# Patient Record
Sex: FEMALE | Race: WHITE | NOT HISPANIC OR LATINO | Employment: FULL TIME | URBAN - METROPOLITAN AREA
[De-identification: names, ages, dates, MRNs, and addresses within clinical notes are randomized per-mention and may not be internally consistent; named-entity substitution may affect disease eponyms.]

---

## 2017-03-30 ENCOUNTER — ALLSCRIPTS OFFICE VISIT (OUTPATIENT)
Dept: OTHER | Facility: OTHER | Age: 37
End: 2017-03-30

## 2017-07-26 ENCOUNTER — ALLSCRIPTS OFFICE VISIT (OUTPATIENT)
Dept: OTHER | Facility: OTHER | Age: 37
End: 2017-07-26

## 2017-07-26 DIAGNOSIS — E01.0 IODINE-DEFICIENCY RELATED DIFFUSE GOITER: ICD-10-CM

## 2017-07-27 ENCOUNTER — ALLSCRIPTS OFFICE VISIT (OUTPATIENT)
Dept: OTHER | Facility: OTHER | Age: 37
End: 2017-07-27

## 2017-07-27 LAB — HCG, QUALITATIVE (HISTORICAL): NEGATIVE

## 2017-07-28 ENCOUNTER — TRANSCRIBE ORDERS (OUTPATIENT)
Dept: LAB | Facility: HOSPITAL | Age: 37
End: 2017-07-28

## 2017-07-28 ENCOUNTER — HOSPITAL ENCOUNTER (OUTPATIENT)
Dept: RADIOLOGY | Facility: HOSPITAL | Age: 37
Discharge: HOME | End: 2017-07-28
Payer: COMMERCIAL

## 2017-07-28 ENCOUNTER — APPOINTMENT (OUTPATIENT)
Dept: LAB | Facility: HOSPITAL | Age: 37
End: 2017-07-28
Payer: COMMERCIAL

## 2017-07-28 DIAGNOSIS — E01.0 IODINE-DEFICIENCY RELATED DIFFUSE GOITER: ICD-10-CM

## 2017-07-28 LAB
ALBUMIN SERPL BCP-MCNC: 4 G/DL (ref 3.5–5)
ALP SERPL-CCNC: 95 U/L (ref 46–116)
ALT SERPL W P-5'-P-CCNC: 53 U/L (ref 12–78)
ANION GAP SERPL CALCULATED.3IONS-SCNC: 9 MMOL/L (ref 4–13)
AST SERPL W P-5'-P-CCNC: 26 U/L (ref 5–45)
BILIRUB SERPL-MCNC: 0.38 MG/DL (ref 0.2–1)
BUN SERPL-MCNC: 12 MG/DL (ref 5–25)
CALCIUM SERPL-MCNC: 8.5 MG/DL (ref 8.3–10.1)
CHLORIDE SERPL-SCNC: 106 MMOL/L (ref 100–108)
CO2 SERPL-SCNC: 24 MMOL/L (ref 21–32)
CREAT SERPL-MCNC: 0.59 MG/DL (ref 0.6–1.3)
GFR SERPL CREATININE-BSD FRML MDRD: 116 ML/MIN/1.73SQ M
GLUCOSE P FAST SERPL-MCNC: 86 MG/DL (ref 65–99)
POTASSIUM SERPL-SCNC: 3.9 MMOL/L (ref 3.5–5.3)
PROT SERPL-MCNC: 7.9 G/DL (ref 6.4–8.2)
SODIUM SERPL-SCNC: 139 MMOL/L (ref 136–145)
T3 SERPL-MCNC: 1.3 NG/ML (ref 0.6–1.8)
T4 FREE SERPL-MCNC: 1.02 NG/DL (ref 0.76–1.46)
TSH SERPL DL<=0.05 MIU/L-ACNC: 1.99 UIU/ML (ref 0.36–3.74)

## 2017-07-28 PROCEDURE — 80053 COMPREHEN METABOLIC PANEL: CPT

## 2017-07-28 PROCEDURE — 36415 COLL VENOUS BLD VENIPUNCTURE: CPT

## 2017-07-28 PROCEDURE — 84443 ASSAY THYROID STIM HORMONE: CPT

## 2017-07-28 PROCEDURE — 76536 US EXAM OF HEAD AND NECK: CPT

## 2017-07-28 PROCEDURE — 84480 ASSAY TRIIODOTHYRONINE (T3): CPT

## 2017-07-28 PROCEDURE — 84439 ASSAY OF FREE THYROXINE: CPT

## 2018-01-13 VITALS
TEMPERATURE: 98.5 F | WEIGHT: 214.73 LBS | HEIGHT: 66 IN | BODY MASS INDEX: 34.51 KG/M2 | DIASTOLIC BLOOD PRESSURE: 78 MMHG | HEART RATE: 70 BPM | SYSTOLIC BLOOD PRESSURE: 120 MMHG

## 2018-01-13 VITALS
HEART RATE: 67 BPM | DIASTOLIC BLOOD PRESSURE: 80 MMHG | SYSTOLIC BLOOD PRESSURE: 121 MMHG | HEIGHT: 67 IN | WEIGHT: 216 LBS | BODY MASS INDEX: 33.9 KG/M2

## 2018-01-14 VITALS
HEIGHT: 65 IN | SYSTOLIC BLOOD PRESSURE: 136 MMHG | WEIGHT: 214 LBS | DIASTOLIC BLOOD PRESSURE: 71 MMHG | BODY MASS INDEX: 35.65 KG/M2

## 2018-08-23 RX ORDER — MULTIVITAMIN/IRON/FOLIC ACID 18MG-0.4MG
TABLET ORAL
COMMUNITY

## 2018-08-23 RX ORDER — CRANBERRY FRUIT EXTRACT 200 MG
CAPSULE ORAL
COMMUNITY

## 2018-08-23 RX ORDER — NICOTINE 21 MG/24HR
PATCH, TRANSDERMAL 24 HOURS TRANSDERMAL DAILY
COMMUNITY
Start: 2016-09-26 | End: 2020-04-24

## 2018-08-27 PROBLEM — E04.9 ENLARGED THYROID: Status: ACTIVE | Noted: 2017-07-26

## 2018-08-27 PROBLEM — F41.9 ANXIOUSNESS: Status: ACTIVE | Noted: 2017-07-26

## 2018-08-28 ENCOUNTER — OFFICE VISIT (OUTPATIENT)
Dept: INTERNAL MEDICINE CLINIC | Facility: CLINIC | Age: 38
End: 2018-08-28
Payer: COMMERCIAL

## 2018-08-28 VITALS
DIASTOLIC BLOOD PRESSURE: 80 MMHG | BODY MASS INDEX: 33.8 KG/M2 | WEIGHT: 210.32 LBS | SYSTOLIC BLOOD PRESSURE: 126 MMHG | HEIGHT: 66 IN | TEMPERATURE: 98.5 F | HEART RATE: 80 BPM

## 2018-08-28 DIAGNOSIS — F41.9 ANXIETY: ICD-10-CM

## 2018-08-28 DIAGNOSIS — E78.5 BORDERLINE HYPERLIPIDEMIA: ICD-10-CM

## 2018-08-28 DIAGNOSIS — M77.11 RIGHT LATERAL EPICONDYLITIS: Primary | ICD-10-CM

## 2018-08-28 PROCEDURE — 3008F BODY MASS INDEX DOCD: CPT | Performed by: PHYSICIAN ASSISTANT

## 2018-08-28 PROCEDURE — 99213 OFFICE O/P EST LOW 20 MIN: CPT | Performed by: PHYSICIAN ASSISTANT

## 2018-08-28 RX ORDER — HYDROXYZINE HYDROCHLORIDE 25 MG/1
25 TABLET, FILM COATED ORAL 2 TIMES DAILY
Qty: 60 TABLET | Refills: 0 | Status: SHIPPED | OUTPATIENT
Start: 2018-08-28 | End: 2020-04-24

## 2018-08-28 RX ORDER — NAPROXEN 500 MG/1
500 TABLET ORAL 2 TIMES DAILY WITH MEALS
Qty: 60 TABLET | Refills: 0 | Status: SHIPPED | OUTPATIENT
Start: 2018-08-28 | End: 2018-08-30

## 2018-08-28 NOTE — PROGRESS NOTES
Assessment/Plan:    No problem-specific Assessment & Plan notes found for this encounter  Diagnoses and all orders for this visit:    Right lateral epicondylitis  -     Ambulatory referral to Physical Therapy; Future  -     naproxen (EC NAPROSYN) 500 MG EC tablet; Take 1 tablet (500 mg total) by mouth 2 (two) times a day with meals for 30 days    Anxiety  -     hydrOXYzine HCL (ATARAX) 25 mg tablet; Take 1 tablet (25 mg total) by mouth 2 (two) times a day for 30 days    Borderline hyperlipidemia  -     Comprehensive metabolic panel  -     Lipid Panel with Direct LDL reflex    Other orders  -     Probiotic Product (ACIDOPHILUS PROBIOTIC BLEND) CAPS; Take by mouth  -     Biotin 5000 MCG CAPS; Take by mouth  -     DAILY MULTIPLE VITAMINS/IRON TABS; Take by mouth  -     nicotine (NICODERM CQ) 14 mg/24hr TD 24 hr patch; Place on the skin Daily  -     nicotine (NICODERM CQ) 7 mg/24hr TD 24 hr patch; Place 1 patch on the skin daily          Subjective:      Patient ID: Gonzales Tierney is a 34 y o  female  Pt here for episodic with c/o R elbow pain for months  Works as cook and repetitive lifting and twisting  Did purchase a compression band and helps but is affecting work and home  Will get a sharp pain when try to lift anything  Patient is R handed  Not dropping anything at this time  Pain does radiate from R elbow into forearm but not to wrist    Pt also reporting increased anxiety is now affecting ADL  States has for years but could usually calm herself down  Admits mother had anxiety as well  Admits affecting sleep, OK to fall asleep but difficult to stay asleep  During the day depending on the day  Work and outside but likes her work  No anxiety at home    Reports started smoking some again due to anxiety and working at [employer] and almost everyone there smokes  Wants to improve anxiety before trying to quit again          The following portions of the patient's history were reviewed and updated as appropriate: allergies, current medications, past family history, past medical history, past social history, past surgical history and problem list     Review of Systems   Constitutional: Negative  HENT: Negative  Respiratory: Negative  Negative for shortness of breath  Cardiovascular: Negative  Negative for chest pain and palpitations  Gastrointestinal: Negative  Genitourinary: Negative  Musculoskeletal: Positive for arthralgias and myalgias  Negative for joint swelling  Skin: Negative  Negative for rash  Neurological: Negative for numbness  Psychiatric/Behavioral: Negative for self-injury and suicidal ideas  The patient is nervous/anxious  Objective:      /80 (BP Location: Right arm, Patient Position: Sitting, Cuff Size: Large)   Pulse 80   Temp 98 5 °F (36 9 °C) (Oral)   Ht 5' 5 5" (1 664 m)   Wt 95 4 kg (210 lb 5 1 oz)   BMI 34 47 kg/m²          Physical Exam   Constitutional: She is oriented to person, place, and time  She appears well-developed and well-nourished  HENT:   Head: Normocephalic  Cardiovascular: Normal rate, regular rhythm and normal heart sounds  Pulmonary/Chest: Breath sounds normal  She has no wheezes  Abdominal: Soft  Musculoskeletal: She exhibits tenderness  She exhibits no deformity  Right elbow: She exhibits normal range of motion and no swelling  Tenderness found  Lateral epicondyle tenderness noted  Pain to lateral R epicondyle with pronation  Jhon Angel   5/5 equal bilat UE but some pain to elbow with   Neurological: She is alert and oriented to person, place, and time  Skin: Skin is warm and dry  No rash noted  Psychiatric: Her behavior is normal  Thought content normal  Her mood appears anxious   Her speech is not tangential  Cognition and memory are normal    Slightly anxious rapid speech but not tangential

## 2018-08-28 NOTE — PATIENT INSTRUCTIONS
Continue the compression band daily at work  [employer] the physical therapy  Naproxen twice daily with food  Try the hydroxyzine for anxiety  Sched with MH  Get your labs completed  Appt here in 1 month to review    Tennis Elbow   WHAT YOU NEED TO KNOW:   What is tennis elbow? Tennis elbow is inflammation of the tendons in your elbow  Tendons are strong tissues that connect muscle to bone  What causes tennis elbow? Tennis elbow is caused by overuse of the muscles in your forearm  These muscles are used to straighten your arm or lift your hand and wrist  Fast, repeated arm movements can lead to inflammation and small tears in your tendon  Tennis, painting, and manual labor are common activities that can cause tennis elbow  What are the signs and symptoms of tennis elbow? · Pain on the side of your elbow that travels to your upper arm, forearm, or fingers    · Weakness in your wrist or hand    · Trouble holding, lifting, or grabbing an object, such as a coffee cup    · Red, swollen, or warm skin on the outside of your elbow  How is tennis elbow diagnosed? Your healthcare provider will feel around your elbow to check for painful areas  He will check the movement of your elbow, wrist, and fingers  An x-ray, ultrasound, or MRI may show tendon damage  You may be given contrast liquid to help the tendons show up better in the pictures  Tell the healthcare provider if you have ever had an allergic reaction to contrast liquid  Do not enter the MRI room with anything metal  Metal can cause serious injury  Tell the healthcare provider if you have any metal in or on your body  How is tennis elbow treated? · Support devices  may be needed to limit your arm movement  Examples include an arm strap, brace, or splint  These devices also help decrease pain and prevent more damage to your tendon  · Acetaminophen  decreases pain and fever  It is available without a doctor's order   Ask how much to take and how often to take it  Follow directions  Read the labels of all other medicines you are using to see if they also contain acetaminophen, or ask your doctor or pharmacist  Acetaminophen can cause liver damage if not taken correctly  Do not use more than 4 grams (4,000 milligrams) total of acetaminophen in one day  · NSAIDs , such as ibuprofen, help decrease swelling, pain, and fever  This medicine is available with or without a doctor's order  NSAIDs can cause stomach bleeding or kidney problems in certain people  If you take blood thinner medicine, always ask your healthcare provider if NSAIDs are safe for you  Always read the medicine label and follow directions  · A steroid injection  will help decrease pain and swelling  · Physical therapy  may be recommended  A physical therapist teaches you exercises to help improve movement and strength, and to decrease pain  · Surgery  may be needed if your symptoms do not improve with other treatments  During surgery, your healthcare provider will remove any damaged tissue  He may also cut your tendon and reattach it  How can I manage my symptoms? · Rest  your injured arm and avoid activities that cause pain  This will help your tendons heal     · Apply ice  on your elbow for 15 to 20 minutes every hour or as directed  Use an ice pack, or put crushed ice in a plastic bag  Cover it with a towel before you apply it to your skin  Ice helps prevent tissue damage and decreases swelling and pain  · Elevate  your elbow above the level of your heart as often as you can  This will help decrease swelling and pain  Prop your elbow on pillows or blankets to keep it elevated comfortably  When should I seek immediate care? · You suddenly have no feeling in your arm, hand, or fingers  · You suddenly cannot move your arm, wrist, hand, or fingers  When should I contact my healthcare provider? · Your symptoms do not get better within 2 weeks, even with treatment      · You have more pain or weakness in your arm, wrist, hand, or fingers  · You have new numbness or tingling in your arm, hand, or fingers  · You have questions or concerns about your condition or care  CARE AGREEMENT:   You have the right to help plan your care  Learn about your health condition and how it may be treated  Discuss treatment options with your caregivers to decide what care you want to receive  You always have the right to refuse treatment  The above information is an  only  It is not intended as medical advice for individual conditions or treatments  Talk to your doctor, nurse or pharmacist before following any medical regimen to see if it is safe and effective for you  © 2017 [facility] Information is for End User's use only and may not be sold, redistributed or otherwise used for commercial purposes  All illustrations and images included in CareNotes® are the copyrighted property of A D A M , Inc  or Forrest Sparks

## 2018-08-30 ENCOUNTER — TELEPHONE (OUTPATIENT)
Dept: INTERNAL MEDICINE CLINIC | Facility: CLINIC | Age: 38
End: 2018-08-30

## 2018-08-30 DIAGNOSIS — M77.11 RIGHT LATERAL EPICONDYLITIS: ICD-10-CM

## 2018-08-30 RX ORDER — NAPROXEN 500 MG/1
500 TABLET ORAL 2 TIMES DAILY WITH MEALS
Qty: 40 TABLET | Refills: 0 | Status: SHIPPED | OUTPATIENT
Start: 2018-08-30 | End: 2020-04-24

## 2018-08-30 NOTE — TELEPHONE ENCOUNTER
Patient called stating she went to  prescription and the pharmacy did not give her the naproxen (EC NAPROSYN) 500 MG EC tablet  I called the pharmacy and verified with them what the problem was and they stated that the Naproxen prescription was never sent over to them  Please send over a new prescription to the pharmacy  Patient would like to be advised when the medication is at the pharmacy

## 2018-09-04 ENCOUNTER — TRANSCRIBE ORDERS (OUTPATIENT)
Dept: ADMINISTRATIVE | Age: 38
End: 2018-09-04

## 2018-09-04 ENCOUNTER — APPOINTMENT (OUTPATIENT)
Dept: LAB | Age: 38
End: 2018-09-04
Payer: COMMERCIAL

## 2018-09-04 LAB
ALBUMIN SERPL BCP-MCNC: 3.9 G/DL (ref 3.5–5)
ALP SERPL-CCNC: 88 U/L (ref 46–116)
ALT SERPL W P-5'-P-CCNC: 32 U/L (ref 12–78)
ANION GAP SERPL CALCULATED.3IONS-SCNC: 7 MMOL/L (ref 4–13)
AST SERPL W P-5'-P-CCNC: 12 U/L (ref 5–45)
BILIRUB SERPL-MCNC: 0.62 MG/DL (ref 0.2–1)
BUN SERPL-MCNC: 9 MG/DL (ref 5–25)
CALCIUM SERPL-MCNC: 8.9 MG/DL (ref 8.3–10.1)
CHLORIDE SERPL-SCNC: 106 MMOL/L (ref 100–108)
CHOLEST SERPL-MCNC: 188 MG/DL (ref 50–200)
CO2 SERPL-SCNC: 26 MMOL/L (ref 21–32)
CREAT SERPL-MCNC: 0.61 MG/DL (ref 0.6–1.3)
GFR SERPL CREATININE-BSD FRML MDRD: 114 ML/MIN/1.73SQ M
GLUCOSE P FAST SERPL-MCNC: 90 MG/DL (ref 65–99)
HDLC SERPL-MCNC: 62 MG/DL (ref 40–60)
LDLC SERPL CALC-MCNC: 115 MG/DL (ref 0–100)
POTASSIUM SERPL-SCNC: 4 MMOL/L (ref 3.5–5.3)
PROT SERPL-MCNC: 7.9 G/DL (ref 6.4–8.2)
SODIUM SERPL-SCNC: 139 MMOL/L (ref 136–145)
TRIGL SERPL-MCNC: 54 MG/DL

## 2018-09-04 PROCEDURE — 36415 COLL VENOUS BLD VENIPUNCTURE: CPT | Performed by: PHYSICIAN ASSISTANT

## 2018-09-04 PROCEDURE — 80053 COMPREHEN METABOLIC PANEL: CPT | Performed by: PHYSICIAN ASSISTANT

## 2018-09-04 PROCEDURE — 80061 LIPID PANEL: CPT | Performed by: PHYSICIAN ASSISTANT

## 2018-09-24 DIAGNOSIS — F41.9 ANXIETY: ICD-10-CM

## 2018-09-25 RX ORDER — HYDROXYZINE HYDROCHLORIDE 25 MG/1
25 TABLET, FILM COATED ORAL 2 TIMES DAILY
Qty: 60 TABLET | Refills: 0 | OUTPATIENT
Start: 2018-09-25 | End: 2018-10-25

## 2019-03-04 ENCOUNTER — TELEPHONE (OUTPATIENT)
Dept: INTERNAL MEDICINE CLINIC | Facility: CLINIC | Age: 39
End: 2019-03-04

## 2020-04-10 ENCOUNTER — TELEPHONE (OUTPATIENT)
Dept: DERMATOLOGY | Facility: CLINIC | Age: 40
End: 2020-04-10

## 2020-04-24 ENCOUNTER — OFFICE VISIT (OUTPATIENT)
Dept: OBGYN CLINIC | Facility: CLINIC | Age: 40
End: 2020-04-24

## 2020-04-24 VITALS
TEMPERATURE: 98.4 F | HEIGHT: 67 IN | DIASTOLIC BLOOD PRESSURE: 87 MMHG | HEART RATE: 79 BPM | WEIGHT: 221 LBS | SYSTOLIC BLOOD PRESSURE: 128 MMHG | BODY MASS INDEX: 34.69 KG/M2

## 2020-04-24 DIAGNOSIS — N76.0 BV (BACTERIAL VAGINOSIS): ICD-10-CM

## 2020-04-24 DIAGNOSIS — N93.9 VAGINAL SPOTTING: ICD-10-CM

## 2020-04-24 DIAGNOSIS — B96.89 BV (BACTERIAL VAGINOSIS): ICD-10-CM

## 2020-04-24 DIAGNOSIS — Z30.431 IUD CHECK UP: Primary | ICD-10-CM

## 2020-04-24 LAB
BV WHIFF TEST VAG QL: POSITIVE
CLUE CELLS SPEC QL WET PREP: ABNORMAL
PH SMN: 5.5 [PH]
SL AMB POCT URINE HCG: NEGATIVE
SL AMB POCT WET MOUNT: POSITIVE
T VAGINALIS VAG QL WET PREP: ABNORMAL
YEAST VAG QL WET PREP: NEGATIVE

## 2020-04-24 PROCEDURE — 81025 URINE PREGNANCY TEST: CPT | Performed by: NURSE PRACTITIONER

## 2020-04-24 PROCEDURE — 3008F BODY MASS INDEX DOCD: CPT | Performed by: NURSE PRACTITIONER

## 2020-04-24 PROCEDURE — 87210 SMEAR WET MOUNT SALINE/INK: CPT | Performed by: NURSE PRACTITIONER

## 2020-04-24 PROCEDURE — 99202 OFFICE O/P NEW SF 15 MIN: CPT | Performed by: NURSE PRACTITIONER

## 2020-04-24 PROCEDURE — 1036F TOBACCO NON-USER: CPT | Performed by: NURSE PRACTITIONER

## 2020-04-24 RX ORDER — CETIRIZINE HYDROCHLORIDE 10 MG/1
10 TABLET ORAL DAILY
COMMUNITY

## 2020-04-24 RX ORDER — METRONIDAZOLE 500 MG/1
500 TABLET ORAL EVERY 12 HOURS SCHEDULED
Qty: 14 TABLET | Refills: 0 | Status: SHIPPED | OUTPATIENT
Start: 2020-04-24 | End: 2020-05-01

## 2020-05-07 ENCOUNTER — TELEPHONE (OUTPATIENT)
Dept: DERMATOLOGY | Facility: CLINIC | Age: 40
End: 2020-05-07

## 2020-05-14 ENCOUNTER — TELEMEDICINE (OUTPATIENT)
Dept: DERMATOLOGY | Facility: CLINIC | Age: 40
End: 2020-05-14
Payer: COMMERCIAL

## 2020-05-14 DIAGNOSIS — L64.9 ANDROGENIC ALOPECIA: ICD-10-CM

## 2020-05-14 DIAGNOSIS — L82.1 SEBORRHEIC KERATOSIS: Primary | ICD-10-CM

## 2020-05-14 PROCEDURE — 99204 OFFICE O/P NEW MOD 45 MIN: CPT | Performed by: DERMATOLOGY

## 2020-06-01 ENCOUNTER — TELEPHONE (OUTPATIENT)
Dept: OBGYN CLINIC | Facility: CLINIC | Age: 40
End: 2020-06-01

## 2020-06-02 ENCOUNTER — ANNUAL EXAM (OUTPATIENT)
Dept: OBGYN CLINIC | Facility: CLINIC | Age: 40
End: 2020-06-02

## 2020-06-02 VITALS
TEMPERATURE: 97.9 F | HEIGHT: 67 IN | SYSTOLIC BLOOD PRESSURE: 124 MMHG | DIASTOLIC BLOOD PRESSURE: 84 MMHG | WEIGHT: 221 LBS | HEART RATE: 73 BPM | BODY MASS INDEX: 34.69 KG/M2

## 2020-06-02 DIAGNOSIS — Z23 NEED FOR HPV VACCINE: ICD-10-CM

## 2020-06-02 DIAGNOSIS — Z01.419 ENCOUNTER FOR ANNUAL ROUTINE GYNECOLOGICAL EXAMINATION: Primary | ICD-10-CM

## 2020-06-02 PROCEDURE — 90651 9VHPV VACCINE 2/3 DOSE IM: CPT

## 2020-06-02 PROCEDURE — 87624 HPV HI-RISK TYP POOLED RSLT: CPT | Performed by: OBSTETRICS & GYNECOLOGY

## 2020-06-02 PROCEDURE — 87591 N.GONORRHOEAE DNA AMP PROB: CPT | Performed by: OBSTETRICS & GYNECOLOGY

## 2020-06-02 PROCEDURE — 87491 CHLMYD TRACH DNA AMP PROBE: CPT | Performed by: OBSTETRICS & GYNECOLOGY

## 2020-06-02 PROCEDURE — 90471 IMMUNIZATION ADMIN: CPT

## 2020-06-02 PROCEDURE — G0145 SCR C/V CYTO,THINLAYER,RESCR: HCPCS | Performed by: OBSTETRICS & GYNECOLOGY

## 2020-06-02 PROCEDURE — 99396 PREV VISIT EST AGE 40-64: CPT | Performed by: OBSTETRICS & GYNECOLOGY

## 2020-06-03 LAB
C TRACH DNA SPEC QL NAA+PROBE: NEGATIVE
N GONORRHOEA DNA SPEC QL NAA+PROBE: NEGATIVE

## 2020-06-06 LAB
HPV HR 12 DNA CVX QL NAA+PROBE: NEGATIVE
HPV16 DNA CVX QL NAA+PROBE: NEGATIVE
HPV18 DNA CVX QL NAA+PROBE: NEGATIVE

## 2020-06-08 ENCOUNTER — HOSPITAL ENCOUNTER (OUTPATIENT)
Dept: RADIOLOGY | Age: 40
Discharge: HOME | End: 2020-06-08
Payer: COMMERCIAL

## 2020-06-08 VITALS — BODY MASS INDEX: 33.9 KG/M2 | WEIGHT: 216 LBS | HEIGHT: 67 IN

## 2020-06-08 DIAGNOSIS — Z12.31 ENCOUNTER FOR MAMMOGRAM TO ESTABLISH BASELINE MAMMOGRAM: ICD-10-CM

## 2020-06-08 DIAGNOSIS — Z01.419 ENCOUNTER FOR ANNUAL ROUTINE GYNECOLOGICAL EXAMINATION: ICD-10-CM

## 2020-06-08 PROCEDURE — 77067 SCR MAMMO BI INCL CAD: CPT

## 2020-06-08 PROCEDURE — 77063 BREAST TOMOSYNTHESIS BI: CPT

## 2020-06-11 LAB
LAB AP GYN PRIMARY INTERPRETATION: NORMAL
Lab: NORMAL

## 2020-07-01 ENCOUNTER — HOSPITAL ENCOUNTER (OUTPATIENT)
Dept: MAMMOGRAPHY | Facility: CLINIC | Age: 40
Discharge: HOME | End: 2020-07-01
Payer: COMMERCIAL

## 2020-07-01 VITALS — HEIGHT: 67 IN | BODY MASS INDEX: 33.9 KG/M2 | WEIGHT: 216 LBS | TEMPERATURE: 97.9 F

## 2020-07-01 DIAGNOSIS — R92.0 MAMMOGRAPHIC MICROCALCIFICATION: ICD-10-CM

## 2020-07-01 PROCEDURE — 77065 DX MAMMO INCL CAD UNI: CPT

## 2020-07-01 NOTE — PROGRESS NOTES
Met with patient and Dr Reba Putnam regarding recommendation for;    _____ RIGHT ___X___LEFT      _____Ultrasound guided  ___X___Stereotactic breast biopsy  __X___Verbalized understanding        Blood thinners:  _____yes __X___no    Date stopped: ___N/A____    Biopsy teaching sheet given:  __X___yes ____no    Pt given contact information and adv to call with any questions/needs

## 2020-07-20 ENCOUNTER — HOSPITAL ENCOUNTER (OUTPATIENT)
Dept: MAMMOGRAPHY | Facility: CLINIC | Age: 40
Discharge: HOME | End: 2020-07-20
Payer: COMMERCIAL

## 2020-07-20 ENCOUNTER — HOSPITAL ENCOUNTER (OUTPATIENT)
Dept: MAMMOGRAPHY | Facility: CLINIC | Age: 40
Discharge: HOME | End: 2020-07-20
Admitting: RADIOLOGY
Payer: COMMERCIAL

## 2020-07-20 VITALS — HEART RATE: 60 BPM | SYSTOLIC BLOOD PRESSURE: 132 MMHG | TEMPERATURE: 96.3 F | DIASTOLIC BLOOD PRESSURE: 80 MMHG

## 2020-07-20 DIAGNOSIS — R92.8 ABNORMAL MAMMOGRAM: ICD-10-CM

## 2020-07-20 PROCEDURE — 88361 TUMOR IMMUNOHISTOCHEM/COMPUT: CPT | Performed by: PATHOLOGY

## 2020-07-20 PROCEDURE — 88342 IMHCHEM/IMCYTCHM 1ST ANTB: CPT | Performed by: PATHOLOGY

## 2020-07-20 PROCEDURE — 88305 TISSUE EXAM BY PATHOLOGIST: CPT | Performed by: PATHOLOGY

## 2020-07-20 PROCEDURE — 88341 IMHCHEM/IMCYTCHM EA ADD ANTB: CPT | Performed by: PATHOLOGY

## 2020-07-20 PROCEDURE — 19081 BX BREAST 1ST LESION STRTCTC: CPT

## 2020-07-20 RX ORDER — LIDOCAINE HYDROCHLORIDE AND EPINEPHRINE 20; 5 MG/ML; UG/ML
10 INJECTION, SOLUTION EPIDURAL; INFILTRATION; INTRACAUDAL; PERINEURAL ONCE
Status: COMPLETED | OUTPATIENT
Start: 2020-07-20 | End: 2020-07-20

## 2020-07-20 RX ORDER — LIDOCAINE HYDROCHLORIDE 10 MG/ML
5 INJECTION, SOLUTION EPIDURAL; INFILTRATION; INTRACAUDAL; PERINEURAL ONCE
Status: COMPLETED | OUTPATIENT
Start: 2020-07-20 | End: 2020-07-20

## 2020-07-20 RX ADMIN — LIDOCAINE HYDROCHLORIDE,EPINEPHRINE BITARTRATE 10 ML: 20; .005 INJECTION, SOLUTION EPIDURAL; INFILTRATION; INTRACAUDAL; PERINEURAL at 09:48

## 2020-07-20 RX ADMIN — LIDOCAINE HYDROCHLORIDE 5 ML: 10 INJECTION, SOLUTION EPIDURAL; INFILTRATION; INTRACAUDAL; PERINEURAL at 09:48

## 2020-07-20 RX ADMIN — LIDOCAINE HYDROCHLORIDE,EPINEPHRINE BITARTRATE 10 ML: 20; .005 INJECTION, SOLUTION EPIDURAL; INFILTRATION; INTRACAUDAL; PERINEURAL at 10:22

## 2020-07-20 NOTE — PROGRESS NOTES
Ice pack given:    ___X__yes _____no    Discharge instructions signed by patient:    __X___yes _____no    Discharge instructions given to patient:    ____X_yes _____no    Discharged via:    __X___amulatory    _____wheelchair    _____stretcher    Stable on discharge:    ___X__yes ____no

## 2020-07-20 NOTE — PROGRESS NOTES
Procedure type:    _____ultrasound guided __X___stereotactic    Breast:    ___X__Left _____Right    Location: 11 o'clock    Needle: 8ga Revolve    # of passes: 2 cores with calcs and 1 core without calcs    Clip: Triple twist    Performed by: Dr Nithin Mcclellan held for 5 minutes by:  Sandra VilchisPCA)    Steri Strips:    __X___yes _____no    Band aid:    __X___yes_____no    Tape and guaze:    ___X__yes _____no    Tolerated procedure:    __X___yes _____no

## 2020-07-20 NOTE — DISCHARGE INSTR - OTHER ORDERS
POST LARGE CORE BREAST BIOPSY PATIENT INFORMATION      1  Place an ice pack inside your bra over the top of the dressing every hour for 20 minutes (20 minutes on, 60 minutes off)  Do this until bedtime  2  Do not shower or bathe until the following morning  3  You may bathe your breast carefully with the steri-strips in place  Be careful    Not to loosen them  The steri-strips will fall off in 3-5 days  4  You may have mild discomfort, and you may have some bruising where the   Needle entered the skin  This should clear within 5-7 days  5  If you need medicine for discomfort, take acetaminophen products such as   Tylenol  You may also take Advil or Motrin products  6  Do not participate in strenuous activities such as-tennis, aerobics, skiing,  Weight lifting, etc  for 24 hours  Refrain from swimming/soaking for 72 hours  7  Wearing a bra for sleeping may be more comfortable for the first 24-48 hours  8  Watch for continued bleeding, pain or fever over 101; please call with any questions or concerns  For procedures done at the [facility]  [facility] [address] call:  Jame Barriga RN at [phone]  Suly Huynh RN at [phone]                    *After 4 PM call the Interventional Radiology Department                    [phone] and ask to speak with the nurse on call  For procedures done at the [facility] call:         Stevie Bray RN at   *After 4 PM call the Interventional Radiology Department   [phone] and ask to speak with the nurse on call  For procedures done at [address] [place] [address] call: The Radiology Nurse at [phone]  *After 4 PM call your physician, or go to the Emergency Department  9          The final results of your biopsy are usually available within one week

## 2020-07-21 NOTE — PROGRESS NOTES
Post procedure call completed 7/21/20 2016    Bleeding: _____yes __X___no    Pain: _____yes ___X___no (just a bit sore)    Redness/Swelling: ______yes ___X___no     Band aid removed: __X___yes _____no    Steri-Strips intact: ___X___yes _____no    Pt with no questions at this time, adv will call when results available, adv to call with any questions or concerns, has name/# for contact

## 2020-07-22 ENCOUNTER — TELEPHONE (OUTPATIENT)
Dept: MAMMOGRAPHY | Facility: CLINIC | Age: 40
End: 2020-07-22

## 2020-07-22 ENCOUNTER — TELEPHONE (OUTPATIENT)
Dept: SURGICAL ONCOLOGY | Facility: CLINIC | Age: 40
End: 2020-07-22

## 2020-07-22 PROBLEM — D05.12 DUCTAL CARCINOMA IN SITU (DCIS) OF LEFT BREAST: Status: ACTIVE | Noted: 2020-07-22

## 2020-07-22 NOTE — TELEPHONE ENCOUNTER
Epic email sent to Dr Tam Shell just wanted to inform you that the above patient was given her positive breast biopsy results today by Dr Coffman Shells  The patient was referred and set up with surgeon Dr Carolyn Foster and has an appt on Friday July 24th at 230pm       If you have any questions or if I can be of any further assistance please let me know      Thank you,  Julia Orourke, RN  Breast Nurse Navigator

## 2020-07-22 NOTE — TELEPHONE ENCOUNTER
Call placed to patient after pt given biopsy results from radiologist, questions answered, adv next step is to set patient up with surgeon, options discussed and patient wanted to have appt made with Dr Marcy Hurley, determined pt availability and adv pt I would make appt for her and call her back

## 2020-07-22 NOTE — TELEPHONE ENCOUNTER
Call made to patient, adv appt made with Dr Karli Arcos's office on Friday 24th at 0, arrive by 2pm, directions given to office, adv new patient paperwork would be provided upon arrival to Dr Damien Galvan appt, pt with no questions at this time, pt has name/# for contact, adv to contact me anytime for questions/concerns/help

## 2020-07-22 NOTE — TELEPHONE ENCOUNTER
New Patient Encounter    New Patient Intake Form   Patient Details:  Gail Pozo  1980  [id number]    Background Information:  [address] starts by opening a telephone encounter and gathering the following information   Who is calling to schedule? If not self, relationship to patient? provider   Referring Provider [facility]   What is the diagnosis? DCIS   Is this diagnosis confirmed? Yes   When was the diagnosis? 7/22   Is there a confirmed diagnosis from a biopsy/tissue reviewed by pathology? yes   Is patient aware of diagnosis? Yes   Is there a personal history and what kind? no   Is there a family history and what kind? no   Reason for visit? New Diagnosis   Have you had any imaging or labs done? If so: when, where? yes  [facility]   Are records in PLAXD? yes   Was the patient told to bring a disk? no   Does the patient smoke or Vape? no   If yes, how many packs or cartridges per day? na   Scheduling Information:   Preferred [place]: Mellisa Godinez     Are there any dates/time the patient cannot be seen? na   Miscellaneous: na   After completing the above information, please route to Financial Counselor and the appropriate Nurse Navigator for review

## 2020-07-23 NOTE — TELEPHONE ENCOUNTER
Pt has active gateway thru medical assistance recipient id# [id number] cked back to 02/01/20 & its active  there is no need for me to reach out to the pt at this time

## 2020-07-24 ENCOUNTER — TRANSCRIBE ORDERS (OUTPATIENT)
Dept: LAB | Facility: CLINIC | Age: 40
End: 2020-07-24

## 2020-07-24 ENCOUNTER — APPOINTMENT (OUTPATIENT)
Dept: LAB | Facility: CLINIC | Age: 40
End: 2020-07-24
Payer: COMMERCIAL

## 2020-07-24 ENCOUNTER — CONSULT (OUTPATIENT)
Dept: SURGICAL ONCOLOGY | Facility: CLINIC | Age: 40
End: 2020-07-24
Payer: COMMERCIAL

## 2020-07-24 VITALS
BODY MASS INDEX: 34.53 KG/M2 | SYSTOLIC BLOOD PRESSURE: 152 MMHG | HEART RATE: 82 BPM | TEMPERATURE: 98 F | HEIGHT: 67 IN | DIASTOLIC BLOOD PRESSURE: 94 MMHG | WEIGHT: 220 LBS | RESPIRATION RATE: 16 BRPM

## 2020-07-24 DIAGNOSIS — D05.12 INTRADUCTAL CARCINOMA IN SITU OF LEFT BREAST: ICD-10-CM

## 2020-07-24 DIAGNOSIS — D05.12 DUCTAL CARCINOMA IN SITU (DCIS) OF LEFT BREAST: Primary | ICD-10-CM

## 2020-07-24 DIAGNOSIS — D05.12 INTRADUCTAL CARCINOMA IN SITU OF LEFT BREAST: Primary | ICD-10-CM

## 2020-07-24 PROCEDURE — 36415 COLL VENOUS BLD VENIPUNCTURE: CPT

## 2020-07-24 PROCEDURE — 99245 OFF/OP CONSLTJ NEW/EST HI 55: CPT | Performed by: SURGERY

## 2020-07-24 NOTE — PROGRESS NOTES
Surgical Oncology Consult Note       [address] [facility]  [facility] SURGICAL ONCOLOGY [place]  [address] [facility] [address]  [facility] [address]    Jessica Villalobos  1980  [id number]  [address] [facility]  [facility] SURGICAL ONCOLOGY [place]  [address] [facility] [address]      Chief Complaint:     Chief Complaint   Patient presents with    Consult    Breast Cancer     New Diagnosis       Assessment and Plan:   Assessment/Plan   Patient presents with a new diagnosis of left breast ductal carcinoma in situ, grade 2, ER and CT both 100% a this measured approximately 5 mm in size is at the 11 o'clock position anterior depth  The patient has minimal family history though she is under the age of 46 of recommended she have genetic testing  We will coordinate that for her  Oncology History:        Ductal carcinoma in situ (DCIS) of left breast    7/20/2020 Biopsy     Left breast stereotactic biopsy  11 o'clock, anterior depth  Ductal carcinoma in situ  Grade 2        Concordant  Malignancy appears unifocal  Calcifications spanned 5 mm  Right breast clear  History of Present Illness: This is a 76-year-old woman who went for her 1st mammogram which demonstrated a 5 mm cluster of calcifications in the left breast   The right breast was cleared  She subsequent had a diagnostic mammogram followed by a stereotactic biopsy which demonstrated ductal carcinoma in situ grade 2 % %  Family history is remarkable for prostate cancer in a paternal grandfather  Patient presents now for an opinion regarding further management  Review of Systems:   Review of Systems   Constitutional: Negative for activity change, appetite change and fatigue  HENT: Negative  Eyes: Negative  Respiratory: Negative for cough, shortness of breath and wheezing  Cardiovascular: Negative for chest pain and leg swelling  Gastrointestinal: Negative  Endocrine: Negative  Genitourinary: Negative  Musculoskeletal:        No new changes or complaints of bone pain   Skin: Negative  Allergic/Immunologic: Negative  Neurological: Negative  Hematological: Negative  Psychiatric/Behavioral: Negative          Past Medical History:      Patient Active Problem List   Diagnosis    Anxiousness    Enlarged thyroid    Fatigue    Right lateral epicondylitis    Anxiety    Borderline hyperlipidemia    Ductal carcinoma in situ (DCIS) of left breast        Past Medical History:   Diagnosis Date    Abnormal Pap smear of cervix     Anxiety 01/2015    Last assessed    HPV (human papilloma virus) infection     unsure        Past Surgical History:   Procedure Laterality Date    CHOLECYSTECTOMY LAPAROSCOPIC  01/2015    Last assessed    COLPOSCOPY      MAMMO STEREOTACTIC BREAST BIOPSY LEFT (ALL INC) Left 7/20/2020    MOUTH SURGERY          Family History   Problem Relation Age of Onset    Heart attack Father         Acute    Heart failure Father     Coronary artery disease Father     Diabetes Father         DM    Hypertension Father     No Known Problems Maternal Grandmother     Alzheimer's disease Maternal Grandfather     Stroke Paternal Grandmother     Prostate cancer Paternal Grandfather 76    No Known Problems Mother     No Known Problems Son     No Known Problems Sister     No Known Problems Half-Sister     No Known Problems Half-Sister     No Known Problems Brother     No Known Problems Maternal Uncle     No Known Problems Maternal Uncle     No Known Problems Maternal Aunt     No Known Problems Maternal Aunt     No Known Problems Maternal Aunt     No Known Problems Paternal Uncle     No Known Problems Paternal Uncle         Social History     Socioeconomic History    Marital status: Single     Spouse name: Not on file    Number of children: Not on file    Years of education: Not on file    Highest education level: Not on file Occupational History    Not on file   Social Needs    Financial resource strain: Not on file    Food insecurity:     Worry: Not on file     Inability: Not on file    Transportation needs:     Medical: Not on file     Non-medical: Not on file   Tobacco Use    Smoking status: Former Smoker     Packs/day: 1 00     Types: Cigarettes     Last attempt to quit: 2019     Years since quittin 2    Smokeless tobacco: Never Used   Substance and Sexual Activity    Alcohol use:  Yes     Alcohol/week: 2 0 standard drinks     Types: 2 Standard drinks or equivalent per week     Frequency: Monthly or less     Drinks per session: 1 or 2     Binge frequency: Never    Drug use: Not Currently     Types: Marijuana    Sexual activity: Not Currently     Partners: Male     Birth control/protection: IUD   Lifestyle    Physical activity:     Days per week: Not on file     Minutes per session: Not on file    Stress: Not on file   Relationships    Social connections:     Talks on phone: Not on file     Gets together: Not on file     Attends [employer] service: Not on file     Active member of club or organization: Not on file     Attends meetings of clubs or organizations: Not on file     Relationship status: Not on file    Intimate partner violence:     Fear of current or ex partner: Not on file     Emotionally abused: Not on file     Physically abused: Not on file     Forced sexual activity: Not on file   Other Topics Concern    Not on file   Social History Narrative    Caffeine use, Does not exercise, Full-time work, One child, Single,         Current Outpatient Medications:     Biotin 5000 MCG CAPS, Take by mouth, Disp: , Rfl:     cetirizine (ZyrTEC) 10 mg tablet, Take 10 mg by mouth daily, Disp: , Rfl:     DAILY MULTIPLE VITAMINS/IRON TABS, Take by mouth, Disp: , Rfl:     levonorgestrel (MIRENA) 20 MCG/24HR IUD, 1 each by Intrauterine route once, Disp: , Rfl:     Probiotic Product (ACIDOPHILUS PROBIOTIC BLEND) CAPS, Take by mouth, Disp: , Rfl:    No Known Allergies    Physical Exam:     Vitals:    07/24/20 1419   BP: 152/94   Pulse: 82   Resp: 16   Temp: 98 °F (36 7 °C)     Physical Exam   Constitutional: She is oriented to person, place, and time  She appears well-developed and well-nourished  HENT:   Head: Normocephalic and atraumatic  Mouth/Throat: Oropharynx is clear and moist    Eyes: Pupils are equal, round, and reactive to light  EOM are normal    Neck: Normal range of motion  Neck supple  No JVD present  No tracheal deviation present  No thyromegaly present  Cardiovascular: Normal rate, regular rhythm, normal heart sounds and intact distal pulses  Exam reveals no gallop and no friction rub  No murmur heard  Pulmonary/Chest: Effort normal and breath sounds normal  No respiratory distress  She has no wheezes  She has no rales  Both breasts were examined in the sitting and supine position  There are no worrisome skin lesions, no nipple retraction and no nipple discharge  There are no dominant masses, axillary adenopathy or supraclavicular adenopathy on either side  Abdominal: Soft  She exhibits no distension and no mass  There is no hepatomegaly  There is no tenderness  There is no rebound and no guarding  Musculoskeletal: Normal range of motion  She exhibits no edema or tenderness  Lymphadenopathy:     She has no cervical adenopathy  Neurological: She is alert and oriented to person, place, and time  No cranial nerve deficit  Skin: Skin is warm and dry  No rash noted  No erythema  Psychiatric: She has a normal mood and affect  Her behavior is normal    Vitals reviewed  Results:   I reviewed her mammogram both  pre and post biopsy  I concur with results  There concordant with her pathology  Discussion/Summary:   The patient has a diagnosis of ductal carcinoma in situ  She is under the age of 46 I have recommended genetic testing she is agreeable to this    Presuming she does not have a genetic predisposition to malignancy I have recommended a needle localization lumpectomy for this single focus of DCIS  I did explain however that is possible that a small component of invasive cancer could be identified and her final pathology and we would then return to the operating room to remove a lymph node  I explained that adjuvant therapies for DCIS May include radiation therapy as well as anti hormonal therapy  Once we had a definitive diagnosis of DCIS we would review the pros and cons of each of these therapies  I did explain about the prelude test which would be potentially helpful in determining the merits of radiation therapy  All questions were answered the patient's satisfaction  We will see her back after her genetic testing  Advance Care Planning/Advance Directives:  I discussed the disease status, treatment plans and follow-up with the patient

## 2020-07-27 LAB — MISCELLANEOUS LAB TEST RESULT: NORMAL

## 2020-07-30 ENCOUNTER — OFFICE VISIT (OUTPATIENT)
Dept: INTERNAL MEDICINE CLINIC | Facility: CLINIC | Age: 40
End: 2020-07-30

## 2020-07-30 VITALS
DIASTOLIC BLOOD PRESSURE: 80 MMHG | TEMPERATURE: 97.9 F | HEART RATE: 78 BPM | WEIGHT: 209.88 LBS | HEIGHT: 67 IN | SYSTOLIC BLOOD PRESSURE: 136 MMHG | OXYGEN SATURATION: 98 % | BODY MASS INDEX: 32.94 KG/M2

## 2020-07-30 DIAGNOSIS — R03.0 ELEVATED BLOOD PRESSURE READING: ICD-10-CM

## 2020-07-30 DIAGNOSIS — F41.9 ANXIETY: ICD-10-CM

## 2020-07-30 DIAGNOSIS — E66.09 CLASS 1 OBESITY DUE TO EXCESS CALORIES WITHOUT SERIOUS COMORBIDITY WITH BODY MASS INDEX (BMI) OF 33.0 TO 33.9 IN ADULT: ICD-10-CM

## 2020-07-30 DIAGNOSIS — E78.00 ELEVATED LDL CHOLESTEROL LEVEL: Primary | ICD-10-CM

## 2020-07-30 DIAGNOSIS — D05.12 DUCTAL CARCINOMA IN SITU (DCIS) OF LEFT BREAST: ICD-10-CM

## 2020-07-30 PROBLEM — M77.11 RIGHT LATERAL EPICONDYLITIS: Status: RESOLVED | Noted: 2018-08-28 | Resolved: 2020-07-30

## 2020-07-30 PROBLEM — E66.811 CLASS 1 OBESITY DUE TO EXCESS CALORIES WITHOUT SERIOUS COMORBIDITY WITH BODY MASS INDEX (BMI) OF 33.0 TO 33.9 IN ADULT: Status: ACTIVE | Noted: 2020-07-30

## 2020-07-30 PROBLEM — E78.5 BORDERLINE HYPERLIPIDEMIA: Status: RESOLVED | Noted: 2018-08-28 | Resolved: 2020-07-30

## 2020-07-30 PROBLEM — E04.9 ENLARGED THYROID: Status: RESOLVED | Noted: 2017-07-26 | Resolved: 2020-07-30

## 2020-07-30 PROCEDURE — 1036F TOBACCO NON-USER: CPT | Performed by: PHYSICIAN ASSISTANT

## 2020-07-30 PROCEDURE — 99213 OFFICE O/P EST LOW 20 MIN: CPT | Performed by: PHYSICIAN ASSISTANT

## 2020-07-30 PROCEDURE — 3008F BODY MASS INDEX DOCD: CPT | Performed by: PHYSICIAN ASSISTANT

## 2020-07-30 NOTE — PROGRESS NOTES
Assessment/Plan: We reviewed that you did have 1 elevated blood pressure reading but the others have been normal   Script for a blood pressure cuff and instructions provided on how to check your blood pressure  Please return the log in 3-4 weeks  If however you find that your readings are greater than 140 every time to call our office earlier  Script provided for labs we will call you with the results  Continue follow-up with   Your breast surgeon  You are aware that once they receive your genetic testing results you will be contacted regarding surgery  Congratulations on making all of the new diet and lifestyle changes  These will help you in the long run and possibly already helped reduce your blood pressure and you have positive weight loss  As we reviewed additional information provided today regarding taking blood pressure, heart healthy diet and recommendations for healthy meaningful weight loss  Also reviewed that you do have some anxiety are ready and not unexpectedly your diagnosis of ductal carcinoma been site to has increased but at this time you feel you are managing well  If at any time you feel the anxiety or stress is too much to please call our office  No problem-specific Assessment & Plan notes found for this encounter  Diagnoses and all orders for this visit:    Elevated LDL cholesterol level  -     Comprehensive metabolic panel  -     Lipid Panel with Direct LDL reflex    Elevated blood pressure reading  -     Blood Pressure Monitoring (BLOOD PRESSURE MONITOR/M CUFF) MISC; by Does not apply route daily    Ductal carcinoma in situ (DCIS) of left breast    Class 1 obesity due to excess calories without serious comorbidity with body mass index (BMI) of 33 0 to 33 9 in adult    Anxiety          Subjective:      Patient ID: Shola Uribe is a 41 y o  female      LV 2018    Patient is following with surgeon for new diagnosis of ductal carcinoma in situ, had the genetic testing awaiting results  Was told only in the duct  Patient did follow-up with her OBGYN and when sent for her screening mammogram is when abnormalities were noted  Patient obviously even more anxious than her usual secondary to diagnosis but also hopeful as the surgeon told her it was caught early and contained within the ducts  Patient is aware that once the genetic testing results are available the surgical oncologist Dr Lakeisha Bills  Will be contacting her to schedule surgery  Has been worried about her BP was elevated once at the surgeons    Positive FH HTN,  Reports father and brother both have high blood pressure  Has been working to try to change diet stopped soda , no caffeine today  Decreased carbs  Decreased salt was using adobo and a lot of salt in cooking      Patient works as the shaft in a private club  Did review with patient that at her OBGYN visit as well as today her blood pressure is in the normal range  Advised that we will get her a script for a blood pressure cuff to monitor at home and if she has elevated readings will discuss medication  Otherwise congratulated patient on all of the good changes she has made to diet and exercise  Patient does admit she is noticed positive weight loss since making these changes  Reviewed with patient that at her last labs a few years ago she did have slightly elevated LDL cholesterol and therefore new labs are being ordered today  The following portions of the patient's history were reviewed and updated as appropriate: allergies, current medications, past family history, past medical history, past social history, past surgical history and problem list     Review of Systems   Constitutional: Negative  Negative for appetite change, chills, fever and unexpected weight change  HENT: Negative  Eyes: Negative  Respiratory: Negative  Negative for cough and shortness of breath      Cardiovascular: Positive for palpitations  Gastrointestinal: Negative  Musculoskeletal: Negative  Skin:        Hair thinning   Neurological: Positive for headaches  Psychiatric/Behavioral: The patient is nervous/anxious  Objective:      /80 (BP Location: Right arm, Patient Position: Sitting, Cuff Size: Large)   Pulse 78   Temp 97 9 °F (36 6 °C) (Temporal)   Ht 5' 6 5" (1 689 m)   Wt 95 2 kg (209 lb 14 1 oz)   SpO2 98%   BMI 33 37 kg/m²          Physical Exam   Constitutional: She appears well-nourished  No distress  HENT:   Head: Normocephalic  Eyes: Conjunctivae are normal    Neck: Neck supple  No thyromegaly present  Cardiovascular: Normal rate, regular rhythm and normal heart sounds  Pulmonary/Chest: Effort normal and breath sounds normal  She has no wheezes  Abdominal: Soft  Bowel sounds are normal  There is no tenderness  Musculoskeletal: She exhibits no edema  Neurological: She is alert  Skin: Skin is warm and dry  No rash noted  Psychiatric: Her behavior is normal  Judgment and thought content normal  Her mood appears anxious  Nursing note and vitals reviewed  BMI Counseling: Body mass index is 33 37 kg/m²  The BMI is above normal  Nutrition recommendations include reducing portion sizes, decreasing overall calorie intake, 3-5 servings of fruits/vegetables daily, consuming healthier snacks, decreasing soda and/or juice intake, moderation in carbohydrate intake, increasing intake of lean protein, reducing intake of saturated fat and trans fat and reducing intake of cholesterol  Exercise recommendations include exercising 3-5 times per week

## 2020-07-30 NOTE — PATIENT INSTRUCTIONS
We reviewed that you did have 1 elevated blood pressure reading but the others have been normal   Script for a blood pressure cuff and instructions provided on how to check your blood pressure  Please return the log in 3-4 weeks  If however you find that your readings are greater than 140 every time to call our office earlier  Script provided for labs we will call you with the results  Continue follow-up with   Your breast surgeon  You are aware that once they receive your genetic testing results you will be contacted regarding surgery  Congratulations on making all of the new diet and lifestyle changes  These will help you in the long run and possibly already helped reduce your blood pressure and you have positive weight loss  As we reviewed additional information provided today regarding taking blood pressure, heart healthy diet and recommendations for healthy meaningful weight loss  Also reviewed that you do have some anxiety are ready and not unexpectedly 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 Blood pressure (BP) is the force or pressure that blood puts on the walls of your arteries as it goes through your body  BP readings are usually written as 2 numbers  The first or top number is called systolic BP  The second or bottom number is called diastolic BP  Why do I need to take my BP? You may need to take your BP at home if have hypertension (high BP) or hypotension (low BP)  High BP increases your risk for stroke, heart attack, or kidney disease  Low BP may decrease blood flow to your organs, such as your brain and kidneys  This can damage your organs  You may need to take medicine to keep your BP at a normal level   Your healthcare provider can use the BP readings you take at home to make sure that your BP medicines are working  Ask your healthcare provider what your BP should be  How do I take my BP? You can take your BP at home with a digital BP monitor  Read the instructions that came with your BP monitor  The monitor comes with an adjustable cuff  Ask your healthcare provider if your cuff is the correct size  A cuff that is too small will cause a falsely high blood pressure  A cuff that is too big will cause a falsely low blood pressure  · Do not take a BP reading in an arm that is injured or has an IV or shunt  · Do not check your blood pressure within 30 minutes of smoking, drinking coffee, or exercising  These may affect your BP reading  · Rest quietly for 5 minutes before you take your BP  · Sit with your feet flat on the floor and your back against a chair  · Extend your arm and support it on a flat surface  Your arm should be at the same level as your heart  · Make sure all of the air is out of the cuff  Put the cuff about 1 inch (2 5 cm) above your elbow  Wrap the cuff snugly around your arm  The BP reading may not be correct if the cuff is too loose  · If you are using a wrist cuff, wrap the cuff snugly around your wrist  Hold your wrist at the same level as your heart  · Turn on the BP monitor and follow the directions  · Write down your BP, the date, the time, and which arm you used to take the BP  Take your blood pressure twice and write down both readings  These BP readings can be 1 minute apart  How often should I take my BP? Your healthcare provider may recommend that you take your BP at least twice a day  Take your BP at the same times each day, such as the morning and evening  Ask your healthcare provider when and how often you should take your BP  What else do I need to know? · Your healthcare provider will tell you what your BP should be   A BP of 120/80 may not be your goal      · Take your BP medicines as directed  Do not stop taking your medicines if your blood pressure is at your goal   A blood pressure at your goal means your medicine is working correctly  · Keep a log of your BP readings and bring it to your follow-up visits  · Bring your BP monitor to your follow-up visit  Your healthcare provider can check that you are using the monitor correctly  When should I contact my healthcare provider? · Your BP is higher or lower than your healthcare provider has told you it should be  · You have questions or concerns about your condition or care  CARE AGREEMENT:   You have the right to help plan your care  Learn about your health condition and how it may be treated  Discuss treatment options with your caregivers to decide what care you want to receive  You always have the right to refuse treatment  The above information is an  only  It is not intended as medical advice for individual conditions or treatments  Talk to your doctor, nurse or pharmacist before following any medical regimen to see if it is safe and effective for you  © 2017 [address] Information is for End User's use only and may not be sold, redistributed or otherwise used for commercial purposes  All illustrations and images included in CareNotes® are the copyrighted property of [employer]  or [employer]  Weight Management   AMBULATORY CARE:   Why it is important to manage your weight:  Being overweight increases your risk of health conditions such as heart disease, high blood pressure, type 2 diabetes, and certain types of cancer  It can also increase your risk for osteoarthritis, sleep apnea, and other respiratory problems  Aim for a slow, steady weight loss  Even a small amount of weight loss can lower your risk of health problems  How to lose weight safely:  A safe and healthy way to lose weight is to eat fewer calories and get regular exercise   You can lose up about 1 pound a week by decreasing the number of calories you eat by 500 calories each day  You can decrease calories by eating smaller portion sizes or by cutting out high-calorie foods  Read labels to find out how many calories are in the foods you eat  You can also burn calories with exercise such as walking, swimming, or biking  You will be more likely to keep weight off if you make these changes part of your lifestyle  Healthy meal plan for weight management:  A healthy meal plan includes a variety of foods, contains fewer calories, and helps you stay healthy  A healthy meal plan includes the following:  · Eat whole-grain foods more often  A healthy meal plan should contain fiber  Fiber is the part of grains, fruits, and vegetables that is not broken down by your body  Whole-grain foods are healthy and provide extra fiber in your diet  Some examples of whole-grain foods are whole-wheat breads and pastas, oatmeal, brown rice, and bulgur  · Eat a variety of vegetables every day  Include dark, leafy greens such as spinach, kale, kristy greens, and mustard greens  Eat yellow and orange vegetables such as carrots, sweet potatoes, and winter squash  · Eat a variety of fruits every day  Choose fresh or canned fruit (canned in its own juice or light syrup) instead of juice  Fruit juice has very little or no fiber  · Eat low-fat dairy foods  Drink fat-free (skim) milk or 1% milk  Eat fat-free yogurt and low-fat cottage cheese  Try low-fat cheeses such as mozzarella and other reduced-fat cheeses  · Choose meat and other protein foods that are low in fat  Choose beans or other legumes such as split peas or lentils  Choose fish, skinless poultry (chicken or turkey), or lean cuts of red meat (beef or pork)  Before you cook meat or poultry, cut off any visible fat  · Use less fat and oil  Try baking foods instead of frying them   Add less fat, such as margarine, sour cream, regular salad dressing and mayonnaise to foods  Eat fewer high-fat foods  Some examples of high-fat foods include french fries, doughnuts, ice cream, and cakes  · Eat fewer sweets  Limit foods and drinks that are high in sugar  This includes candy, cookies, regular soda, and sweetened drinks  Ways to decrease calories:   · Eat smaller portions  ¨ Use a small plate with smaller servings  ¨ Do not eat second helpings  ¨ When you eat at a restaurant, ask for a box and place half of your meal in the box before you eat  ¨ Share an entrée with someone else  · Replace high-calorie snacks with healthy, low-calorie snacks  ¨ Choose fresh fruit, vegetables, fat-free rice cakes, or air-popped popcorn instead of potato chips, nuts, or chocolate  ¨ Choose water or calorie-free drinks instead of soda or sweetened drinks  · Eat regular meals  Skipping meals can lead to overeating later in the day  Eat a healthy snack in place of a meal if you do not have time to eat a regular meal      · Do not shop for groceries when you are hungry  You may be more likely to make unhealthy food choices  Take a grocery list of healthy foods and shop after you have eaten  Exercise:  Exercise at least 30 minutes per day on most days of the week  Some examples of exercise include walking, biking, dancing, and swimming  You can also fit in more physical activity by taking the stairs instead of the elevator or parking farther away from stores  Ask your healthcare provider about the best exercise plan for you  Other things to consider as you try to lose weight:   · Be aware of situations that may give you the urge to overeat, such as eating while watching television  Find ways to avoid these situations  For example, read a book, go for a walk, or do crafts  · Meet with a weight loss support group or friends who are also trying to lose weight  This may help you stay motivated to continue working on your weight loss goals    © 2017 [facility] is for End User's use only and may not be sold, redistributed or otherwise used for commercial purposes  All illustrations and images included in CareNotes® are the copyrighted property of [employer]  or [employer]  The above information is an  only  It is not intended as medical advice for individual conditions or treatments  Talk to your doctor, nurse or pharmacist before following any medical regimen to see if it is safe and effective for you  Low Fat Diet   AMBULATORY CARE:   A low-fat diet  is an eating plan that is low in total fat, unhealthy fat, and cholesterol  You may need to follow a low-fat diet if you have trouble digesting or absorbing fat  You may also need to follow this diet if you have high cholesterol  You can also lower your cholesterol by increasing the amount of fiber in your diet  Soluble fiber is a type of fiber that helps to decrease cholesterol levels  Different types of fat in food:   · Limit unhealthy fats  A diet that is high in cholesterol, saturated fat, and trans fat may cause unhealthy cholesterol levels  Unhealthy cholesterol levels increase your risk of heart disease  ¨ Cholesterol:  Limit intake of cholesterol to less than 200 mg per day  Cholesterol is found in meat, eggs, and dairy  ¨ Saturated fat:  Limit saturated fat to less than 7% of your total daily calories  Ask your dietitian how many calories you need each day  Saturated fat is found in butter, cheese, ice cream, whole milk, and palm oil  Saturated fat is also found in meat, such as beef, pork, chicken skin, and processed meats  Processed meats include sausage, hot dogs, and bologna  ¨ Trans fat:  Avoid trans fat as much as possible  Trans fat is used in fried and baked foods  Foods that say trans fat free on the label may still have up to 0 5 grams of trans fat per serving  · Include healthy fats    Replace foods that are high in saturated and trans fat with foods high in healthy fats  This may help to decrease high cholesterol levels  ¨ Monounsaturated fats: These are found in avocados, nuts, and vegetable oils, such as olive, canola, and sunflower oil  ¨ Polyunsaturated fats: These can be found in vegetable oils, such as soybean or corn oil  Omega-3 fats can help to decrease the risk of heart disease  Omega-3 fats are found in fish, such as salmon, herring, trout, and tuna  Omega-3 fats can also be found in plant foods, such as walnuts, flaxseed, soybeans, and canola oil    Foods to limit or avoid:   · Grains:      ¨ Snacks that are made with partially hydrogenated oils, such as chips, regular crackers, and butter-flavored popcorn    ¨ High-fat baked goods, such as biscuits, croissants, doughnuts, pies, cookies, and pastries    · Dairy:      ¨ Whole milk, 2% milk, and yogurt and ice cream made with whole milk    ¨ Half and half creamer, heavy cream, and whipping cream    ¨ Cheese, cream cheese, and sour cream    · Meats and proteins:      ¨ High-fat cuts of meat (T-bone steak, regular hamburger, and ribs)    ¨ Fried meat, poultry (turkey and chicken), and fish    ¨ Poultry (chicken and turkey) with skin    ¨ Cold cuts (salami or bologna), hot dogs, calixto, and sausage    ¨ Whole eggs and egg yolks    · Vegetables and fruits with added fat:      ¨ Fried vegetables or vegetables in butter or high-fat sauces, such as cream or cheese sauces    ¨ Fried fruit or fruit served with butter or cream    · Fats:      ¨ Butter, stick margarine, and shortening    ¨ Coconut, palm oil, and palm kernel oil  Foods to include:   · Grains:      ¨ Whole-grain breads, cereals, pasta, and brown rice    ¨ Low-fat crackers and pretzels    · Vegetables and fruits:      ¨ Fresh, frozen, or canned vegetables (no salt or low-sodium)    ¨ Fresh, frozen, dried, or canned fruit (canned in light syrup or fruit juice)    ¨ Avocado    · Low-fat dairy products:      ¨ Nonfat (skim) or 1% milk    ¨ Nonfat or low-fat cheese, yogurt, and cottage cheese    · Meats and proteins:      ¨ Chicken or turkey with no skin    ¨ Baked or broiled fish    ¨ Lean beef and pork (loin, round, extra lean hamburger)    ¨ Beans and peas, unsalted nuts, soy products    ¨ Egg whites and substitutes    ¨ Seeds and nuts    · Fats:      ¨ Unsaturated oil, such as canola, olive, peanut, soybean, or sunflower oil    ¨ Soft or liquid margarine and vegetable oil spread    ¨ Low-fat salad dressing  Other ways to decrease fat:   · Read food labels before you buy foods  Choose foods that have less than 30% of calories from fat  Choose low-fat or fat-free dairy products  Remember that fat free does not mean calorie free  These foods still contain calories, and too many calories can lead to weight gain  · Trim fat from meat and avoid fried food  Trim all visible fat from meat before you cook it  Remove the skin from poultry  Do not zarco meat, fish, or poultry  Bake, roast, boil, or broil these foods instead  Avoid fried foods  Eat a baked potato instead of Western Ina fries  Steam vegetables instead of sautéing them in butter  · Add less fat to foods  Use imitation calixto bits on salads and baked potatoes instead of regular calixto bits  Use fat-free or low-fat salad dressings instead of regular dressings  Use low-fat or nonfat butter-flavored topping instead of regular butter or margarine on popcorn and other foods  Ways to decrease fat in recipes:  Replace high-fat ingredients with low-fat or nonfat ones  This may cause baked goods to be drier than usual  You may need to use nonfat cooking spray on pans to prevent food from sticking  You also may need to change the amount of other ingredients, such as water, in the recipe  Try the following:  · Use low-fat or light margarine instead of regular margarine or shortening       · Use lean ground turkey breast or chicken, or lean ground beef (less than 5% fat) instead of hamburger  · Add 1 teaspoon of canola oil to 8 ounces of skim milk instead of using cream or half and half  · Use grated zucchini, carrots, or apples in breads instead of coconut  · Use blenderized, low-fat cottage cheese, plain tofu, or low-fat ricotta cheese instead of cream cheese  · Use 1 egg white and 1 teaspoon of canola oil, or use ¼ cup (2 ounces) of fat-free egg substitute instead of a whole egg  · Replace half of the oil that is called for in a recipe with applesauce when you bake  Use 3 tablespoons of cocoa powder and 1 tablespoon of canola oil instead of a square of baking chocolate  How to increase fiber:  Eat enough high-fiber foods to get 20 to 30 grams of fiber every day  Slowly increase your fiber intake to avoid stomach cramps, gas, and other problems  · Eat 3 ounces of whole-grain foods each day  An ounce is about 1 slice of bread  Eat whole-grain breads, such as whole-wheat bread  Whole wheat, whole-wheat flour, or other whole grains should be listed as the first ingredient on the food label  Replace white flour with whole-grain flour or use half of each in recipes  Whole-grain flour is heavier than white flour, so you may have to add more yeast or baking powder  · Eat a high-fiber cereal for breakfast   Oatmeal is a good source of soluble fiber  Look for cereals that have bran or fiber in the name  Choose whole-grain products, such as brown rice, barley, and whole-wheat pasta  · Eat more beans, peas, and lentils  For example, add beans to soups or salads  Eat at least 5 cups of fruits and vegetables each day  Eat fruits and vegetables with the peel because the peel is high in fiber  © 2017 [address]  Information is for End User's use only and may not be sold, redistributed or otherwise used for commercial purposes  All illustrations and images included in CareNotes® are the copyrighted property of [employer]  or [employer]    The above information is an  only  It is not intended as medical advice for individual conditions or treatments  Talk to your doctor, nurse or pharmacist before following any medical regimen to see if it is safe and effective for you  Heart Healthy Diet   AMBULATORY CARE:   A heart healthy diet  is an eating plan low in total fat, unhealthy fats, and sodium (salt)  A heart healthy diet helps decrease your risk for heart disease and stroke  Limit the amount of fat you eat to 25% to 35% of your total daily calories  Limit sodium to less than 2,300 mg each day  Healthy fats:  Healthy fats can help improve cholesterol levels  The risk for heart disease is decreased when cholesterol levels are normal  Choose healthy fats, such as the following:  · Unsaturated fat  is found in foods such as soybean, canola, olive, corn, and safflower oils  It is also found in soft tub margarine that is made with liquid vegetable oil  · Omega-3 fat  is found in certain fish, such as salmon, tuna, and trout, and in walnuts and flaxseed  Unhealthy fats:  Unhealthy fats can cause unhealthy cholesterol levels in your blood and increase your risk of heart disease  Limit unhealthy fats, such as the following:  · Cholesterol  is found in animal foods, such as eggs and lobster, and in dairy products made from whole milk  Limit cholesterol to less than 300 milligrams (mg) each day  You may need to limit cholesterol to 200 mg each day if you have heart disease  · Saturated fat  is found in meats, such as calixto and hamburger  It is also found in chicken or turkey skin, whole milk, and butter  Limit saturated fat to less than 7% of your total daily calories  Limit saturated fat to less than 6% if you have heart disease or are at increased risk for it  · Trans fat  is found in packaged foods, such as potato chips and cookies  It is also in hard margarine, some fried foods, and shortening   Avoid trans fats as much as possible    Heart healthy foods and drinks to include:  Ask your dietitian or healthcare provider how many servings to have from each of the following food groups:  · Grains:      ¨ Whole-wheat breads, cereals, and pastas, and brown rice    ¨ Low-fat, low-sodium crackers and chips    · Vegetables:      ¨ Broccoli, green beans, green peas, and spinach    ¨ Collards, kale, and lima beans    ¨ Carrots, sweet potatoes, tomatoes, and peppers    ¨ Canned vegetables with no salt added    · Fruits:      ¨ Bananas, peaches, pears, and pineapple    ¨ Grapes, raisins, and dates    ¨ Oranges, tangerines, grapefruit, orange juice, and grapefruit juice    ¨ Apricots, mangoes, melons, and papaya    ¨ Raspberries and strawberries    ¨ Canned fruit with no added sugar    · Low-fat dairy products:      ¨ Nonfat (skim) milk, 1% milk, and low-fat almond, cashew, or soy milks fortified with calcium    ¨ Low-fat cheese, regular or frozen yogurt, and cottage cheese    · Meats and proteins , such as lean cuts of beef and pork (loin, leg, round), skinless chicken and turkey, legumes, soy products, egg whites, and nuts  Foods and drinks to limit or avoid:  Ask your dietitian or healthcare provider about these and other foods that are high in unhealthy fat, sodium, and sugar:  · Snack or packaged foods , such as frozen dinners, cookies, macaroni and cheese, and cereals with more than 300 mg of sodium per serving    · Canned or dry mixes  for cakes, soups, sauces, or gravies    · Vegetables with added sodium , such as instant potatoes, vegetables with added sauces, or regular canned vegetables    · Other foods high in sodium , such as ketchup, barbecue sauce, salad dressing, pickles, olives, soy sauce, and miso    · High-fat dairy foods  such as whole or 2% milk, cream cheese, or sour cream, and cheeses     · High-fat protein foods  such as high-fat cuts of beef (T-bone steaks, ribs), chicken or turkey with skin, and organ meats, such as liver    · Cured or smoked meats , such as hot dogs, calixto, and sausage    · Unhealthy fats and oils , such as butter, stick margarine, shortening, and cooking oils such as coconut or palm oil    · Food and drinks high in sugar , such as soft drinks (soda), sports drinks, sweetened tea, candy, cake, cookies, pies, and doughnuts  Other diet guidelines to follow:   · Eat more foods containing omega-3 fats  Eat fish high in omega-3 fats at least 2 times a week  · Limit alcohol  Too much alcohol can damage your heart and raise your blood pressure  Women should limit alcohol to 1 drink a day  Men should limit alcohol to 2 drinks a day  A drink of alcohol is 12 ounces of beer, 5 ounces of wine, or 1½ ounces of liquor  · Choose low-sodium foods  High-sodium foods can lead to high blood pressure  Add little or no salt to food you prepare  Use herbs and spices in place of salt  · Eat more fiber  to help lower cholesterol levels  Eat at least 5 servings of fruits and vegetables each day  Eat 3 ounces of whole-grain foods each day  Legumes (beans) are also a good source of fiber  Lifestyle guidelines:   · Do not smoke  Nicotine and other chemicals in cigarettes and cigars can cause lung and heart damage  Ask your healthcare provider for information if you currently smoke and need help to quit  E-cigarettes or smokeless tobacco still contain nicotine  Talk to your healthcare provider before you use these products  · Exercise regularly  to help you maintain a healthy weight and improve your blood pressure and cholesterol levels  Ask your healthcare provider about the best exercise plan for you  Do not start an exercise program without asking your healthcare provider  Follow up with your healthcare provider as directed:  Write down your questions so you remember to ask them during your visits     © 2017 [address] Information is for End User's use only and may not be sold, redistributed or otherwise used for commercial purposes  All illustrations and images included in CareNotes® are the copyrighted property of [employer]  or [employer]  The above information is an  only  It is not intended as medical advice for individual conditions or treatments  Talk to your doctor, nurse or pharmacist before following any medical regimen to see if it is safe and effective for you

## 2020-08-04 ENCOUNTER — TELEPHONE (OUTPATIENT)
Dept: SURGICAL ONCOLOGY | Facility: CLINIC | Age: 40
End: 2020-08-04

## 2020-08-04 NOTE — TELEPHONE ENCOUNTER
Called patient to discuss genetic testing results  There was no answer; message left with call back number provided  Patient returned the phone call  Explained that her genetic testing came back negative for any mutations  Patient verbalized understanding and was appreciative of the phone call  Informed patient that a copy of these results would be provided to her at her pre-op appointment with Dr Socorro Santoyo on 8/17

## 2020-08-05 ENCOUNTER — APPOINTMENT (OUTPATIENT)
Dept: LAB | Age: 40
End: 2020-08-05
Payer: COMMERCIAL

## 2020-08-05 ENCOUNTER — TRANSCRIBE ORDERS (OUTPATIENT)
Dept: ADMINISTRATIVE | Age: 40
End: 2020-08-05

## 2020-08-05 DIAGNOSIS — Z01.419 ENCOUNTER FOR ANNUAL ROUTINE GYNECOLOGICAL EXAMINATION: ICD-10-CM

## 2020-08-05 LAB
ALBUMIN SERPL BCP-MCNC: 4.1 G/DL (ref 3.5–5)
ALP SERPL-CCNC: 79 U/L (ref 46–116)
ALT SERPL W P-5'-P-CCNC: 44 U/L (ref 12–78)
ANION GAP SERPL CALCULATED.3IONS-SCNC: 10 MMOL/L (ref 4–13)
AST SERPL W P-5'-P-CCNC: 17 U/L (ref 5–45)
BILIRUB SERPL-MCNC: 0.79 MG/DL (ref 0.2–1)
BUN SERPL-MCNC: 10 MG/DL (ref 5–25)
CALCIUM SERPL-MCNC: 9.3 MG/DL (ref 8.3–10.1)
CHLORIDE SERPL-SCNC: 107 MMOL/L (ref 100–108)
CHOLEST SERPL-MCNC: 221 MG/DL (ref 50–200)
CO2 SERPL-SCNC: 22 MMOL/L (ref 21–32)
CREAT SERPL-MCNC: 0.64 MG/DL (ref 0.6–1.3)
GFR SERPL CREATININE-BSD FRML MDRD: 110 ML/MIN/1.73SQ M
GLUCOSE P FAST SERPL-MCNC: 90 MG/DL (ref 65–99)
HBV SURFACE AG SER QL: NORMAL
HDLC SERPL-MCNC: 80 MG/DL
HIV 1+2 AB+HIV1 P24 AG SERPL QL IA: NORMAL
HIV1 P24 AG SER QL: NORMAL
LDLC SERPL CALC-MCNC: 121 MG/DL (ref 0–100)
POTASSIUM SERPL-SCNC: 4.2 MMOL/L (ref 3.5–5.3)
PROT SERPL-MCNC: 8.1 G/DL (ref 6.4–8.2)
RPR SER QL: NORMAL
SODIUM SERPL-SCNC: 139 MMOL/L (ref 136–145)
TRIGL SERPL-MCNC: 99 MG/DL
TSH SERPL DL<=0.05 MIU/L-ACNC: 2.01 UIU/ML (ref 0.36–3.74)

## 2020-08-05 PROCEDURE — 87340 HEPATITIS B SURFACE AG IA: CPT

## 2020-08-05 PROCEDURE — 84443 ASSAY THYROID STIM HORMONE: CPT

## 2020-08-05 PROCEDURE — 80053 COMPREHEN METABOLIC PANEL: CPT | Performed by: PHYSICIAN ASSISTANT

## 2020-08-05 PROCEDURE — 80061 LIPID PANEL: CPT | Performed by: PHYSICIAN ASSISTANT

## 2020-08-05 PROCEDURE — 87806 HIV AG W/HIV1&2 ANTB W/OPTIC: CPT

## 2020-08-05 PROCEDURE — 36415 COLL VENOUS BLD VENIPUNCTURE: CPT | Performed by: PHYSICIAN ASSISTANT

## 2020-08-05 PROCEDURE — 86592 SYPHILIS TEST NON-TREP QUAL: CPT

## 2020-08-07 DIAGNOSIS — E78.00 PURE HYPERCHOLESTEROLEMIA: Primary | Chronic | ICD-10-CM

## 2020-08-14 ENCOUNTER — TELEPHONE (OUTPATIENT)
Dept: SURGICAL ONCOLOGY | Facility: CLINIC | Age: 40
End: 2020-08-14

## 2020-08-17 ENCOUNTER — OFFICE VISIT (OUTPATIENT)
Dept: SURGICAL ONCOLOGY | Facility: CLINIC | Age: 40
End: 2020-08-17
Payer: COMMERCIAL

## 2020-08-17 ENCOUNTER — LAB (OUTPATIENT)
Dept: LAB | Facility: CLINIC | Age: 40
End: 2020-08-17
Payer: COMMERCIAL

## 2020-08-17 ENCOUNTER — HOSPITAL ENCOUNTER (OUTPATIENT)
Dept: RADIOLOGY | Facility: HOSPITAL | Age: 40
Discharge: HOME | End: 2020-08-17
Attending: SURGERY
Payer: COMMERCIAL

## 2020-08-17 ENCOUNTER — APPOINTMENT (OUTPATIENT)
Dept: LAB | Facility: CLINIC | Age: 40
End: 2020-08-17
Payer: COMMERCIAL

## 2020-08-17 VITALS
WEIGHT: 222 LBS | BODY MASS INDEX: 34.84 KG/M2 | RESPIRATION RATE: 16 BRPM | DIASTOLIC BLOOD PRESSURE: 78 MMHG | SYSTOLIC BLOOD PRESSURE: 116 MMHG | HEART RATE: 74 BPM | HEIGHT: 67 IN | TEMPERATURE: 98 F

## 2020-08-17 DIAGNOSIS — D05.12 DUCTAL CARCINOMA IN SITU (DCIS) OF LEFT BREAST: ICD-10-CM

## 2020-08-17 DIAGNOSIS — Z01.818 PREOP EXAMINATION: Primary | ICD-10-CM

## 2020-08-17 LAB
BASOPHILS # BLD AUTO: 0.03 THOUSANDS/ΜL (ref 0–0.1)
BASOPHILS NFR BLD AUTO: 0 % (ref 0–1)
EOSINOPHIL # BLD AUTO: 0.15 THOUSAND/ΜL (ref 0–0.61)
EOSINOPHIL NFR BLD AUTO: 2 % (ref 0–6)
ERYTHROCYTE [DISTWIDTH] IN BLOOD BY AUTOMATED COUNT: 12.8 % (ref 11.6–15.1)
HCT VFR BLD AUTO: 42.4 % (ref 34.8–46.1)
HGB BLD-MCNC: 14.1 G/DL (ref 11.5–15.4)
IMM GRANULOCYTES # BLD AUTO: 0.06 THOUSAND/UL (ref 0–0.2)
IMM GRANULOCYTES NFR BLD AUTO: 1 % (ref 0–2)
LYMPHOCYTES # BLD AUTO: 2.61 THOUSANDS/ΜL (ref 0.6–4.47)
LYMPHOCYTES NFR BLD AUTO: 26 % (ref 14–44)
MCH RBC QN AUTO: 31.1 PG (ref 26.8–34.3)
MCHC RBC AUTO-ENTMCNC: 33.3 G/DL (ref 31.4–37.4)
MCV RBC AUTO: 94 FL (ref 82–98)
MONOCYTES # BLD AUTO: 0.54 THOUSAND/ΜL (ref 0.17–1.22)
MONOCYTES NFR BLD AUTO: 5 % (ref 4–12)
NEUTROPHILS # BLD AUTO: 6.83 THOUSANDS/ΜL (ref 1.85–7.62)
NEUTS SEG NFR BLD AUTO: 66 % (ref 43–75)
NRBC BLD AUTO-RTO: 0 /100 WBCS
PLATELET # BLD AUTO: 448 THOUSANDS/UL (ref 149–390)
PMV BLD AUTO: 9.6 FL (ref 8.9–12.7)
RBC # BLD AUTO: 4.53 MILLION/UL (ref 3.81–5.12)
WBC # BLD AUTO: 10.22 THOUSAND/UL (ref 4.31–10.16)

## 2020-08-17 PROCEDURE — 99214 OFFICE O/P EST MOD 30 MIN: CPT | Performed by: SURGERY

## 2020-08-17 PROCEDURE — 93005 ELECTROCARDIOGRAM TRACING: CPT

## 2020-08-17 PROCEDURE — 3008F BODY MASS INDEX DOCD: CPT | Performed by: SURGERY

## 2020-08-17 PROCEDURE — 71046 X-RAY EXAM CHEST 2 VIEWS: CPT

## 2020-08-17 PROCEDURE — 36415 COLL VENOUS BLD VENIPUNCTURE: CPT

## 2020-08-17 PROCEDURE — 85025 COMPLETE CBC W/AUTO DIFF WBC: CPT

## 2020-08-17 PROCEDURE — 1036F TOBACCO NON-USER: CPT | Performed by: SURGERY

## 2020-08-17 RX ORDER — OXYCODONE HYDROCHLORIDE AND ACETAMINOPHEN 5; 325 MG/1; MG/1
1 TABLET ORAL EVERY 6 HOURS PRN
Qty: 6 TABLET | Refills: 0 | Status: SHIPPED | OUTPATIENT
Start: 2020-08-17 | End: 2021-06-16 | Stop reason: ALTCHOICE

## 2020-08-17 NOTE — H&P (VIEW-ONLY)
Surgical Oncology Follow Up       [address]  [facility] [place]  [address] [facility] [address]    Maria Luisa Heathil  1980  [id number]  [address] [facility],[address]  [facility] [place]  [address] [address]    Chief Complaint   Patient presents with    Pre-op Exam          Assessment & Plan:   Patient presents for a follow-up visit  Her genetic testing was negative  We went through the process of informed consent for left breast needle localization lumpectomy for her ductal carcinoma in situ  The patient will have her IUD removed in the near future  Cancer History:     Oncology History   Ductal carcinoma in situ (DCIS) of left breast   7/20/2020 Biopsy    Left breast stereotactic biopsy  11 o'clock, anterior depth  Ductal carcinoma in situ  Grade 2        Concordant  Malignancy appears unifocal  Calcifications spanned 5 mm  Right breast clear  7/24/2020 Genetic Testing    The following genes were evaluated: AARON, BRCA1, BRCA2, CDH1, CHEK2, PALB2, PTEN, STK11, TP53  Additional genes were analyzed for a total of 20 genes  Negative result  No pathogenic sequence variants or deletions/dupllications identified  [facility]           Interval History:   Patient's genetic testing was negative  She has no complaints referable to her breast     Review of Systems:   Review of Systems   All other systems reviewed and are negative        Past Medical History     Patient Active Problem List   Diagnosis    Anxiety    Ductal carcinoma in situ (DCIS) of left breast    Class 1 obesity due to excess calories without serious comorbidity with body mass index (BMI) of 33 0 to 33 9 in adult    Pure hypercholesterolemia     Past Medical History:   Diagnosis Date    Abnormal Pap smear of cervix     Anxiety 01/2015    Last assessed    BRCA gene mutation negative 07/24/2020    [facility] 20 gene panel    HPV (human papilloma virus) infection     unsure     Past Surgical History:   Procedure Laterality Date    CHOLECYSTECTOMY LAPAROSCOPIC  2015    Last assessed    COLPOSCOPY      MAMMO STEREOTACTIC BREAST BIOPSY LEFT (ALL INC) Left 2020    MOUTH SURGERY       Family History   Problem Relation Age of Onset    Heart attack Father         Acute    Heart failure Father     Coronary artery disease Father     Diabetes Father         DM    Hypertension Father     No Known Problems Maternal Grandmother     Alzheimer's disease Maternal Grandfather     Stroke Paternal Grandmother     Prostate cancer Paternal Grandfather 76    No Known Problems Mother     No Known Problems Son     No Known Problems Sister     No Known Problems Half-Sister     No Known Problems Half-Sister     No Known Problems Brother     No Known Problems Maternal Uncle     No Known Problems Maternal Uncle     No Known Problems Maternal Aunt     No Known Problems Maternal Aunt     No Known Problems Maternal Aunt     No Known Problems Paternal Uncle     No Known Problems Paternal Uncle      Social History     Socioeconomic History    Marital status: Single     Spouse name: Not on file    Number of children: Not on file    Years of education: Not on file    Highest education level: Not on file   Occupational History    Not on file   Social Needs    Financial resource strain: Not hard at all   [employer] insecurity     Worry: Never true     Inability: Never true   [employer] needs     Medical: No     Non-medical: No   Tobacco Use    Smoking status: Former Smoker     Packs/day: 1 00     Types: Cigarettes     Last attempt to quit: 2019     Years since quittin 3    Smokeless tobacco: Never Used   Substance and Sexual Activity    Alcohol use:  Yes     Alcohol/week: 2 0 standard drinks     Types: 2 Standard drinks or equivalent per week     Frequency: Monthly or less     Drinks per session: 1 or 2     Binge frequency: Never    Drug use: Not Currently     Types: Marijuana    Sexual activity: Not Currently     Partners: Male     Birth control/protection: I U D  Lifestyle    Physical activity     Days per week: 0 days     Minutes per session: 0 min    Stress: To some extent   Relationships    Social connections     Talks on phone: More than three times a week     Gets together: Three times a week     Attends Sikh service: Never     Active member of club or organization: No     Attends meetings of clubs or organizations: Never     Relationship status: Never     Intimate partner violence     Fear of current or ex partner: No     Emotionally abused: No     Physically abused: No     Forced sexual activity: No   Other Topics Concern    Not on file   Social History Narrative    Caffeine use, Does not exercise, Full-time work, One child, Single,        Current Outpatient Medications:     Biotin 5000 MCG CAPS, Take by mouth, Disp: , Rfl:     Blood Pressure Monitoring (BLOOD PRESSURE MONITOR/M CUFF) MISC, by Does not apply route daily, Disp: 1 each, Rfl: 0    cetirizine (ZyrTEC) 10 mg tablet, Take 10 mg by mouth daily, Disp: , Rfl:     DAILY MULTIPLE VITAMINS/IRON TABS, Take by mouth, Disp: , Rfl:     levonorgestrel (MIRENA) 20 MCG/24HR IUD, 1 each by Intrauterine route once, Disp: , Rfl:     Probiotic Product (ACIDOPHILUS PROBIOTIC BLEND) CAPS, Take by mouth, Disp: , Rfl:   No Known Allergies    Physical Exam:     Vitals:    08/17/20 1442   BP: 116/78   Pulse: 74   Resp: 16   Temp: 98 °F (36 7 °C)     Physical Exam  Vitals signs reviewed  Exam conducted with a chaperone present  Constitutional:       Appearance: She is well-developed  HENT:      Head: Normocephalic and atraumatic  Eyes:      Pupils: Pupils are equal, round, and reactive to light  Neck:      Musculoskeletal: Normal range of motion and neck supple  Thyroid: No thyromegaly  Vascular: No JVD  Trachea: No tracheal deviation  Cardiovascular:      Rate and Rhythm: Normal rate and regular rhythm  Heart sounds: Normal heart sounds  No murmur  No friction rub  No gallop  Pulmonary:      Effort: Pulmonary effort is normal  No respiratory distress  Breath sounds: Normal breath sounds  No wheezing or rales  Chest:      Breasts: Breasts are symmetrical          Right: No inverted nipple, mass, nipple discharge, skin change or tenderness  Left: No inverted nipple, mass, nipple discharge, skin change or tenderness  Comments: The right breast was examined in the sitting and supine position  There are no worrisome skin changes, tenderness, inverted nipple, nipple discharge, swelling, bleeding or evidence of a mass in any quadrant  Shravan survey demonstrated no evidence of any clinically suspicious axillary, pectoral or paraclavicular lymph nodes  The left breast was examined in the sitting and supine position  There are no worrisome skin changes, tenderness, inverted nipple, nipple discharge, swelling, bleeding or evidence of a mass in any quadrant  Shravan survey demonstrated no evidence of any clinically suspicious axillary, pectoral or paraclavicular lymph nodes  Abdominal:      General: There is no distension  Palpations: Abdomen is soft  There is no hepatomegaly or mass  Tenderness: There is no abdominal tenderness  There is no guarding or rebound  Musculoskeletal: Normal range of motion  General: No tenderness  Lymphadenopathy:      Cervical: No cervical adenopathy  Skin:     General: Skin is warm and dry  Findings: No erythema or rash  Neurological:      Mental Status: She is alert and oriented to person, place, and time  Cranial Nerves: No cranial nerve deficit  Psychiatric:         Behavior: Behavior normal            Results & Discussion: This is a stage 0 left breast cancer  She is ER positive [place] positive    I recommended she have her IUD removed she is agreeable to this  She is a good candidate for breast conservation therapy in that her option  She prefers not to have radiation therapy  We did talk about the prelude test previously and reviewed that as well  The patient and I underwent the process of consent for left breast needle localization in conjunction with a left breast lumpectomy  The complications outlined on the consent including relatively minor problems (wound infection, wound healing problems, hematomas, scarring, chronic discomfort/pain), moderate problems (injury to nerves or blood vessels, allergic reactions) and major complications (extensive blood loss requiring transfusions or possible addtional surgeries, cardiac arrest, stroke and possible death)  We also reviewed specific complications as outlined on the consent form including possible wire migration, need for additional biopsies or treatments  All questions were answered to the patient's satisfaction  We will coordinate the surgery at our next mutual convience  Advance Care Planning/Advance Directives:  I discussed the disease status, treatment plans and follow-up with the patient

## 2020-08-17 NOTE — PROGRESS NOTES
Surgical Oncology Follow Up       [address]  [facility] [place]  [address] [facility] [address]    Maday Coles  1980  [id number]  [address]  [facility] [place]  [address] [address]    Chief Complaint   Patient presents with    Pre-op Exam          Assessment & Plan:   Patient presents for a follow-up visit  Her genetic testing was negative  We went through the process of informed consent for left breast needle localization lumpectomy for her ductal carcinoma in situ  The patient will have her IUD removed in the near future  Cancer History:     Oncology History   Ductal carcinoma in situ (DCIS) of left breast   7/20/2020 Biopsy    Left breast stereotactic biopsy  11 o'clock, anterior depth  Ductal carcinoma in situ  Grade 2        Concordant  Malignancy appears unifocal  Calcifications spanned 5 mm  Right breast clear  7/24/2020 Genetic Testing    The following genes were evaluated: AARON, BRCA1, BRCA2, CDH1, CHEK2, PALB2, PTEN, STK11, TP53  Additional genes were analyzed for a total of 20 genes  Negative result  No pathogenic sequence variants or deletions/dupllications identified  [facility]           Interval History:   Patient's genetic testing was negative  She has no complaints referable to her breast     Review of Systems:   Review of Systems   All other systems reviewed and are negative        Past Medical History     Patient Active Problem List   Diagnosis    Anxiety    Ductal carcinoma in situ (DCIS) of left breast    Class 1 obesity due to excess calories without serious comorbidity with body mass index (BMI) of 33 0 to 33 9 in adult    Pure hypercholesterolemia     Past Medical History:   Diagnosis Date    Abnormal Pap smear of cervix     Anxiety 01/2015    Last assessed    BRCA gene mutation negative 07/24/2020    [facility] 20 gene panel    HPV (human papilloma virus) infection     unsure     Past Surgical History:   Procedure Laterality Date    CHOLECYSTECTOMY LAPAROSCOPIC  2015    Last assessed    COLPOSCOPY      MAMMO STEREOTACTIC BREAST BIOPSY LEFT (ALL INC) Left 2020    MOUTH SURGERY       Family History   Problem Relation Age of Onset    Heart attack Father         Acute    Heart failure Father     Coronary artery disease Father     Diabetes Father         DM    Hypertension Father     No Known Problems Maternal Grandmother     Alzheimer's disease Maternal Grandfather     Stroke Paternal Grandmother     Prostate cancer Paternal Grandfather 76    No Known Problems Mother     No Known Problems Son     No Known Problems Sister     No Known Problems Half-Sister     No Known Problems Half-Sister     No Known Problems Brother     No Known Problems Maternal Uncle     No Known Problems Maternal Uncle     No Known Problems Maternal Aunt     No Known Problems Maternal Aunt     No Known Problems Maternal Aunt     No Known Problems Paternal Uncle     No Known Problems Paternal Uncle      Social History     Socioeconomic History    Marital status: Single     Spouse name: Not on file    Number of children: Not on file    Years of education: Not on file    Highest education level: Not on file   Occupational History    Not on file   Social Needs    Financial resource strain: Not hard at all   [employer] insecurity     Worry: Never true     Inability: Never true   [employer] needs     Medical: No     Non-medical: No   Tobacco Use    Smoking status: Former Smoker     Packs/day: 1 00     Types: Cigarettes     Last attempt to quit: 2019     Years since quittin 3    Smokeless tobacco: Never Used   Substance and Sexual Activity    Alcohol use:  Yes     Alcohol/week: 2 0 standard drinks     Types: 2 Standard drinks or equivalent per week     Frequency: Monthly or less     Drinks per session: 1 or 2     Binge frequency: Never    Drug use: Not Currently     Types: Marijuana    Sexual activity: Not Currently     Partners: Male     Birth control/protection: I U D  Lifestyle    Physical activity     Days per week: 0 days     Minutes per session: 0 min    Stress: To some extent   Relationships    Social connections     Talks on phone: More than three times a week     Gets together: Three times a week     Attends Baptism service: Never     Active member of club or organization: No     Attends meetings of clubs or organizations: Never     Relationship status: Never     Intimate partner violence     Fear of current or ex partner: No     Emotionally abused: No     Physically abused: No     Forced sexual activity: No   Other Topics Concern    Not on file   Social History Narrative    Caffeine use, Does not exercise, Full-time work, One child, Single,        Current Outpatient Medications:     Biotin 5000 MCG CAPS, Take by mouth, Disp: , Rfl:     Blood Pressure Monitoring (BLOOD PRESSURE MONITOR/M CUFF) MISC, by Does not apply route daily, Disp: 1 each, Rfl: 0    cetirizine (ZyrTEC) 10 mg tablet, Take 10 mg by mouth daily, Disp: , Rfl:     DAILY MULTIPLE VITAMINS/IRON TABS, Take by mouth, Disp: , Rfl:     levonorgestrel (MIRENA) 20 MCG/24HR IUD, 1 each by Intrauterine route once, Disp: , Rfl:     Probiotic Product (ACIDOPHILUS PROBIOTIC BLEND) CAPS, Take by mouth, Disp: , Rfl:   No Known Allergies    Physical Exam:     Vitals:    08/17/20 1442   BP: 116/78   Pulse: 74   Resp: 16   Temp: 98 °F (36 7 °C)     Physical Exam  Vitals signs reviewed  Exam conducted with a chaperone present  Constitutional:       Appearance: She is well-developed  HENT:      Head: Normocephalic and atraumatic  Eyes:      Pupils: Pupils are equal, round, and reactive to light  Neck:      Musculoskeletal: Normal range of motion and neck supple  Thyroid: No thyromegaly  Vascular: No JVD  Trachea: No tracheal deviation  Cardiovascular:      Rate and Rhythm: Normal rate and regular rhythm  Heart sounds: Normal heart sounds  No murmur  No friction rub  No gallop  Pulmonary:      Effort: Pulmonary effort is normal  No respiratory distress  Breath sounds: Normal breath sounds  No wheezing or rales  Chest:      Breasts: Breasts are symmetrical          Right: No inverted nipple, mass, nipple discharge, skin change or tenderness  Left: No inverted nipple, mass, nipple discharge, skin change or tenderness  Comments: The right breast was examined in the sitting and supine position  There are no worrisome skin changes, tenderness, inverted nipple, nipple discharge, swelling, bleeding or evidence of a mass in any quadrant  Shravan survey demonstrated no evidence of any clinically suspicious axillary, pectoral or paraclavicular lymph nodes  The left breast was examined in the sitting and supine position  There are no worrisome skin changes, tenderness, inverted nipple, nipple discharge, swelling, bleeding or evidence of a mass in any quadrant  Shravan survey demonstrated no evidence of any clinically suspicious axillary, pectoral or paraclavicular lymph nodes  Abdominal:      General: There is no distension  Palpations: Abdomen is soft  There is no hepatomegaly or mass  Tenderness: There is no abdominal tenderness  There is no guarding or rebound  Musculoskeletal: Normal range of motion  General: No tenderness  Lymphadenopathy:      Cervical: No cervical adenopathy  Skin:     General: Skin is warm and dry  Findings: No erythema or rash  Neurological:      Mental Status: She is alert and oriented to person, place, and time  Cranial Nerves: No cranial nerve deficit  Psychiatric:         Behavior: Behavior normal            Results & Discussion: This is a stage 0 left breast cancer  She is ER positive [place] positive    I recommended she have her IUD removed she is agreeable to this  She is a good candidate for breast conservation therapy in that her option  She prefers not to have radiation therapy  We did talk about the prelude test previously and reviewed that as well  The patient and I underwent the process of consent for left breast needle localization in conjunction with a left breast lumpectomy  The complications outlined on the consent including relatively minor problems (wound infection, wound healing problems, hematomas, scarring, chronic discomfort/pain), moderate problems (injury to nerves or blood vessels, allergic reactions) and major complications (extensive blood loss requiring transfusions or possible addtional surgeries, cardiac arrest, stroke and possible death)  We also reviewed specific complications as outlined on the consent form including possible wire migration, need for additional biopsies or treatments  All questions were answered to the patient's satisfaction  We will coordinate the surgery at our next mutual convience  Advance Care Planning/Advance Directives:  I discussed the disease status, treatment plans and follow-up with the patient

## 2020-08-17 NOTE — PATIENT INSTRUCTIONS
Pre-Surgery Instructions:   Medication Instructions    Biotin 5000 MCG CAPS Stop taking 1 week prior to surgery         cetirizine (ZyrTEC) 10 mg tablet Stop taking 1 days prior to surgery    DAILY MULTIPLE VITAMINS/IRON TABS Stop taking 1 week prior to surgery    levonorgestrel (MIRENA) 20 MCG/24HR IUD Stop taking - Follow up with OB/GYN's office    Probiotic Product (ACIDOPHILUS PROBIOTIC BLEND) CAPS Stop taking 1 week prior to surgery     Please stop all supplements and vitamins one week prior to your surgery date  Breast Lumpectomy   AMBULATORY CARE:   What you need to know about a lumpectomy:  A lumpectomy is surgery to remove a mass in your breast  Breast tissue that surrounds the mass may also be taken  A lumpectomy is also known as breast-conserving surgery, a partial mastectomy, or a segmental mastectomy  How to prepare for a lumpectomy:   · You may need a mammogram or ultrasound before surgery  These tests may be done the same day as your surgery or at an earlier time  Your healthcare provider may use pictures from these tests to maura the location of the mass  The marker will show him where to make your incision  · Your healthcare provider will talk to you about how to prepare for surgery  He may tell you not to eat or drink anything after midnight on the day of your surgery  He will tell you what medicines to take or not take on the day of your surgery  You may need to stop taking blood thinners or aspirin several days before your surgery  Arrange for someone to drive you home and stay with you for 24 hours after surgery  This person can help care for you, and monitor for any problems  What will happen during a lumpectomy:  You will be given general anesthesia to keep you asleep and free from pain during surgery  You may be given an antibiotic through your IV to help prevent a bacterial infection  Your healthcare provider will make an incision in your breast and remove the mass   He may also remove breast tissue or lymph nodes that are close to the mass  A drain may be inserted near your incision to remove extra fluid  This will decrease swelling and help your incision heal  Your healthcare provider will close your incision with stitches or strips of medical tape and cover it with a bandage  He may also wrap a tight-fitting bandage around both of your breasts  This may decrease swelling, bleeding, and pain  What will happen after a lumpectomy:  Healthcare providers will monitor you until you are awake  You may able to go home when you are awake and your pain is controlled  Instead you may need to spend the night in the hospital    Risks of a lumpectomy:  You may bleed more than expected or get an infection  Nerves, blood vessels, and muscles may be damaged during your surgery  You may have swelling in your arm closest to the lumpectomy or where lymph nodes were removed  This swelling is called lymphedema  Lymphedema may cause tingling, numbness, stiffness, and weakness in your arm  This may be permanent  You may get a blood clot in your arm or leg  The blood clot may travel to your heart lungs, or brain  This may become life-threatening  Call 911 for any of the following:   · You feel lightheaded, short of breath, and have chest pain  · You cough up blood  · You have trouble breathing  Seek care immediately if:   · Blood soaks through your bandage  · Your stitches come apart  · Your bruise suddenly gets bigger  · Your leg or arm is larger than normal and painful  Contact your healthcare provider if:   · You have a fever or chills  · Your wound is red, swollen, or draining pus  · You have nausea or are vomiting  · Your skin is itchy, swollen, or you have a rash  · Your pain does not get better after you take pain medicine  · Your drain falls out or stops draining fluid  · Your drain has pus or foul-smelling fluid coming out of it       · You have questions or concerns about your condition or care  Medicines: You may need any of the following:  · Antibiotics  help prevent a bacterial infection  · Prescription pain medicine  may be given  Ask your healthcare provider how to take this medicine safely  Some prescription pain medicines contain acetaminophen  Do not take other medicines that contain acetaminophen without talking to your healthcare provider  Too much acetaminophen may cause liver damage  Prescription pain medicine may cause constipation  Ask your healthcare provider how to prevent or treat constipation  · NSAIDs , such as ibuprofen, help decrease swelling, pain, and fever  NSAIDs can cause stomach bleeding or kidney problems in certain people  If you take blood thinner medicine, always ask your healthcare provider if NSAIDs are safe for you  Always read the medicine label and follow directions  · Take your medicine as directed  Contact your healthcare provider if you think your medicine is not helping or if you have side effects  Tell him or her if you are allergic to any medicine  Keep a list of the medicines, vitamins, and herbs you take  Include the amounts, and when and why you take them  Bring the list or the pill bottles to follow-up visits  Carry your medicine list with you in case of an emergency  Care for your wound as directed: If you have a tight-fitting bandage, you can remove it in 24 to 48 hours, or as directed  Ask your healthcare provider when your incision can get wet  You may need to take a sponge bath until your drain is removed  Carefully wash around the incision with soap and water  It is okay to allow the soap and water to gently run over your incision  Gently pat dry the area and put on new, clean bandages as directed  Change your bandages when they get wet or dirty  Check your incision every day for redness, pus, or swelling     Self-care:   · Apply ice  on your breast for 15 to 20 minutes every hour or as directed  Use an ice pack, or put crushed ice in a plastic bag  Cover it with a towel  Ice helps prevent tissue damage and decreases swelling and pain  · Rest  as directed  Do not lift anything heavy  Do not push or pull with your arms  Take short walks around the house  Gradually walk further as you feel better  Ask your healthcare provider when you can return to your normal activities  · Empty your drain  as directed  You may need to write down how much you empty from your drain each day  Ask your healthcare provider for more information about how to empty your drain  · Wear a supportive bra  as directed  Wait until you remove the tight-fitting bandage to wear a bra  You may be given a surgical bra or told to wear a sports bra  A supportive bra may help hold your bandages in place  It may also help with swelling and pain  Do not  wear bras with lace or underwire  They may rub against your incision and cause discomfort  Arm stretches: Your healthcare provider may show you how to do arm stretches  Arm stretches may prevent stiff arms or shoulders  You may need to wait until after your drains are removed to begin stretching  Do not do arm stretches until your healthcare provider says it is okay  Ask your healthcare provider for more information about arm stretches  Follow up with your healthcare provider as directed:  Write down your questions so you remember to ask them during your visits  © 2017 [facility] Information is for End User's use only and may not be sold, redistributed or otherwise used for commercial purposes  All illustrations and images included in CareNotes® are the copyrighted property of A D [employer]  or [employer]  The above information is an  only  It is not intended as medical advice for individual conditions or treatments   Talk to your doctor, nurse or pharmacist before following any medical regimen to see if it is safe and effective for you

## 2020-08-19 ENCOUNTER — TELEPHONE (OUTPATIENT)
Dept: OBGYN CLINIC | Facility: CLINIC | Age: 40
End: 2020-08-19

## 2020-08-19 LAB
ATRIAL RATE: 54 BPM
P AXIS: 42 DEGREES
PR INTERVAL: 158 MS
QRS AXIS: 53 DEGREES
QRSD INTERVAL: 66 MS
QT INTERVAL: 448 MS
QTC INTERVAL: 424 MS
T WAVE AXIS: 72 DEGREES
VENTRICULAR RATE: 54 BPM

## 2020-08-19 PROCEDURE — 93010 ELECTROCARDIOGRAM REPORT: CPT | Performed by: INTERNAL MEDICINE

## 2020-08-20 ENCOUNTER — PROCEDURE VISIT (OUTPATIENT)
Dept: OBGYN CLINIC | Facility: CLINIC | Age: 40
End: 2020-08-20

## 2020-08-20 VITALS
SYSTOLIC BLOOD PRESSURE: 138 MMHG | HEART RATE: 73 BPM | TEMPERATURE: 98.8 F | BODY MASS INDEX: 35.03 KG/M2 | WEIGHT: 218 LBS | HEIGHT: 66 IN | DIASTOLIC BLOOD PRESSURE: 83 MMHG

## 2020-08-20 DIAGNOSIS — Z30.432 ENCOUNTER FOR IUD REMOVAL: Primary | ICD-10-CM

## 2020-08-20 PROCEDURE — 58301 REMOVE INTRAUTERINE DEVICE: CPT | Performed by: NURSE PRACTITIONER

## 2020-08-20 PROCEDURE — 3008F BODY MASS INDEX DOCD: CPT | Performed by: SURGERY

## 2020-08-20 PROCEDURE — 3008F BODY MASS INDEX DOCD: CPT | Performed by: PHYSICIAN ASSISTANT

## 2020-08-20 NOTE — PROGRESS NOTES
Iud removal    Date/Time: 2020 10:15 AM  Performed by: ZEB Brooks  Authorized by: ZEB Brooks     Consent:     Consent obtained:  Verbal    Consent given by:  Patient    Procedure risks and benefits discussed: yes      Patient questions answered: yes      Patient agrees, verbalizes understanding, and wants to proceed: yes      Educational handouts given: yes      Instructions and paperwork completed: yes    Universal protocol:     Patient states understanding of procedure being performed: yes      Relevant documents present and verified: yes      Test results available and properly labeled: na       Imaging studies available: no      Required blood products, implants, devices, and special equipment available: yes      Site marked: no    Procedure:     Removed with no complications: yes      Removal due to mechanical complications of IUD: no      Removal due to infection and inflammatory reaction: no      Other reason for removal:  Diagnosis of breast cancer  Comments:       here for Mirena IUD removal   Mirena IUD was placed 17  Mirena IUD being removed for recent diagnosis of breast cancer  Patient plans to use condoms for contraception  Reviewed option of ParaGard IUD, nonhormonal   Patient verbalizes understanding declines information today's visit  Removed without difficulty  Patient tolerated well    Last Pap smear 20- NILM/ HR HPV negative     RTO 2021 or sooner as needed

## 2020-08-28 ENCOUNTER — APPOINTMENT (OUTPATIENT)
Dept: MAMMOGRAPHY | Facility: HOSPITAL | Age: 40
End: 2020-08-28
Payer: COMMERCIAL

## 2020-08-28 ENCOUNTER — HOSPITAL ENCOUNTER (OUTPATIENT)
Dept: MAMMOGRAPHY | Facility: HOSPITAL | Age: 40
Discharge: HOME | End: 2020-08-28
Attending: SURGERY
Payer: COMMERCIAL

## 2020-08-28 ENCOUNTER — ANESTHESIA (OUTPATIENT)
Dept: PERIOP | Facility: HOSPITAL | Age: 40
End: 2020-08-28
Payer: COMMERCIAL

## 2020-08-28 ENCOUNTER — TELEPHONE (OUTPATIENT)
Dept: OTHER | Facility: OTHER | Age: 40
End: 2020-08-28

## 2020-08-28 ENCOUNTER — ANESTHESIA EVENT (OUTPATIENT)
Dept: PERIOP | Facility: HOSPITAL | Age: 40
End: 2020-08-28
Payer: COMMERCIAL

## 2020-08-28 ENCOUNTER — HOSPITAL ENCOUNTER (OUTPATIENT)
Facility: HOSPITAL | Age: 40
Setting detail: OUTPATIENT SURGERY
Discharge: HOME | End: 2020-08-28
Attending: SURGERY | Admitting: SURGERY
Payer: COMMERCIAL

## 2020-08-28 VITALS — HEIGHT: 66 IN | WEIGHT: 218 LBS | BODY MASS INDEX: 35.03 KG/M2

## 2020-08-28 VITALS
HEART RATE: 65 BPM | TEMPERATURE: 97.7 F | OXYGEN SATURATION: 97 % | RESPIRATION RATE: 16 BRPM | SYSTOLIC BLOOD PRESSURE: 140 MMHG | DIASTOLIC BLOOD PRESSURE: 78 MMHG

## 2020-08-28 DIAGNOSIS — D05.12 INTRADUCTAL CARCINOMA IN SITU OF LEFT BREAST: ICD-10-CM

## 2020-08-28 DIAGNOSIS — D05.12 DUCTAL CARCINOMA IN SITU (DCIS) OF LEFT BREAST: ICD-10-CM

## 2020-08-28 PROBLEM — K21.9 GASTROESOPHAGEAL REFLUX DISEASE: Status: ACTIVE | Noted: 2020-08-28

## 2020-08-28 LAB
EXT PREGNANCY TEST URINE: NEGATIVE
EXT. CONTROL: NORMAL

## 2020-08-28 PROCEDURE — 88307 TISSUE EXAM BY PATHOLOGIST: CPT | Performed by: PATHOLOGY

## 2020-08-28 PROCEDURE — 19301 PARTIAL MASTECTOMY: CPT | Performed by: SURGERY

## 2020-08-28 PROCEDURE — 81025 URINE PREGNANCY TEST: CPT | Performed by: STUDENT IN AN ORGANIZED HEALTH CARE EDUCATION/TRAINING PROGRAM

## 2020-08-28 PROCEDURE — 19301 PARTIAL MASTECTOMY: CPT | Performed by: PHYSICIAN ASSISTANT

## 2020-08-28 PROCEDURE — NC001 PR NO CHARGE: Performed by: PHYSICIAN ASSISTANT

## 2020-08-28 PROCEDURE — 19281 PERQ DEVICE BREAST 1ST IMAG: CPT

## 2020-08-28 RX ORDER — ONDANSETRON 2 MG/ML
4 INJECTION INTRAMUSCULAR; INTRAVENOUS ONCE AS NEEDED
Status: DISCONTINUED | OUTPATIENT
Start: 2020-08-28 | End: 2020-08-28 | Stop reason: HOSPADM

## 2020-08-28 RX ORDER — METOCLOPRAMIDE HYDROCHLORIDE 5 MG/ML
10 INJECTION INTRAMUSCULAR; INTRAVENOUS ONCE AS NEEDED
Status: COMPLETED | OUTPATIENT
Start: 2020-08-28 | End: 2020-08-28

## 2020-08-28 RX ORDER — FENTANYL CITRATE/PF 50 MCG/ML
25 SYRINGE (ML) INJECTION
Status: DISCONTINUED | OUTPATIENT
Start: 2020-08-28 | End: 2020-08-28 | Stop reason: HOSPADM

## 2020-08-28 RX ORDER — FENTANYL CITRATE 50 UG/ML
INJECTION, SOLUTION INTRAMUSCULAR; INTRAVENOUS AS NEEDED
Status: DISCONTINUED | OUTPATIENT
Start: 2020-08-28 | End: 2020-08-28

## 2020-08-28 RX ORDER — CEFAZOLIN SODIUM 2 G/50ML
SOLUTION INTRAVENOUS AS NEEDED
Status: DISCONTINUED | OUTPATIENT
Start: 2020-08-28 | End: 2020-08-28

## 2020-08-28 RX ORDER — LIDOCAINE HYDROCHLORIDE 10 MG/ML
INJECTION, SOLUTION EPIDURAL; INFILTRATION; INTRACAUDAL; PERINEURAL AS NEEDED
Status: DISCONTINUED | OUTPATIENT
Start: 2020-08-28 | End: 2020-08-28

## 2020-08-28 RX ORDER — DEXAMETHASONE SODIUM PHOSPHATE 10 MG/ML
INJECTION, SOLUTION INTRAMUSCULAR; INTRAVENOUS AS NEEDED
Status: DISCONTINUED | OUTPATIENT
Start: 2020-08-28 | End: 2020-08-28

## 2020-08-28 RX ORDER — BUPIVACAINE HYDROCHLORIDE AND EPINEPHRINE 2.5; 5 MG/ML; UG/ML
INJECTION, SOLUTION INFILTRATION; PERINEURAL AS NEEDED
Status: DISCONTINUED | OUTPATIENT
Start: 2020-08-28 | End: 2020-08-28 | Stop reason: HOSPADM

## 2020-08-28 RX ORDER — OXYCODONE HYDROCHLORIDE AND ACETAMINOPHEN 5; 325 MG/1; MG/1
1 TABLET ORAL EVERY 4 HOURS PRN
Status: DISCONTINUED | OUTPATIENT
Start: 2020-08-28 | End: 2020-08-28 | Stop reason: HOSPADM

## 2020-08-28 RX ORDER — MIDAZOLAM HYDROCHLORIDE 2 MG/2ML
INJECTION, SOLUTION INTRAMUSCULAR; INTRAVENOUS AS NEEDED
Status: DISCONTINUED | OUTPATIENT
Start: 2020-08-28 | End: 2020-08-28

## 2020-08-28 RX ORDER — PROPOFOL 10 MG/ML
INJECTION, EMULSION INTRAVENOUS AS NEEDED
Status: DISCONTINUED | OUTPATIENT
Start: 2020-08-28 | End: 2020-08-28

## 2020-08-28 RX ORDER — PROMETHAZINE HYDROCHLORIDE 25 MG/ML
12.5 INJECTION, SOLUTION INTRAMUSCULAR; INTRAVENOUS ONCE AS NEEDED
Status: DISCONTINUED | OUTPATIENT
Start: 2020-08-28 | End: 2020-08-28 | Stop reason: HOSPADM

## 2020-08-28 RX ORDER — CEFAZOLIN SODIUM 2 G/50ML
2000 SOLUTION INTRAVENOUS ONCE
Status: CANCELLED | OUTPATIENT
Start: 2020-08-28

## 2020-08-28 RX ORDER — ONDANSETRON 2 MG/ML
INJECTION INTRAMUSCULAR; INTRAVENOUS AS NEEDED
Status: DISCONTINUED | OUTPATIENT
Start: 2020-08-28 | End: 2020-08-28

## 2020-08-28 RX ORDER — HYDROMORPHONE HCL/PF 1 MG/ML
0.2 SYRINGE (ML) INJECTION
Status: DISCONTINUED | OUTPATIENT
Start: 2020-08-28 | End: 2020-08-28 | Stop reason: HOSPADM

## 2020-08-28 RX ORDER — SODIUM CHLORIDE, SODIUM LACTATE, POTASSIUM CHLORIDE, CALCIUM CHLORIDE 600; 310; 30; 20 MG/100ML; MG/100ML; MG/100ML; MG/100ML
INJECTION, SOLUTION INTRAVENOUS CONTINUOUS PRN
Status: DISCONTINUED | OUTPATIENT
Start: 2020-08-28 | End: 2020-08-28

## 2020-08-28 RX ADMIN — PROPOFOL 200 MG: 10 INJECTION, EMULSION INTRAVENOUS at 10:38

## 2020-08-28 RX ADMIN — LIDOCAINE HYDROCHLORIDE 50 MG: 10 INJECTION, SOLUTION EPIDURAL; INFILTRATION; INTRACAUDAL; PERINEURAL at 10:38

## 2020-08-28 RX ADMIN — ONDANSETRON 4 MG: 2 INJECTION INTRAMUSCULAR; INTRAVENOUS at 10:45

## 2020-08-28 RX ADMIN — DEXAMETHASONE SODIUM PHOSPHATE 4 MG: 10 INJECTION, SOLUTION INTRAMUSCULAR; INTRAVENOUS at 10:45

## 2020-08-28 RX ADMIN — FENTANYL CITRATE 50 MCG: 50 INJECTION INTRAMUSCULAR; INTRAVENOUS at 10:45

## 2020-08-28 RX ADMIN — SODIUM CHLORIDE, SODIUM LACTATE, POTASSIUM CHLORIDE, AND CALCIUM CHLORIDE: .6; .31; .03; .02 INJECTION, SOLUTION INTRAVENOUS at 10:33

## 2020-08-28 RX ADMIN — MIDAZOLAM HYDROCHLORIDE 2 MG: 1 INJECTION, SOLUTION INTRAMUSCULAR; INTRAVENOUS at 10:29

## 2020-08-28 RX ADMIN — FENTANYL CITRATE 50 MCG: 50 INJECTION INTRAMUSCULAR; INTRAVENOUS at 11:08

## 2020-08-28 RX ADMIN — METOCLOPRAMIDE HYDROCHLORIDE 10 MG: 5 INJECTION INTRAMUSCULAR; INTRAVENOUS at 11:58

## 2020-08-28 RX ADMIN — ONDANSETRON 4 MG: 2 INJECTION INTRAMUSCULAR; INTRAVENOUS at 11:44

## 2020-08-28 RX ADMIN — CEFAZOLIN SODIUM 2000 MG: 2 SOLUTION INTRAVENOUS at 10:49

## 2020-08-28 NOTE — TELEPHONE ENCOUNTER
Desirae Coles/ DELPHINE [id number] [phone]/ The pt had a lumpectomy completed today, 20 by Dr Delroy Blackburn and the pt's incision is bleeding through the strip and all over her clothes

## 2020-08-28 NOTE — ANESTHESIA POSTPROCEDURE EVALUATION
Post-Op Assessment Note    CV Status:  Stable  Pain Score: 0    Pain management: adequate     Mental Status:  Alert and awake   Hydration Status:  Euvolemic   PONV Controlled:  Controlled   Airway Patency:  Patent      Post Op Vitals Reviewed: Yes      Staff: Anesthesiologist         No complications documented      BP   139/68   Temp  97 9   Pulse  89   Resp      SpO2   97

## 2020-08-28 NOTE — INTERVAL H&P NOTE
H&P reviewed  After examining the patient I find no changes in the patients condition since the H&P had been written      Vitals:    08/28/20 0838   BP: 131/67   Pulse: 62   Resp: 20   Temp: (!) 96 5 °F (35 8 °C)   SpO2: 96%

## 2020-08-28 NOTE — DISCHARGE INSTRUCTIONS
POST-OPERATIVE BREAST CARE INSTRUCTIONS    Wound Closure/Dressing: Your wound is closed with:   Steri strips-white pieces of tape that hold your incision together along with surgical glue           Dissolvable sutures-underneath the skin    Wound care:     If you have gauze over your wound you may remove it the day after surgery  Leave the Steri-strips on, they will fall off on their own in 1-2 weeks   You may shower using soap and water to clean your wound  Gently pat dry  Drain Care: If you do not have a drain, disregard this section   Visiting Nursing should have been arranged upon discharge from hospital   A nurse will call your home to schedule an initial visit  Please call the number below if you have not received a call within 48 hours of hospital discharge   You may shower with the VELIA drains  Wash area around the drains with soap and water and pat dry   Record drain outputs on chart given to you at pre op visit and bring that chart to office at post op appt   VELIA drains can be removed in the office by a nurse either at post op visit OR when drain output is 30 ccs or less in a 24 hour period for three days in a row   If you had plastic surgery or reconstructive surgery, the plastic surgeon will manage the drains  Other:       You can begin wearing your own bra when it feels comfortable   You may resume using deodorant the day after surgery                 Post-op visit:  your post-op visit is scheduled for:  2020 @ 12:00 Noon    Call our office at [phone] if you have any  of the followin  Redness, swelling, heat, unusual drainage or heavy bleeding from wound  2  Fever greater than 101 °  3  Pain not relieved by medication

## 2020-08-28 NOTE — ANESTHESIA PREPROCEDURE EVALUATION
Procedure:  BREAST NEEDLE LOCALIZED LUMPECTOMY (NEEDLE LOC AT 0900) (Left Breast)    Relevant Problems   ANESTHESIA (within normal limits)   (-) History of anesthesia complications      CARDIO   (+) Pure hypercholesterolemia      ENDO (within normal limits)      GI/HEPATIC   (+) Gastroesophageal reflux disease (well controlled)      /RENAL (within normal limits)      HEMATOLOGY (within normal limits)      MUSCULOSKELETAL (within normal limits)      NEURO/PSYCH   (+) Anxiety      PULMONARY   (-) Shortness of breath   (-) Smoking   (-) URI (upper respiratory infection)      Other   (+) Class 1 obesity due to excess calories without serious comorbidity with body mass index (BMI) of 33 0 to 33 9 in adult        Physical Exam    Airway    Mallampati score: II  TM Distance: >3 FB  Neck ROM: full     Dental   upper dentures,     Cardiovascular      Pulmonary      Other Findings        Anesthesia Plan  ASA Score- 2     Anesthesia Type- general with ASA Monitors  Additional Monitors:   Airway Plan: LMA  Plan Factors-Exercise tolerance (METS): >4 METS  Chart reviewed  EKG reviewed  Existing labs reviewed  Patient is not a current smoker  Induction- intravenous  Postoperative Plan-     Informed Consent- Anesthetic plan and risks discussed with patient  I personally reviewed this patient with the CRNA  Discussed and agreed on the Anesthesia Plan with the CRNA  Alba Herr

## 2020-08-28 NOTE — OP NOTE
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]    Procedure(s) (LRB):  BREAST NEEDLE LOCALIZED LUMPECTOMY (NEEDLE LOC AT 0900) (Left)    Specimen(s):  ID Type Source Tests Collected by Time Destination   1 : left breast lumpectomy  new margin posterior black Tissue Soft Tissue, Other TISSUE EXAM Jaelyn Borrero MD 2020 10:59 AM    2 : left breast lumpectomy new margins inferior blue Tissue Breast, Left TISSUE EXAM Jaelyn Borrero MD 2020 11:03 AM    3 : left breast lumpectomy new margin lateral red Tissue Breast, Left TISSUE EXAM Jaelyn Borrero MD 2020 11:04 AM    4 : left breast lumpectomy new margin medial yellow Tissue Breast, Left TISSUE EXAM Jaelyn Borrero MD 2020 11:05 AM    5 : left breast lumpectomy new margin superior orange Tissue Breast, Left TISSUE EXAM Jaelyn Borrero MD 2020 11:06 AM    6 : left breast lumpectomy new margin anterior green Tissue Breast, Left TISSUE Alaina Hurd MD 2020 11:07 AM    7 : left breast lumpectomy Tissue Breast, Left TISSUE EXAM Jaelyn Borrero MD 2020 11:07 AM        Estimated Blood Loss:   Minimal    Drains:  * No LDAs found *    Anesthesia Type:   General    Operative Indications:  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]      Operative Findings:  Good specimen radiograph    Complications:   None    Procedure and Technique:  I previously reviewed the needle localization images  Lumpectomy  The patient was brought to the operation room and placed under general anesthesia  Attention was paid to ensure appropriate padding and correct positioning  The left breast was prepped and draped in a sterile fashion    I initiated a time-out, identifying the patient, the correct side and the above procedure  All parties agreed with the time out  The planned incision was then injected with 0 25% Marcaine for local anesthesia  The incision was sharply incised  The localizing wire was used to guide the dissection  Superior, inferior, medial and lateral planes were developed around the localizing wire and the specimen truncated with electrocautery just beyond the tip of the wire  The specimen was then inked for orientation purposes  A specimen radiograph was taken in two dimensions  Additional margins were removed to optimize complete removal of the tumor  The additional margins were inked on the most distal area  All specimens were sent to pathology for permanent analysis  Superficial bleeding controlled with electrocautery and the wound was extensively irrigated  The wound was closed with two layers, an inner layer of 3-0 vicryl and a subcuticular layer of 4-0 monocryl  Steri-strips were applied  The counts were correct x 2  I was present for the entire procedure, the PA was used to facilitate retraction and expedite closure      Patient Disposition:  PACU     SIGNATURE: Pinky Dueñas MD  DATE: August 28, 2020  TIME: 11:08 AM

## 2020-08-30 ENCOUNTER — TELEPHONE (OUTPATIENT)
Dept: OTHER | Facility: OTHER | Age: 40
End: 2020-08-30

## 2020-08-30 NOTE — TELEPHONE ENCOUNTER
Yumiko Gone A Beil [birth year]  Had sx w/ Dr Misael Law on 8/28  Would like to speak with the on call bc she has a couple of questions regarding her lumpectomy   [phone]    Paged Dr Bautista Printers via [facility]

## 2020-08-31 ENCOUNTER — TELEPHONE (OUTPATIENT)
Dept: SURGICAL ONCOLOGY | Facility: CLINIC | Age: 40
End: 2020-08-31

## 2020-08-31 NOTE — TELEPHONE ENCOUNTER
Called patient to follow up with her calls to the on-call provider this weekend  Patient reports that on Friday, she came home from the hospital and took a nap, laying on her right side  States that when she woke up, there was a little bit of blood that had seeped through the bandage, but not enough to be "gushing" out, or even drip  Patient reports that when she talked to Dr Christina Krishnamurthy, he advised her to put ice over the site and the bleeding immediately stopped  Patient states that yesterday, she noticed a small black and blue area near her incision and was concerned, so called the on-call provider again  Patient reports that Dr Christina Krishnamurthy told her this was a normal finding after surgery  Patient denies tenderness, swelling, or redness at the incision site  Informed patient that a small amount of bruising is normal after surgery and instructed her to contact the office if any additional problems arise before her post-op appointment with Dr Zandra Cooks on 9/14  Patient verbalized understanding

## 2020-09-02 ENCOUNTER — OFFICE VISIT (OUTPATIENT)
Dept: DERMATOLOGY | Facility: CLINIC | Age: 40
End: 2020-09-02
Payer: COMMERCIAL

## 2020-09-02 VITALS — BODY MASS INDEX: 35.36 KG/M2 | TEMPERATURE: 98.5 F | HEIGHT: 66 IN | WEIGHT: 220 LBS

## 2020-09-02 DIAGNOSIS — D22.9 MULTIPLE MELANOCYTIC NEVI: ICD-10-CM

## 2020-09-02 DIAGNOSIS — L64.9 ANDROGENIC ALOPECIA: ICD-10-CM

## 2020-09-02 DIAGNOSIS — L81.4 LENTIGO: ICD-10-CM

## 2020-09-02 DIAGNOSIS — L82.0 SEBORRHEIC KERATOSES, INFLAMED: Primary | ICD-10-CM

## 2020-09-02 PROCEDURE — 99213 OFFICE O/P EST LOW 20 MIN: CPT | Performed by: DERMATOLOGY

## 2020-09-02 PROCEDURE — 17110 DESTRUCTION B9 LES UP TO 14: CPT | Performed by: DERMATOLOGY

## 2020-09-11 ENCOUNTER — TELEPHONE (OUTPATIENT)
Dept: HEMATOLOGY ONCOLOGY | Facility: CLINIC | Age: 40
End: 2020-09-11

## 2020-09-14 ENCOUNTER — OFFICE VISIT (OUTPATIENT)
Dept: SURGICAL ONCOLOGY | Facility: CLINIC | Age: 40
End: 2020-09-14
Payer: COMMERCIAL

## 2020-09-14 VITALS
TEMPERATURE: 98.4 F | BODY MASS INDEX: 35.52 KG/M2 | HEART RATE: 74 BPM | SYSTOLIC BLOOD PRESSURE: 130 MMHG | DIASTOLIC BLOOD PRESSURE: 78 MMHG | HEIGHT: 66 IN | RESPIRATION RATE: 18 BRPM | WEIGHT: 221 LBS

## 2020-09-14 DIAGNOSIS — Z98.890 STATUS POST LEFT BREAST LUMPECTOMY: ICD-10-CM

## 2020-09-14 DIAGNOSIS — Z71.89 OTHER SPECIFIED COUNSELING: Primary | ICD-10-CM

## 2020-09-14 DIAGNOSIS — D05.12 DUCTAL CARCINOMA IN SITU (DCIS) OF LEFT BREAST: ICD-10-CM

## 2020-09-14 PROCEDURE — 99213 OFFICE O/P EST LOW 20 MIN: CPT | Performed by: SURGERY

## 2020-09-14 PROCEDURE — 1036F TOBACCO NON-USER: CPT | Performed by: SURGERY

## 2020-09-14 NOTE — PROGRESS NOTES
Surgical Oncology Breast Post-Op       [address] [facility]  [facility] [place]  [address] [facility] [address]  [facility] [address]    Jerardo Josseline  1980  [id number]  [address] [facility]  [facility] [place]  [address] [facility] [address]    Chief Complaint:   Linda Mccrary is seen for a post-operative visit of her recent breast surgery  Oncology History:     Oncology History   Ductal carcinoma in situ (DCIS) of left breast   7/20/2020 Biopsy    Left breast stereotactic biopsy  11 o'clock, anterior depth  Ductal carcinoma in situ  Grade 2        Concordant  Malignancy appears unifocal  Calcifications spanned 5 mm  Right breast clear  7/24/2020 Genetic Testing    The following genes were evaluated: AARON, BRCA1, BRCA2, CDH1, CHEK2, PALB2, PTEN, STK11, TP53  Additional genes were analyzed for a total of 20 genes  Negative result  No pathogenic sequence variants or deletions/dupllications identified  [employer]     8/28/2020 Surgery    Left breast needle localized lumpectomy  Ductal carcinoma in situ  Grade 2  Size cannot be determined (5/34 blocks)  Margins negative  Stage 0         Assessment & Plan:   Assessment/Plan    Patient presents for postoperative visit  I recommended she see Dr Jame Aguilera for educational session regarding anti hormonal therapy  History of Present Illness:   See Oncology History    Interval History:   See Assessment & Plan, the patient states she had a small amount of bleeding postoperatively though this resolved quite quickly  Review of Systems:   Review of Systems   Constitutional: Negative for activity change, appetite change and fatigue  HENT: Negative  Eyes: Negative  Respiratory: Negative for cough, shortness of breath and wheezing  Cardiovascular: Negative for chest pain and leg swelling  Gastrointestinal: Negative  Endocrine: Negative  Genitourinary: Negative  Musculoskeletal:        No new changes or complaints of bone pain   Skin: Negative  Allergic/Immunologic: Negative  Neurological: Negative  Hematological: Negative  Psychiatric/Behavioral: Negative  All other systems reviewed and are negative  Past Medical History:     Patient Active Problem List   Diagnosis    Anxiety    Ductal carcinoma in situ (DCIS) of left breast    Class 1 obesity due to excess calories without serious comorbidity with body mass index (BMI) of 33 0 to 33 9 in adult    Pure hypercholesterolemia    Gastroesophageal reflux disease     Past Medical History:   Diagnosis Date    Abnormal Pap smear of cervix     Anxiety 01/2015    Last assessed    BRCA gene mutation negative 07/24/2020    [facility] 20 gene panel    Breast cancer ([facility] [address] [facility]     HPV (human papilloma virus) infection     unsure     Past Surgical History:   Procedure Laterality Date    BREAST LUMPECTOMY Left 8/28/2020    Procedure: BREAST NEEDLE LOCALIZED LUMPECTOMY (NEEDLE LOC AT 0900);   Surgeon: Dorinda Lagunas MD;  [facility];  Service: Surgical Oncology    CHOLECYSTECTOMY      CHOLECYSTECTOMY LAPAROSCOPIC  01/2015    Last assessed    COLPOSCOPY      MAMMO NEEDLE LOCALIZATION LEFT (ALL INC) Left 8/28/2020    MAMMO STEREOTACTIC BREAST BIOPSY LEFT (ALL INC) Left 7/20/2020    MOUTH SURGERY       Family History   Problem Relation Age of Onset    Heart attack Father         Acute    Heart failure Father     Coronary artery disease Father     Diabetes Father         DM    Hypertension Father     No Known Problems Maternal Grandmother     Alzheimer's disease Maternal Grandfather     Stroke Paternal Grandmother     Prostate cancer Paternal Grandfather 76    No Known Problems Mother     No Known Problems Son     No Known Problems Sister     No Known Problems Half-Sister     No Known Problems Half-Sister     No Known Problems Brother     No Known Problems Maternal Uncle     No Known Problems Maternal Uncle     No Known Problems Maternal Aunt     No Known Problems Maternal Aunt     No Known Problems Maternal Aunt     No Known Problems Paternal Uncle     No Known Problems Paternal Uncle      Social History     Socioeconomic History    Marital status: Single     Spouse name: Not on file    Number of children: Not on file    Years of education: Not on file    Highest education level: Not on file   Occupational History    Not on file   Social Needs    Financial resource strain: Not hard at all   Osmani-Miguel insecurity     Worry: Never true     Inability: Never true   [employer] needs     Medical: No     Non-medical: No   Tobacco Use    Smoking status: Former Smoker     Packs/day: 1 00     Types: Cigarettes     Last attempt to quit: 2019     Years since quittin 4    Smokeless tobacco: Never Used   Substance and Sexual Activity    Alcohol use: Yes     Alcohol/week: 2 0 standard drinks     Types: 2 Standard drinks or equivalent per week     Frequency: Monthly or less     Drinks per session: 1 or 2     Binge frequency: Never    Drug use: Not Currently     Types: Marijuana    Sexual activity: Not Currently     Partners: Male     Birth control/protection: I U D  Lifestyle    Physical activity     Days per week: 0 days     Minutes per session: 0 min    Stress: To some extent   Relationships    Social connections     Talks on phone: More than three times a week     Gets together:  Three times a week     Attends Alevism service: Never     Active member of club or organization: No     Attends meetings of clubs or organizations: Never     Relationship status: Never     Intimate partner violence     Fear of current or ex partner: No     Emotionally abused: No     Physically abused: No     Forced sexual activity: No   Other Topics Concern    Not on file   Social History Narrative    Caffeine use, Does not exercise, Full-time work, One child, Single,        Current Outpatient Medications:     Biotin 5000 MCG CAPS, Take by mouth, Disp: , Rfl:     Blood Pressure Monitoring (BLOOD PRESSURE MONITOR/M CUFF) MISC, by Does not apply route daily, Disp: 1 each, Rfl: 0    cetirizine (ZyrTEC) 10 mg tablet, Take 10 mg by mouth daily, Disp: , Rfl:     DAILY MULTIPLE VITAMINS/IRON TABS, Take by mouth, Disp: , Rfl:     oxyCODONE-acetaminophen (PERCOCET) 5-325 mg per tablet, Take 1 tablet by mouth every 6 (six) hours as needed for moderate painMax Daily Amount: 4 tablets, Disp: 6 tablet, Rfl: 0    Probiotic Product (ACIDOPHILUS PROBIOTIC BLEND) CAPS, Take by mouth, Disp: , Rfl:   No Known Allergies    Physical Exams:     Vitals:    09/14/20 1154   BP: 130/78   Pulse: 74   Resp: 18   Temp: 98 4 °F (36 9 °C)     Physical Exam  Chest:          Comments: Patient has a well-healed left periareolar incision  There is no evidence of hematoma and or infection  Results & Discussion:   The patient and I had a long conversation I reviewed her pathology with her  We talked about the pros and cons of radiation therapy  We ultimately decided to order the prelude test to help educate us regarding her risk  The pros are that radiation will reduce her risk a cons being that if she does develop a 2nd recurrence and has radiation therapy she would need a mastectomy  We will also have her see Dr Braulio Mckeon for an opinion regarding adjuvant hormonal therapy  We will see her back in 3 months and will contact her as soon as her prelude test returns  I have spent 20 minutes with Patient  today in which greater than 50% of this time was spent in counseling/coordination of care regarding her pathology but more importantly multiple questions regarding adjuvant therapies including radiation therapy as well as anti hormonal therapy  We also talked about side effects of hot flashes, her IUD is been removed    We ultimately decided to order a prelude test to help delineate the benefit of radiation therapy in her specific case

## 2020-09-16 ENCOUNTER — SOCIAL WORK (OUTPATIENT)
Dept: INFUSION CENTER | Facility: HOSPITAL | Age: 40
End: 2020-09-16

## 2020-09-16 DIAGNOSIS — D75.839 THROMBOCYTOSIS: Primary | ICD-10-CM

## 2020-09-16 NOTE — PROGRESS NOTES
Oncology SW Care Manager received completed Distress Thermometer  Ms Sumeet Torres self scored a 0/10 indicating there are no significant stressors at this time  Based on pt's score, a SW follow up would not be indicated at this time  If pt expresses psychosocial needs, an oncology social work care manager is available to provide support

## 2020-09-17 DIAGNOSIS — D75.839 THROMBOCYTOSIS: Primary | ICD-10-CM

## 2020-09-21 ENCOUNTER — APPOINTMENT (OUTPATIENT)
Dept: LAB | Age: 40
End: 2020-09-21
Payer: COMMERCIAL

## 2020-09-21 DIAGNOSIS — D75.839 THROMBOCYTOSIS: ICD-10-CM

## 2020-09-21 LAB
APTT PPP: 30 SECONDS (ref 23–37)
APTT PPP: 30 SECONDS (ref 23–37)
FIBRINOGEN PPP-MCNC: 302 MG/DL (ref 227–495)
INR PPP: 0.97 (ref 0.84–1.19)
PLATELET # BLD AUTO: 453 THOUSANDS/UL (ref 149–390)
PMV BLD AUTO: 9.7 FL (ref 8.9–12.7)
PROTHROMBIN TIME: 12.9 SECONDS (ref 11.6–14.5)
PROTHROMBIN TIME: 12.9 SECONDS (ref 11.6–14.5)
THROMBIN TIME: 15.6 SECONDS (ref 14.7–18.4)
THROMBIN TIME: 15.6 SECONDS (ref 14.7–18.4)

## 2020-09-21 PROCEDURE — 85611 PROTHROMBIN TEST: CPT

## 2020-09-21 PROCEDURE — 36415 COLL VENOUS BLD VENIPUNCTURE: CPT

## 2020-09-21 PROCEDURE — 85049 AUTOMATED PLATELET COUNT: CPT

## 2020-09-21 PROCEDURE — 85730 THROMBOPLASTIN TIME PARTIAL: CPT

## 2020-09-21 PROCEDURE — 85384 FIBRINOGEN ACTIVITY: CPT

## 2020-09-21 PROCEDURE — 85610 PROTHROMBIN TIME: CPT

## 2020-09-21 PROCEDURE — 85670 THROMBIN TIME PLASMA: CPT

## 2020-09-21 PROCEDURE — 85732 THROMBOPLASTIN TIME PARTIAL: CPT

## 2020-09-23 DIAGNOSIS — D75.839 THROMBOCYTOSIS: Primary | ICD-10-CM

## 2020-09-24 ENCOUNTER — TELEPHONE (OUTPATIENT)
Dept: HEMATOLOGY ONCOLOGY | Facility: CLINIC | Age: 40
End: 2020-09-24

## 2020-09-24 NOTE — TELEPHONE ENCOUNTER
Spoke to pt regarding referral to hemotology  She is already scheduled with Dr Shaina Null on 10/29 and will discuss with him then

## 2020-09-25 LAB — SCAN RESULT: NORMAL

## 2020-09-28 ENCOUNTER — TELEPHONE (OUTPATIENT)
Dept: SURGICAL ONCOLOGY | Facility: CLINIC | Age: 40
End: 2020-09-28

## 2020-09-28 NOTE — TELEPHONE ENCOUNTER
I spoke with the patient explained she had a 7% chance of having a cancer come back in the next 10 years with or without radiation therapy and it was a 3% chance with or without radiation therapy for an invasive cancer  We both agreed that radiation therapy was not going to provide her any significant advantage based on this test which is seems to be very reliable  She has a follow-up appoint with Dr Rosana Rodriguez  We also talked about her elevated platelet count which is been chronic her brother also has this  She will discuss this with Dr Rosana Rodriguez at her visit  All questions were answered the patient's satisfaction

## 2020-10-26 ENCOUNTER — TELEPHONE (OUTPATIENT)
Dept: HEMATOLOGY ONCOLOGY | Facility: CLINIC | Age: 40
End: 2020-10-26

## 2020-10-27 ENCOUNTER — CONSULT (OUTPATIENT)
Dept: HEMATOLOGY ONCOLOGY | Facility: CLINIC | Age: 40
End: 2020-10-27
Payer: COMMERCIAL

## 2020-10-27 VITALS
BODY MASS INDEX: 36.16 KG/M2 | HEIGHT: 66 IN | TEMPERATURE: 96.7 F | RESPIRATION RATE: 18 BRPM | SYSTOLIC BLOOD PRESSURE: 124 MMHG | OXYGEN SATURATION: 97 % | DIASTOLIC BLOOD PRESSURE: 78 MMHG | HEART RATE: 70 BPM | WEIGHT: 225 LBS

## 2020-10-27 DIAGNOSIS — D05.12 DUCTAL CARCINOMA IN SITU (DCIS) OF LEFT BREAST: ICD-10-CM

## 2020-10-27 PROCEDURE — 99244 OFF/OP CNSLTJ NEW/EST MOD 40: CPT | Performed by: INTERNAL MEDICINE

## 2020-10-27 PROCEDURE — 3008F BODY MASS INDEX DOCD: CPT | Performed by: INTERNAL MEDICINE

## 2020-11-13 ENCOUNTER — TELEPHONE (OUTPATIENT)
Dept: OBGYN CLINIC | Facility: CLINIC | Age: 40
End: 2020-11-13

## 2020-11-27 ENCOUNTER — TELEPHONE (OUTPATIENT)
Dept: OBGYN CLINIC | Facility: CLINIC | Age: 40
End: 2020-11-27

## 2020-12-14 PROBLEM — Z86.000 HISTORY OF DUCTAL CARCINOMA IN SITU (DCIS) OF BREAST: Status: ACTIVE | Noted: 2020-07-22

## 2021-03-01 ENCOUNTER — TELEPHONE (OUTPATIENT)
Dept: SURGICAL ONCOLOGY | Facility: CLINIC | Age: 41
End: 2021-03-01

## 2021-03-03 ENCOUNTER — OFFICE VISIT (OUTPATIENT)
Dept: SURGICAL ONCOLOGY | Facility: CLINIC | Age: 41
End: 2021-03-03
Payer: COMMERCIAL

## 2021-03-03 VITALS
HEART RATE: 82 BPM | RESPIRATION RATE: 18 BRPM | TEMPERATURE: 98 F | SYSTOLIC BLOOD PRESSURE: 162 MMHG | WEIGHT: 234 LBS | HEIGHT: 66 IN | DIASTOLIC BLOOD PRESSURE: 100 MMHG | BODY MASS INDEX: 37.61 KG/M2

## 2021-03-03 DIAGNOSIS — Z86.000 HISTORY OF DUCTAL CARCINOMA IN SITU (DCIS) OF BREAST: Primary | ICD-10-CM

## 2021-03-03 DIAGNOSIS — Z08 ENCOUNTER FOR FOLLOW-UP SURVEILLANCE OF BREAST CANCER: ICD-10-CM

## 2021-03-03 DIAGNOSIS — Z85.3 ENCOUNTER FOR FOLLOW-UP SURVEILLANCE OF BREAST CANCER: ICD-10-CM

## 2021-03-03 PROCEDURE — 3008F BODY MASS INDEX DOCD: CPT | Performed by: SURGERY

## 2021-03-03 PROCEDURE — 99213 OFFICE O/P EST LOW 20 MIN: CPT | Performed by: SURGERY

## 2021-03-03 PROCEDURE — 1036F TOBACCO NON-USER: CPT | Performed by: SURGERY

## 2021-03-03 RX ORDER — CHLORAL HYDRATE 500 MG
1000 CAPSULE ORAL DAILY
COMMUNITY

## 2021-03-03 NOTE — PROGRESS NOTES
Surgical Oncology Follow Up       [address]  [facility] [place]  [address]  [facility] [address]    Miller Coles  1980  [id number]  [address] [facility]  [facility] [place]  [address] [address]    Chief Complaint   Patient presents with    Follow-up     Pt is here for a three month follow up          Assessment & Plan:    the patient presents for a follow-up visit  She has no complaints referable to her breast other than occasional sharp spasms which come and go quickly  She has mammogram scheduled for June 2021  She has a normal clinical breast exam   She will see our advanced practitioner in 6 months  Cancer History:     Oncology History   History of ductal carcinoma in situ (DCIS) of breast   7/20/2020 Biopsy    Left breast stereotactic biopsy  11 o'clock, anterior depth  Ductal carcinoma in situ  Grade 2        Concordant  Malignancy appears unifocal  Calcifications spanned 5 mm  Right breast clear  7/24/2020 Genetic Testing    The following genes were evaluated: AARON, BRCA1, BRCA2, CDH1, CHEK2, PALB2, PTEN, STK11, TP53  Additional genes were analyzed for a total of 20 genes  Negative result  No pathogenic sequence variants or deletions/dupllications identified  [facility]     8/28/2020 Surgery    Left breast needle localized lumpectomy  Ductal carcinoma in situ  Grade 2  Size cannot be determined (5/34 blocks)  Margins negative  Stage 0     9/14/2020 Genomic Testing    DCISion RT low risk  10 year total risk with breast conserving surgery alone 7%  10 year total risk with breast conserving surgery and radiation 7%     10/27/2020 - 10/27/2020 Hormone Therapy    Consult with Dr Alex Hinton Tamoxifen           Interval History:     The patient presents for  A three-month follow-up visit    She complains of some sharp spasms  Which come unexpectedly and suddenly disappeared as well  I explained this is common  She has no other complaints referable to her breast     Review of Systems:   Review of Systems   Constitutional: Negative for activity change, appetite change, chills, fatigue and fever  HENT: Negative  Negative for ear pain and sore throat  Eyes: Negative  Negative for pain and visual disturbance  Respiratory: Negative for cough, shortness of breath and wheezing  Cardiovascular: Negative for chest pain, palpitations and leg swelling  Gastrointestinal: Negative  Negative for abdominal pain and vomiting  Endocrine: Negative  Genitourinary: Negative  Negative for dysuria and hematuria  Musculoskeletal: Negative for arthralgias and back pain  No new changes or complaints of bone pain   Skin: Negative  Negative for color change and rash  Allergic/Immunologic: Negative  Neurological: Negative  Negative for seizures and syncope  Hematological: Negative  Psychiatric/Behavioral: Negative  All other systems reviewed and are negative  Past Medical History     Patient Active Problem List   Diagnosis    Anxiety    History of ductal carcinoma in situ (DCIS) of breast    Class 1 obesity due to excess calories without serious comorbidity with body mass index (BMI) of 33 0 to 33 9 in adult    Pure hypercholesterolemia    Gastroesophageal reflux disease     Past Medical History:   Diagnosis Date    Abnormal Pap smear of cervix     Anxiety 01/2015    Last assessed    BRCA gene mutation negative 07/24/2020    [facility] 20 gene panel    [facility]     HPV (human papilloma virus) infection     unsure     Past Surgical History:   Procedure Laterality Date    BREAST LUMPECTOMY Left 8/28/2020    Procedure: BREAST NEEDLE LOCALIZED LUMPECTOMY (NEEDLE LOC AT 0900);   Surgeon: Tito Pascal MD;  [facility];  Service: Surgical Oncology    CHOLECYSTECTOMY      CHOLECYSTECTOMY LAPAROSCOPIC  01/2015    Last assessed    COLPOSCOPY  MAMMO NEEDLE LOCALIZATION LEFT (ALL INC) Left 2020    MAMMO STEREOTACTIC BREAST BIOPSY LEFT (ALL INC) Left 2020    MOUTH SURGERY       Family History   Problem Relation Age of Onset    Heart attack Father         Acute    Heart failure Father     Coronary artery disease Father     Diabetes Father         DM    Hypertension Father     No Known Problems Maternal Grandmother     Alzheimer's disease Maternal Grandfather     Stroke Paternal Grandmother     Prostate cancer Paternal Grandfather 76    No Known Problems Mother     No Known Problems Son     No Known Problems Sister     No Known Problems Half-Sister     No Known Problems Half-Sister     No Known Problems Brother     No Known Problems Maternal Uncle     No Known Problems Maternal Uncle     No Known Problems Maternal Aunt     No Known Problems Maternal Aunt     No Known Problems Maternal Aunt     No Known Problems Paternal Uncle     No Known Problems Paternal Uncle      Social History     Socioeconomic History    Marital status: Single     Spouse name: Not on file    Number of children: Not on file    Years of education: Not on file    Highest education level: Not on file   Occupational History    Not on file   Social Needs    Financial resource strain: Not hard at all   Osmani-Miguel insecurity     Worry: Never true     Inability: Never true    Transportation needs     Medical: No     Non-medical: No   Tobacco Use    Smoking status: Former Smoker     Packs/day: 1 00     Types: Cigarettes     Quit date: 2019     Years since quittin 9    Smokeless tobacco: Never Used   Substance and Sexual Activity    Alcohol use:  Yes     Alcohol/week: 2 0 standard drinks     Types: 2 Standard drinks or equivalent per week     Frequency: Monthly or less     Drinks per session: 1 or 2     Binge frequency: Never    Drug use: Not Currently     Types: Marijuana    Sexual activity: Not Currently     Partners: Male     Birth control/protection: I U D  Lifestyle    Physical activity     Days per week: 0 days     Minutes per session: 0 min    Stress: To some extent   Relationships    Social connections     Talks on phone: More than three times a week     Gets together: Three times a week     Attends Church service: Never     Active member of club or organization: No     Attends meetings of clubs or organizations: Never     Relationship status: Never     Intimate partner violence     Fear of current or ex partner: No     Emotionally abused: No     Physically abused: No     Forced sexual activity: No   Other Topics Concern    Not on file   Social History Narrative    Caffeine use, Does not exercise, Full-time work, One child, Single,        Current Outpatient Medications:     Biotin 5000 MCG CAPS, Take by mouth, Disp: , Rfl:     cetirizine (ZyrTEC) 10 mg tablet, Take 10 mg by mouth daily, Disp: , Rfl:     DAILY MULTIPLE VITAMINS/IRON TABS, Take by mouth, Disp: , Rfl:     Omega-3 Fatty Acids (fish oil) 1,000 mg, Take 1,000 mg by mouth daily, Disp: , Rfl:     Probiotic Product (ACIDOPHILUS PROBIOTIC BLEND) CAPS, Take by mouth, Disp: , Rfl:     Blood Pressure Monitoring (BLOOD PRESSURE MONITOR/M CUFF) MISC, by Does not apply route daily, Disp: 1 each, Rfl: 0    oxyCODONE-acetaminophen (PERCOCET) 5-325 mg per tablet, Take 1 tablet by mouth every 6 (six) hours as needed for moderate painMax Daily Amount: 4 tablets, Disp: 6 tablet, Rfl: 0  No Known Allergies    Physical Exam:     Vitals:    03/03/21 1042   BP: 162/100   Pulse: 82   Resp: 18   Temp: 98 °F (36 7 °C)     Physical Exam  Vitals signs reviewed  Constitutional:       Appearance: She is well-developed  HENT:      Head: Normocephalic and atraumatic  Eyes:      Pupils: Pupils are equal, round, and reactive to light  Neck:      Musculoskeletal: Normal range of motion and neck supple  Thyroid: No thyromegaly  Vascular: No JVD        Trachea: No tracheal deviation  Cardiovascular:      Rate and Rhythm: Normal rate and regular rhythm  Heart sounds: Normal heart sounds  No murmur  No friction rub  No gallop  Pulmonary:      Effort: Pulmonary effort is normal  No respiratory distress  Breath sounds: Normal breath sounds  No wheezing or rales  Chest:          Comments: The right breast was examined in the sitting and supine position  There are no worrisome skin changes, tenderness, inverted nipple, nipple discharge, swelling, bleeding or evidence of a mass in any quadrant  Shravan survey demonstrated no evidence of any clinically suspicious axillary, pectoral or paraclavicular lymph nodes  The left breast was examined in the sitting and supine position  There are no worrisome skin changes, tenderness, inverted nipple, nipple discharge, swelling, bleeding or evidence of a mass in any quadrant  Shravan survey demonstrated no evidence of any clinically suspicious axillary, pectoral or paraclavicular lymph nodes  Abdominal:      General: There is no distension  Palpations: Abdomen is soft  There is no hepatomegaly or mass  Tenderness: There is no abdominal tenderness  There is no guarding or rebound  Musculoskeletal: Normal range of motion  General: No tenderness  Lymphadenopathy:      Cervical: No cervical adenopathy  Skin:     General: Skin is warm and dry  Findings: No erythema or rash  Neurological:      Mental Status: She is alert and oriented to person, place, and time  Cranial Nerves: No cranial nerve deficit  Psychiatric:         Behavior: Behavior normal            Results & Discussion:     The patient is free of any evidence of local regional or distant recurrence disease  She has her mammograms coordinated for June  She will follow up with our advanced nurse practitioner Ru Smith            Advance Care Planning/Advance Directives:  I discussed the disease status, treatment plans and follow-up with the patient

## 2021-06-14 ENCOUNTER — HOSPITAL ENCOUNTER (OUTPATIENT)
Dept: RADIOLOGY | Age: 41
Discharge: HOME | End: 2021-06-14
Payer: COMMERCIAL

## 2021-06-14 ENCOUNTER — APPOINTMENT (OUTPATIENT)
Dept: LAB | Age: 41
End: 2021-06-14
Payer: COMMERCIAL

## 2021-06-14 ENCOUNTER — TRANSCRIBE ORDERS (OUTPATIENT)
Dept: ADMINISTRATIVE | Facility: HOSPITAL | Age: 41
End: 2021-06-14

## 2021-06-14 DIAGNOSIS — E78.00 PURE HYPERCHOLESTEROLEMIA: Chronic | ICD-10-CM

## 2021-06-14 DIAGNOSIS — C50.919 BREAST CANCER IN FEMALE (HCC): Primary | ICD-10-CM

## 2021-06-14 DIAGNOSIS — Z12.31 ENCOUNTER FOR SCREENING MAMMOGRAM FOR MALIGNANT NEOPLASM OF BREAST: ICD-10-CM

## 2021-06-14 LAB
ALBUMIN SERPL BCP-MCNC: 4.2 G/DL (ref 3.5–5)
ALP SERPL-CCNC: 94 U/L (ref 46–116)
ALT SERPL W P-5'-P-CCNC: 113 U/L (ref 12–78)
AST SERPL W P-5'-P-CCNC: 85 U/L (ref 5–45)
BILIRUB DIRECT SERPL-MCNC: 0.16 MG/DL (ref 0–0.2)
BILIRUB SERPL-MCNC: 0.5 MG/DL (ref 0.2–1)
CHOLEST SERPL-MCNC: 233 MG/DL (ref 50–200)
HDLC SERPL-MCNC: 85 MG/DL
LDLC SERPL CALC-MCNC: 133 MG/DL (ref 0–100)
PROT SERPL-MCNC: 8.2 G/DL (ref 6.4–8.2)
TRIGL SERPL-MCNC: 75 MG/DL

## 2021-06-14 PROCEDURE — 80061 LIPID PANEL: CPT

## 2021-06-14 PROCEDURE — 80076 HEPATIC FUNCTION PANEL: CPT

## 2021-06-14 PROCEDURE — 36415 COLL VENOUS BLD VENIPUNCTURE: CPT

## 2021-06-16 ENCOUNTER — OFFICE VISIT (OUTPATIENT)
Dept: INTERNAL MEDICINE CLINIC | Facility: CLINIC | Age: 41
End: 2021-06-16

## 2021-06-16 VITALS
WEIGHT: 244 LBS | TEMPERATURE: 97.7 F | HEIGHT: 66 IN | HEART RATE: 72 BPM | SYSTOLIC BLOOD PRESSURE: 156 MMHG | DIASTOLIC BLOOD PRESSURE: 95 MMHG | BODY MASS INDEX: 39.21 KG/M2 | OXYGEN SATURATION: 95 %

## 2021-06-16 DIAGNOSIS — R79.89 ELEVATED LFTS: ICD-10-CM

## 2021-06-16 DIAGNOSIS — R03.0 ELEVATED BP WITHOUT DIAGNOSIS OF HYPERTENSION: ICD-10-CM

## 2021-06-16 DIAGNOSIS — Z86.000 HISTORY OF DUCTAL CARCINOMA IN SITU (DCIS) OF BREAST: ICD-10-CM

## 2021-06-16 DIAGNOSIS — D75.839 THROMBOCYTOSIS: ICD-10-CM

## 2021-06-16 DIAGNOSIS — E66.09 CLASS 2 OBESITY DUE TO EXCESS CALORIES WITHOUT SERIOUS COMORBIDITY WITH BODY MASS INDEX (BMI) OF 39.0 TO 39.9 IN ADULT: ICD-10-CM

## 2021-06-16 DIAGNOSIS — R53.83 FEELING TIRED: ICD-10-CM

## 2021-06-16 DIAGNOSIS — Z00.00 VISIT FOR ANNUAL HEALTH EXAMINATION: ICD-10-CM

## 2021-06-16 DIAGNOSIS — E78.00 PURE HYPERCHOLESTEROLEMIA: Primary | Chronic | ICD-10-CM

## 2021-06-16 DIAGNOSIS — G56.03 CARPAL TUNNEL SYNDROME ON BOTH SIDES: ICD-10-CM

## 2021-06-16 PROBLEM — E66.812 CLASS 2 OBESITY DUE TO EXCESS CALORIES WITHOUT SERIOUS COMORBIDITY WITH BODY MASS INDEX (BMI) OF 39.0 TO 39.9 IN ADULT: Status: ACTIVE | Noted: 2020-07-30

## 2021-06-16 PROCEDURE — 3008F BODY MASS INDEX DOCD: CPT | Performed by: PHYSICIAN ASSISTANT

## 2021-06-16 PROCEDURE — 99396 PREV VISIT EST AGE 40-64: CPT | Performed by: PHYSICIAN ASSISTANT

## 2021-06-16 PROCEDURE — 1036F TOBACCO NON-USER: CPT | Performed by: PHYSICIAN ASSISTANT

## 2021-06-16 RX ORDER — ATORVASTATIN CALCIUM 20 MG/1
20 TABLET, FILM COATED ORAL DAILY
Qty: 90 TABLET | Refills: 0 | Status: SHIPPED | OUTPATIENT
Start: 2021-06-16 | End: 2021-09-20 | Stop reason: SDUPTHER

## 2021-06-16 NOTE — PROGRESS NOTES
Assessment/Plan: On your visit today we discussed a number of your medical concerns as well as lab results  As per discussion you report you made significant changes to your diet have been also taking omega-3 and eating fish but have had no improvement and in fact her cholesterol level has increased  You also confirm known family history with elevated cholesterol as well  After discussion you would like a trial of medication and atorvastatin 20 mg has been sent to her pharmacy 1 tablet daily in the evening  As per our discussion script provided for follow-up labs to re-evaluate liver and cholesterol levels in 3 months  We did review that no additional refills will be provided until follow-up labs are completed in 3 months  We did discuss that you did have a slight increase in liver function enzymes that were normal in the past   This will not affect her ability to start cholesterol medicine but I have not ordered the follow-up testing including ultrasound and screening for hepatitis  May be due to fatty liver and that is what the ultrasound will evaluate for  You are also feeling tired and feel like her hormones are off  Thyroid function in the past has been normal new testing ordered today  We did review however that you must stop taking the biotin for at least 2 weeks and you can get the labs completed June 30th or after  Referral also provided today to weight management to meet with dietitian and nutritionist to come up with a plan to help you with your goals of healthy meaningful weight loss  You are scheduled for your follow-up diagnostic mammogram June 30th and already have an appointment scheduled for the surgical oncologist after to review  The slight elevation in your platelets and white blood cell counts was addressed by the hematologist who agreed nothing significant does not require any additional evaluation but can complete annual CBC to monitor    This has been ordered to be completed with your labs as dated in 3 months  We also discussed that your having additional symptoms not only of carpal tunnel but of some tendinitis secondary to working as a  with repetitive fine hand movements  You do have the braces and will resume wearing them at bedtime and during the day if needed  If you have no improvement or progression of symptoms will provide referral to Ortho at that time  No problem-specific Assessment & Plan notes found for this encounter  Diagnoses and all orders for this visit:    Pure hypercholesterolemia  -     atorvastatin (LIPITOR) 20 mg tablet; Take 1 tablet (20 mg total) by mouth daily  -     Hepatic function panel; Future  -     Lipid Panel with Direct LDL reflex; Future    Class 2 obesity due to excess calories without serious comorbidity with body mass index (BMI) of 39 0 to 39 9 in adult  -     Ambulatory referral to Weight Management; Future    Elevated LFTs  -     US right upper quadrant; Future  -     Hepatitis C antibody; Future    Thrombocytosis (HCC)  -     CBC and differential; Future    Feeling tired  -     TSH, 3rd generation with Free T4 reflex    Carpal tunnel syndrome on both sides    Elevated BP without diagnosis of hypertension    Visit for annual health examination    History of ductal carcinoma in situ (DCIS) of breast          Subjective:      Patient ID: Milana Sinha is a 35 y o  female  Patient presents today for follow-up review of her labs  Also note patient's blood pressure has remain consistently elevated and will also need to discuss treatment  At last visit in July patient was given a script for blood pressure cuff to monitor and was to return her readings in a few weeks for evaluation  States at home range is 130 to 70's   Patient reports she has been checking it but not on a regular basis since then but admits she did not bring the readings in but does have them at home      Will defer starting medication but provide a new blood pressure log to be brought in at follow-up appointment in a few weeks  Patient was also advised on dietary modifications regarding elevated cholesterol and would recheck  Patient brought in today because labs do show that her cholesterol levels have actually increased  Patient states has made diet changes and taking fish oil but no change to cholesterol and also gaining weight  Patient also concerned about the change in her liver enzymes  They have been normal in the past   Patient is aware that this is a marker of inflammation not necessarily damage and can change from 1 set of labs to the next but with additional weight gain will also screen for fatty liver disease as well as hepatitis-C although patient states she does not have any risk factors for same  Patient also admits that she was taking lysine as she was developing fever blisters after her breast surgery and during the pandemic and then did read that it can affect liver enzymes  Patient will discontinue that medication  Also wondering if removal of IUD is causing hormone issues  Patient states since her IUD was removed her periods are regular and does not develop a lot of pain but she has noted significant increase in mood swings and can feel sad or angry very quickly  Reviewed with patient as her menstruation is completely normal does not mean that she is having pre or perimenopause at this time but will discuss with her gyn  Is UTD with Pap    Concerned about her weight as states has not lost any  Patient has struggled with weight loss most of her life and does admit that many people in her family are overweight as well and agreeable to referral to weight management for assistance in healthy meaningful weight loss      Positive FH high cholesterol as well,   Reviewed with patient that her ASCVD risk calculation is actually quite low at 0 6% but because patient is very concerned and known family history and no improvement with the addition of Omega threes and diet although she does admit to weight gain would like at least a trial of medication  Will get patient started on atorvastatin 20 mg 1 tablet daily in the evening aware labs will be re-evaluated in 3 months  As noted above patient does have slightly elevated liver enzymes but not enough to prevent cholesterol medications and will be monitored as noted  Patient was also asking about doing a "cleansing" treatment advised patient not to do it as we have a specific balance within our intestines and taking any of those other supplements not only could potentially affect her liver even more but throws off the balance  Patient will continue her probiotic  Patient has been taking biotin as well and because we do need to re-evaluate her thyroid functions secondary to weight gain will need to be off the biotin for at least 2 weeks  Patient did discuss with the hematologist after treatment for her ductal carcinoma insitu did not require any additional a Naresh therapy and will continue with her diagnostic imaging and follow-ups with surgical oncology  They also reviewed her elevation in platelets and felt it was not significant enough for any additional evaluation but could have annual CBC which will be ordered for her labs in 3 months as well      patient works at [employer] and does a lot of fine hand movements and motions with cooking and shopping and has noticed increased symptoms of her carpal tunnel  She does have braces and will resume wearing them at night and during the daytime as well and if no additional improvement will need updated EMG and possible referral to Ortho  You are scheduled for your follow-up diagnostic mammogram June 30th and are scheduled for follow-up with surgical oncology status post ductal breast cancer      As noted patient already scheduled for diagnostic mammogram next week which will evaluate for any changes or breast abnormalities considering she does get some discomfort  She does admit however most frequently associated with menstruation  The following portions of the patient's history were reviewed and updated as appropriate: allergies, current medications, past family history, past medical history, past social history, past surgical history and problem list     Review of Systems   Constitutional: Negative  Negative for chills and fever  Tired and concerned no weight loss   Eyes: Negative  Respiratory: Negative  Negative for cough and shortness of breath  Cardiovascular: Negative  Negative for chest pain and palpitations  Sometimes at night just before sleep sometimes a stronger heartbeat  No other time , not when active  Gastrointestinal: Negative  Endocrine: Negative for polydipsia, polyphagia and polyuria  Genitourinary: Negative for menstrual problem  Musculoskeletal: Positive for arthralgias  Negative for joint swelling  Sometimes pain lateral breasts almost a burning     Skin: Negative  Negative for color change and rash  Neurological: Positive for numbness  Psychiatric/Behavioral: The patient is nervous/anxious  Objective:      /95 (BP Location: Left arm, Patient Position: Sitting, Cuff Size: Large)   Pulse 72   Temp 97 7 °F (36 5 °C) (Temporal)   Ht 5' 6" (1 676 m)   Wt 111 kg (244 lb)   SpO2 95%   BMI 39 38 kg/m²          Physical Exam  Vitals and nursing note reviewed  Constitutional:       General: She is not in acute distress  Appearance: She is obese  She is not ill-appearing  HENT:      Head: Normocephalic  Eyes:      Extraocular Movements: Extraocular movements intact  Conjunctiva/sclera: Conjunctivae normal    Neck:      Vascular: No carotid bruit  Cardiovascular:      Rate and Rhythm: Normal rate and regular rhythm  Heart sounds: Normal heart sounds     Pulmonary:      Effort: Pulmonary effort is normal       Breath sounds: Normal breath sounds  No wheezing  Abdominal:      General: Bowel sounds are normal    Musculoskeletal:         General: No swelling or deformity  Cervical back: Neck supple  Right lower leg: No edema  Left lower leg: No edema  Skin:     General: Skin is warm  Neurological:      Mental Status: She is alert  Mental status is at baseline  Psychiatric:         Mood and Affect: Mood normal          Thought Content: Thought content normal              PHQ-9 Depression Screening    PHQ-9:   Frequency of the following problems over the past two weeks:      Little interest or pleasure in doing things: 0 - not at all  Feeling down, depressed, or hopeless: 0 - not at all       BMI Counseling: Body mass index is 39 38 kg/m²  The BMI is above normal  Patient referred to weight management due to patient being obese

## 2021-06-16 NOTE — PATIENT INSTRUCTIONS
On your visit today we discussed a number of your medical concerns as well as lab results  As per discussion you report you made significant changes to your diet have been also taking omega-3 and eating fish but have had no improvement and in fact her cholesterol level has increased  You also confirm known family history with elevated cholesterol as well  After discussion you would like a trial of medication and atorvastatin 20 mg has been sent to her pharmacy 1 tablet daily in the evening  As per our discussion script provided for follow-up labs to re-evaluate liver and cholesterol levels in 3 months  We did review that no additional refills will be provided until follow-up labs are completed in 3 months  We did discuss that you did have a slight increase in liver function enzymes that were normal in the past   This will not affect her ability to start cholesterol medicine but I have not ordered the follow-up testing including ultrasound and screening for hepatitis  May be due to fatty liver and that is what the ultrasound will evaluate for  You are also feeling tired and feel like her hormones are off  Thyroid function in the past has been normal new testing ordered today  We did review however that you must stop taking the biotin for at least 2 weeks and you can get the labs completed June 30th or after  Referral also provided today to weight management to meet with dietitian and nutritionist to come up with a plan to help you with your goals of healthy meaningful weight loss  You are scheduled for your follow-up diagnostic mammogram June 30th and already have an appointment scheduled for the surgical oncologist after to review  The slight elevation in your platelets and white blood cell counts was addressed by the hematologist who agreed nothing significant does not require any additional evaluation but can complete annual CBC to monitor    This has been ordered to be completed with your labs as dated in 3 months  We also discussed that your having additional symptoms not only of carpal tunnel but of some tendinitis secondary to working as a  with repetitive fine hand movements  You do have the braces and will resume wearing them at bedtime and during the day if needed  If you have no improvement or progression of symptoms will provide referral to Ortho at that time

## 2021-06-18 ENCOUNTER — HOSPITAL ENCOUNTER (OUTPATIENT)
Dept: ULTRASOUND IMAGING | Facility: HOSPITAL | Age: 41
Discharge: HOME | End: 2021-06-18
Payer: COMMERCIAL

## 2021-06-18 DIAGNOSIS — R79.89 ELEVATED LFTS: ICD-10-CM

## 2021-06-18 PROCEDURE — 76705 ECHO EXAM OF ABDOMEN: CPT

## 2021-06-30 ENCOUNTER — HOSPITAL ENCOUNTER (OUTPATIENT)
Dept: MAMMOGRAPHY | Facility: CLINIC | Age: 41
Discharge: HOME | End: 2021-06-30
Payer: COMMERCIAL

## 2021-06-30 VITALS — WEIGHT: 244 LBS | HEIGHT: 66 IN | BODY MASS INDEX: 39.21 KG/M2

## 2021-06-30 DIAGNOSIS — C50.919 BREAST CANCER IN FEMALE (HCC): ICD-10-CM

## 2021-06-30 PROCEDURE — 77066 DX MAMMO INCL CAD BI: CPT

## 2021-06-30 PROCEDURE — G0279 TOMOSYNTHESIS, MAMMO: HCPCS

## 2021-07-20 ENCOUNTER — TELEPHONE (OUTPATIENT)
Dept: INTERNAL MEDICINE CLINIC | Facility: CLINIC | Age: 41
End: 2021-07-20

## 2021-07-20 NOTE — TELEPHONE ENCOUNTER
[address] [facility] patient dropped off BP logs and they have been scanned into the patient's chart

## 2021-07-22 ENCOUNTER — TELEPHONE (OUTPATIENT)
Dept: OBGYN CLINIC | Facility: CLINIC | Age: 41
End: 2021-07-22

## 2021-07-22 NOTE — TELEPHONE ENCOUNTER
Patient called to cancel annual office visit today 7/22/21 at [phone] due to an emergency with her pet  She will call back to reschedule

## 2021-07-23 NOTE — TELEPHONE ENCOUNTER
Received a call back from the patient- call was transferred to me  I advised the patient of Teagan's recommendations  She verbally understood and would like to schedule a sooner appointment but she is starting a new job on Monday and has no idea what her hours are going to be like  Patient advised me that she will call back next week once she knows her hours and schedule a sooner appointment

## 2021-09-14 ENCOUNTER — OFFICE VISIT (OUTPATIENT)
Dept: SURGICAL ONCOLOGY | Facility: CLINIC | Age: 41
End: 2021-09-14
Payer: COMMERCIAL

## 2021-09-14 VITALS
SYSTOLIC BLOOD PRESSURE: 150 MMHG | OXYGEN SATURATION: 99 % | HEART RATE: 70 BPM | TEMPERATURE: 97.1 F | WEIGHT: 244 LBS | HEIGHT: 66 IN | BODY MASS INDEX: 39.21 KG/M2 | RESPIRATION RATE: 17 BRPM | DIASTOLIC BLOOD PRESSURE: 92 MMHG

## 2021-09-14 DIAGNOSIS — Z86.000 HISTORY OF DUCTAL CARCINOMA IN SITU (DCIS) OF BREAST: ICD-10-CM

## 2021-09-14 DIAGNOSIS — Z08 ENCOUNTER FOR FOLLOW-UP EXAMINATION AFTER COMPLETED TREATMENT FOR MALIGNANT NEOPLASM: Primary | ICD-10-CM

## 2021-09-14 PROCEDURE — 99213 OFFICE O/P EST LOW 20 MIN: CPT | Performed by: NURSE PRACTITIONER

## 2021-09-14 PROCEDURE — 1036F TOBACCO NON-USER: CPT | Performed by: NURSE PRACTITIONER

## 2021-09-14 NOTE — PROGRESS NOTES
Surgical Oncology Follow Up       [address] [facility] SURGICAL ONCOLOGY [place]  [address]  [place] [address]    Norm Coles  1980  [id number]  [address] [facility] [place]  [address] [address]    Chief Complaint   Patient presents with    Follow-up       Assessment/Plan:  1  Encounter for follow-up examination after completed treatment for malignant neoplasm    2  History of ductal carcinoma in situ (DCIS) of breast  - 6 mo f/u visit      Discussion/Summary:  Patient is a 57-year-old female who presents today for follow-up visit for left breast cancer diagnosed in July of 2020  Her pathology revealed DCIS, ER/%  She underwent genetic testing which was negative  She underwent a left lumpectomy with Dr Tavo Hodge  Her tumor was low risk on DCISion testing so she therefore did not receive adjuvant RT  She had a bilateral 3D diagnostic mammogram on June 30, 2021 which was BI-RADS 1, category 1 density  Her only complaint today is difficulty losing weight and an occasional burning pain in her left lateral chest wall region  I suspect her pain is muscular as she reports moderate tenderness with palpation of her lateral chest wall muscles  Recommended that she apply heat and perform stretching exercises  I instructed her to contact me if she is interested in a referral to physical therapy  She is not currently interested in a referral to medical weight management  There are no worrisome findings on her exam today  I will plan to see her back again in 6 months for another clinical exam or sooner should the need arise  She was instructed to call with any new concerns or symptoms prior to that time  All of her questions were answered today      History of Present Illness:     Oncology History   History of ductal carcinoma in situ (DCIS) of breast   7/20/2020 Biopsy    Left breast stereotactic biopsy  11 o'clock, anterior depth  Ductal carcinoma in situ  Grade 2        Concordant  Malignancy appears unifocal  Calcifications spanned 5 mm  Right breast clear  7/24/2020 Genetic Testing    The following genes were evaluated: AARON, BRCA1, BRCA2, CDH1, CHEK2, PALB2, PTEN, STK11, TP53  Additional genes were analyzed for a total of 20 genes  Negative result  No pathogenic sequence variants or deletions/dupllications identified  [facility]     8/28/2020 Surgery    Left breast needle localized lumpectomy  Ductal carcinoma in situ  Grade 2  Size cannot be determined (5/34 blocks)  Margins negative  Stage 0     9/14/2020 Genomic Testing    DCISion RT low risk  10 year total risk with breast conserving surgery alone 7%  10 year total risk with breast conserving surgery and radiation 7%     10/27/2020 - 10/27/2020 Hormone Therapy    Consult with Dr Kerwin Welsh Tamoxifen          -Interval History:  Patient notices no changes on her self breast exam   She gets an occasional burning pain in her left axillary region which last for about 5 minutes and dissipates  She denies persistent headaches, back pain or bone pain, cough or shortness of breath, abdominal pain  She is working on losing weight  She had a bilateral mammogram which was BI-RADS 1  Review of Systems:  Review of Systems   Constitutional: Negative for activity change, appetite change, chills, fatigue, fever and unexpected weight change  Respiratory: Negative for cough and shortness of breath  Cardiovascular: Negative for chest pain  Gastrointestinal: Negative for abdominal pain, constipation, diarrhea, nausea and vomiting  Musculoskeletal: Negative for arthralgias, back pain, gait problem and myalgias  Skin: Negative for color change and rash  Neurological: Negative for dizziness and headaches  Hematological: Negative for adenopathy  Psychiatric/Behavioral: Negative for agitation and confusion     All other systems reviewed and are negative  Patient Active Problem List   Diagnosis    Anxiety    History of ductal carcinoma in situ (DCIS) of breast    Class 2 obesity due to excess calories without serious comorbidity with body mass index (BMI) of 39 0 to 39 9 in adult    Pure hypercholesterolemia    Gastroesophageal reflux disease    Encounter for follow-up examination after completed treatment for malignant neoplasm     Past Medical History:   Diagnosis Date    Abnormal Pap smear of cervix     Anxiety 01/2015    Last assessed    BRCA gene mutation negative 07/24/2020    [facility] 20 gene panel    Breast cancer ([facility] [address] ) 08/28/2020    left breast DCIS    HPV (human papilloma virus) infection     unsure     Past Surgical History:   Procedure Laterality Date    BREAST BIOPSY Left 07/20/2020    DCIS    BREAST CYST EXCISION Left 08/2020    DCIS    BREAST LUMPECTOMY Left 8/28/2020    Procedure: BREAST NEEDLE LOCALIZED LUMPECTOMY (NEEDLE LOC AT 0900);   Surgeon: Naga Parikh MD;  [facility];  Service: Surgical Oncology    CHOLECYSTECTOMY      CHOLECYSTECTOMY LAPAROSCOPIC  01/2015    Last assessed    COLPOSCOPY      MAMMO NEEDLE LOCALIZATION LEFT (ALL INC) Left 8/28/2020    MAMMO STEREOTACTIC BREAST BIOPSY LEFT (ALL INC) Left 7/20/2020    MOUTH SURGERY       Family History   Problem Relation Age of Onset    Heart attack Father         Acute    Heart failure Father     Coronary artery disease Father     Diabetes Father         DM    Hypertension Father     No Known Problems Maternal Grandmother     Alzheimer's disease Maternal Grandfather     Stroke Paternal Grandmother     Prostate cancer Paternal Grandfather 76    No Known Problems Mother     No Known Problems Son     No Known Problems Sister     No Known Problems Half-Sister     No Known Problems Half-Sister     No Known Problems Brother     No Known Problems Maternal Uncle     No Known Problems Maternal Uncle     No Known Problems Maternal Aunt     No Known Problems Maternal Aunt     No Known Problems Maternal Aunt     No Known Problems Paternal Uncle     No Known Problems Paternal Uncle      Social History     Socioeconomic History    Marital status: Single     Spouse name: Not on file    Number of children: Not on file    Years of education: Not on file    Highest education level: Not on file   Occupational History    Not on file   Tobacco Use    Smoking status: Former Smoker     Packs/day: 1 00     Types: Cigarettes     Quit date: 2019     Years since quittin 4    Smokeless tobacco: Never Used   Vaping Use    Vaping Use: Every day    Substances: Nicotine   Substance and Sexual Activity    Alcohol use: Yes     Alcohol/week: 2 0 standard drinks     Types: 2 Standard drinks or equivalent per week    Drug use: Not Currently     Types: Marijuana    Sexual activity: Not Currently     Partners: Male     Birth control/protection: I U D  Other Topics Concern    Not on file   Social History Narrative    Caffeine use, Does not exercise, Full-time work, One child, Single,      Social Determinants of Health     Financial Resource Strain:     Difficulty of Paying Living Expenses:    Food Insecurity:     Worried About [address] in the Last Year:    [address] in the Last Year:    Transportation Needs:     Lack of Transportation (Medical):      Lack of Transportation (Non-Medical):    Physical Activity:     Days of Exercise per Week:     Minutes of Exercise per Session:    Stress:     Feeling of Stress :    Social Connections:     Frequency of Communication with Friends and Family:     Frequency of Social Gatherings with Friends and Family:     Attends [employer] Services:     Active Member of Clubs or Organizations:     Attends Club or Organization Meetings:     Marital Status:    Intimate Partner Violence:     Fear of Current or Ex-Partner:     Emotionally Abused:     Physically Abused:     Sexually Abused:        Current Outpatient Medications:     atorvastatin (LIPITOR) 20 mg tablet, Take 1 tablet (20 mg total) by mouth daily, Disp: 90 tablet, Rfl: 0    cetirizine (ZyrTEC) 10 mg tablet, Take 10 mg by mouth daily, Disp: , Rfl:     DAILY MULTIPLE VITAMINS/IRON TABS, Take by mouth, Disp: , Rfl:     Omega-3 Fatty Acids (fish oil) 1,000 mg, Take 1,000 mg by mouth daily, Disp: , Rfl:     Probiotic Product (ACIDOPHILUS PROBIOTIC BLEND) CAPS, Take by mouth, Disp: , Rfl:     Biotin 5000 MCG CAPS, Take by mouth, Disp: , Rfl:     Blood Pressure Monitoring (BLOOD PRESSURE MONITOR/M CUFF) MISC, by Does not apply route daily, Disp: 1 each, Rfl: 0  No Known Allergies  Vitals:    09/14/21 1327   BP: 150/92   Pulse: 70   Resp: 17   Temp: (!) 97 1 °F (36 2 °C)   SpO2: 99%       Physical Exam  Vitals reviewed  Constitutional:       General: She is not in acute distress  Appearance: Normal appearance  She is well-developed  She is not diaphoretic  HENT:      Head: Normocephalic and atraumatic  Cardiovascular:      Rate and Rhythm: Normal rate and regular rhythm  Heart sounds: Normal heart sounds  Pulmonary:      Effort: Pulmonary effort is normal       Breath sounds: Normal breath sounds  Chest:      Chest wall: Tenderness (left lateral chest wall extending into back) present  Breasts:         Right: No swelling, bleeding, inverted nipple, mass, nipple discharge, skin change or tenderness  Left: Skin change (surgical scar) present  No swelling, bleeding, inverted nipple, mass, nipple discharge or tenderness  Abdominal:      Palpations: Abdomen is soft  There is no mass  Tenderness: There is no abdominal tenderness  Musculoskeletal:         General: Normal range of motion  Cervical back: Normal range of motion  Lymphadenopathy:      Upper Body:      Right upper body: No supraclavicular or axillary adenopathy  Left upper body: No supraclavicular or axillary adenopathy  Skin:     General: Skin is warm and dry  Findings: No rash  Neurological:      Mental Status: She is alert and oriented to person, place, and time  Psychiatric:         Speech: Speech normal          Advance Care Planning/Advance Directives:  Discussed disease status, cancer treatment plans and/or cancer treatment goals with the patient

## 2021-09-16 ENCOUNTER — APPOINTMENT (OUTPATIENT)
Dept: LAB | Facility: CLINIC | Age: 41
End: 2021-09-16
Payer: COMMERCIAL

## 2021-09-16 DIAGNOSIS — E78.00 PURE HYPERCHOLESTEROLEMIA: Chronic | ICD-10-CM

## 2021-09-16 DIAGNOSIS — D75.839 THROMBOCYTOSIS: ICD-10-CM

## 2021-09-16 DIAGNOSIS — R79.89 ELEVATED LFTS: ICD-10-CM

## 2021-09-16 LAB
ALBUMIN SERPL BCP-MCNC: 3.9 G/DL (ref 3.5–5)
ALP SERPL-CCNC: 106 U/L (ref 46–116)
ALT SERPL W P-5'-P-CCNC: 70 U/L (ref 12–78)
AST SERPL W P-5'-P-CCNC: 62 U/L (ref 5–45)
BASOPHILS # BLD AUTO: 0.09 THOUSANDS/ΜL (ref 0–0.1)
BASOPHILS NFR BLD AUTO: 1 % (ref 0–1)
BILIRUB DIRECT SERPL-MCNC: 0.23 MG/DL (ref 0–0.2)
BILIRUB SERPL-MCNC: 0.74 MG/DL (ref 0.2–1)
CHOLEST SERPL-MCNC: 160 MG/DL (ref 50–200)
EOSINOPHIL # BLD AUTO: 0.32 THOUSAND/ΜL (ref 0–0.61)
EOSINOPHIL NFR BLD AUTO: 3 % (ref 0–6)
ERYTHROCYTE [DISTWIDTH] IN BLOOD BY AUTOMATED COUNT: 13.2 % (ref 11.6–15.1)
HCT VFR BLD AUTO: 41 % (ref 34.8–46.1)
HCV AB SER QL: NORMAL
HDLC SERPL-MCNC: 79 MG/DL
HGB BLD-MCNC: 13 G/DL (ref 11.5–15.4)
IMM GRANULOCYTES # BLD AUTO: 0.03 THOUSAND/UL (ref 0–0.2)
IMM GRANULOCYTES NFR BLD AUTO: 0 % (ref 0–2)
LDLC SERPL CALC-MCNC: 70 MG/DL (ref 0–100)
LYMPHOCYTES # BLD AUTO: 2.73 THOUSANDS/ΜL (ref 0.6–4.47)
LYMPHOCYTES NFR BLD AUTO: 27 % (ref 14–44)
MCH RBC QN AUTO: 30 PG (ref 26.8–34.3)
MCHC RBC AUTO-ENTMCNC: 31.7 G/DL (ref 31.4–37.4)
MCV RBC AUTO: 95 FL (ref 82–98)
MONOCYTES # BLD AUTO: 0.59 THOUSAND/ΜL (ref 0.17–1.22)
MONOCYTES NFR BLD AUTO: 6 % (ref 4–12)
NEUTROPHILS # BLD AUTO: 6.3 THOUSANDS/ΜL (ref 1.85–7.62)
NEUTS SEG NFR BLD AUTO: 63 % (ref 43–75)
NRBC BLD AUTO-RTO: 0 /100 WBCS
PLATELET # BLD AUTO: 421 THOUSANDS/UL (ref 149–390)
PMV BLD AUTO: 9.6 FL (ref 8.9–12.7)
PROT SERPL-MCNC: 8.1 G/DL (ref 6.4–8.2)
RBC # BLD AUTO: 4.34 MILLION/UL (ref 3.81–5.12)
TRIGL SERPL-MCNC: 54 MG/DL
TSH SERPL DL<=0.05 MIU/L-ACNC: 2.25 UIU/ML (ref 0.36–3.74)
WBC # BLD AUTO: 10.06 THOUSAND/UL (ref 4.31–10.16)

## 2021-09-16 PROCEDURE — 80076 HEPATIC FUNCTION PANEL: CPT

## 2021-09-16 PROCEDURE — 85025 COMPLETE CBC W/AUTO DIFF WBC: CPT

## 2021-09-16 PROCEDURE — 36415 COLL VENOUS BLD VENIPUNCTURE: CPT

## 2021-09-16 PROCEDURE — 84443 ASSAY THYROID STIM HORMONE: CPT | Performed by: PHYSICIAN ASSISTANT

## 2021-09-16 PROCEDURE — 86803 HEPATITIS C AB TEST: CPT

## 2021-09-16 PROCEDURE — 80061 LIPID PANEL: CPT

## 2021-09-20 ENCOUNTER — OFFICE VISIT (OUTPATIENT)
Dept: INTERNAL MEDICINE CLINIC | Facility: CLINIC | Age: 41
End: 2021-09-20

## 2021-09-20 VITALS
TEMPERATURE: 98.6 F | HEIGHT: 66 IN | HEART RATE: 80 BPM | BODY MASS INDEX: 39.05 KG/M2 | WEIGHT: 243 LBS | OXYGEN SATURATION: 97 % | SYSTOLIC BLOOD PRESSURE: 139 MMHG | DIASTOLIC BLOOD PRESSURE: 86 MMHG

## 2021-09-20 DIAGNOSIS — K76.0 HEPATIC STEATOSIS: ICD-10-CM

## 2021-09-20 DIAGNOSIS — D75.839 THROMBOCYTOSIS: ICD-10-CM

## 2021-09-20 DIAGNOSIS — Z86.000 HISTORY OF DUCTAL CARCINOMA IN SITU (DCIS) OF BREAST: ICD-10-CM

## 2021-09-20 DIAGNOSIS — E78.00 PURE HYPERCHOLESTEROLEMIA: Primary | Chronic | ICD-10-CM

## 2021-09-20 DIAGNOSIS — E66.09 CLASS 2 OBESITY DUE TO EXCESS CALORIES WITHOUT SERIOUS COMORBIDITY WITH BODY MASS INDEX (BMI) OF 39.0 TO 39.9 IN ADULT: ICD-10-CM

## 2021-09-20 PROCEDURE — 3008F BODY MASS INDEX DOCD: CPT | Performed by: NURSE PRACTITIONER

## 2021-09-20 PROCEDURE — 99213 OFFICE O/P EST LOW 20 MIN: CPT | Performed by: PHYSICIAN ASSISTANT

## 2021-09-20 RX ORDER — ATORVASTATIN CALCIUM 20 MG/1
20 TABLET, FILM COATED ORAL DAILY
Qty: 90 TABLET | Refills: 1 | Status: SHIPPED | OUTPATIENT
Start: 2021-09-20 | End: 2022-03-09

## 2021-09-20 NOTE — ASSESSMENT & PLAN NOTE
As per our review today you have already made significant changes in diet and now have a job with a lot more physical activity as well  With ongoing heart healthy diet and healthy meaningful weight loss fatty liver disease will continue to improve

## 2021-09-20 NOTE — ASSESSMENT & PLAN NOTE
Platelet counts continue to show improvement and recommendation per hematologist is to re-evaluate in 1 year

## 2021-09-20 NOTE — ASSESSMENT & PLAN NOTE
As per our discussion today you do not feel the need for referral to weight management at this time  You report you are aware of what foods you should be eating and others you should be avoiding  You are also aware of the importance of increase physical activity as well  Will continue to monitor your weight and you know that ongoing healthy meaningful weight loss also helps improve your fatty liver condition

## 2021-09-20 NOTE — PROGRESS NOTES
Assessment/Plan: Thrombocytosis ([facility] [address] )    Platelet counts continue to show improvement and recommendation per hematologist is to re-evaluate in 1 year  Pure hypercholesterolemia    As per our discussion today your cholesterol levels have shown a significant improvement  This is a combination of the atorvastatin, diet and increase physical activity  You will continue the atorvastatin daily script provided for new labs as dated in 6 months  Continue your heart healthy diet and exercise for healthy meaningful weight loss  We did review that if your levels continue to improve can always look to decrease or a trial off of the medication as well  History of ductal carcinoma in situ (DCIS) of breast    Continue follow-up as scheduled with  Surgical oncologist      Class 2 obesity due to excess calories without serious comorbidity with body mass index (BMI) of 39 0 to 39 9 in adult   As per our discussion today you do not feel the need for referral to weight management at this time  You report you are aware of what foods you should be eating and others you should be avoiding  You are also aware of the importance of increase physical activity as well  Will continue to monitor your weight and you know that ongoing healthy meaningful weight loss also helps improve your fatty liver condition  Hepatic steatosis    As per our review today you have already made significant changes in diet and now have a job with a lot more physical activity as well  With ongoing heart healthy diet and healthy meaningful weight loss fatty liver disease will continue to improve  Diagnoses and all orders for this visit:    Pure hypercholesterolemia  -     atorvastatin (LIPITOR) 20 mg tablet; Take 1 tablet (20 mg total) by mouth daily  -     Comprehensive metabolic panel; Future  -     Lipid Panel with Direct LDL reflex;  Future    Class 2 obesity due to excess calories without serious comorbidity with body mass index (BMI) of 39 0 to 39 9 in adult    Hepatic steatosis    History of ductal carcinoma in situ (DCIS) of breast    Thrombocytosis (HCC)          Subjective:      Patient ID: Steff Ahn is a 35 y o  female  Patient presents today for routine follow-up of chronic medical conditions and lab review  Patient also continues to follow with surgical oncology status post diagnosis of DCIS  Patient saw surgical oncology last week  Patient had lumpectomy completed, genetic testing was negative and therefore radiation therapy was not required  Patient is scheduled for follow-up in 6 months  At last visit 3 months ago patient was started on atorvastatin regarding elevated cholesterol levels and here for follow-up  Patient has follow-up labs do show significant improvement in lipid levels and in fact liver functions improved  Patient was also seen by Heme-Onc in the past secondary to thrombocytosis  Patient's CBC shows slightly elevated platelets but also improving  As per Hematology no abnormalities or concerns just recommended monitoring levels once a year  Routine screening for hepatitis-C is negative  Started new job at [employer] and a lot more walking  States has made significant diet changes and reduced salt and fatty foods  Does not feel need for formal weight management at this time  Patient did receive her COVID vaccine series  The following portions of the patient's history were reviewed and updated as appropriate: allergies, current medications, past family history, past medical history, past social history, past surgical history and problem list     Review of Systems   Constitutional: Negative for chills and fever  Respiratory: Negative  Negative for cough and shortness of breath  Cardiovascular: Negative  Negative for chest pain and palpitations  Gastrointestinal: Negative  Endocrine: Negative  Musculoskeletal: Negative for myalgias     Neurological: Negative  Objective:      /86 (BP Location: Left arm, Patient Position: Sitting, Cuff Size: Large)   Pulse 80   Temp 98 6 °F (37 °C) (Temporal)   Ht 5' 6" (1 676 m)   Wt 110 kg (243 lb)   SpO2 97%   BMI 39 22 kg/m²          Physical Exam  Vitals reviewed  Constitutional:       General: She is not in acute distress  Appearance: She is obese  HENT:      Head: Normocephalic  Neck:      Vascular: No carotid bruit  Cardiovascular:      Rate and Rhythm: Normal rate and regular rhythm  Heart sounds: Normal heart sounds  Pulmonary:      Effort: Pulmonary effort is normal       Breath sounds: Normal breath sounds  Abdominal:      General: Bowel sounds are normal    Musculoskeletal:      Cervical back: Neck supple  Neurological:      Mental Status: She is alert  Mental status is at baseline     Psychiatric:         Mood and Affect: Mood normal

## 2021-09-20 NOTE — ASSESSMENT & PLAN NOTE
As per our discussion today your cholesterol levels have shown a significant improvement  This is a combination of the atorvastatin, diet and increase physical activity  You will continue the atorvastatin daily script provided for new labs as dated in 6 months  Continue your heart healthy diet and exercise for healthy meaningful weight loss  We did review that if your levels continue to improve can always look to decrease or a trial off of the medication as well

## 2021-10-27 ENCOUNTER — TELEPHONE (OUTPATIENT)
Dept: OBGYN CLINIC | Facility: CLINIC | Age: 41
End: 2021-10-27

## 2022-01-31 ENCOUNTER — TELEPHONE (OUTPATIENT)
Dept: OBGYN CLINIC | Facility: CLINIC | Age: 42
End: 2022-01-31

## 2022-01-31 NOTE — TELEPHONE ENCOUNTER
Left message to patient informing her she is overdue for annual exam and should give us a call back to reschedule

## 2022-02-25 ENCOUNTER — APPOINTMENT (EMERGENCY)
Dept: CT IMAGING | Facility: HOSPITAL | Age: 42
End: 2022-02-25
Payer: MEDICARE

## 2022-02-25 ENCOUNTER — HOSPITAL ENCOUNTER (EMERGENCY)
Facility: HOSPITAL | Age: 42
Discharge: HOME | End: 2022-02-25
Attending: EMERGENCY MEDICINE | Admitting: EMERGENCY MEDICINE
Payer: MEDICARE

## 2022-02-25 VITALS
BODY MASS INDEX: 39.22 KG/M2 | RESPIRATION RATE: 18 BRPM | DIASTOLIC BLOOD PRESSURE: 81 MMHG | SYSTOLIC BLOOD PRESSURE: 167 MMHG | TEMPERATURE: 97.7 F | OXYGEN SATURATION: 99 % | WEIGHT: 243 LBS | HEART RATE: 66 BPM

## 2022-02-25 DIAGNOSIS — E27.8 ADRENAL NODULE (HCC): ICD-10-CM

## 2022-02-25 DIAGNOSIS — N20.0 KIDNEY STONE: Primary | ICD-10-CM

## 2022-02-25 LAB
ALBUMIN SERPL BCP-MCNC: 3.8 G/DL (ref 3.5–5)
ALP SERPL-CCNC: 106 U/L (ref 46–116)
ALT SERPL W P-5'-P-CCNC: 55 U/L (ref 12–78)
AMORPH PHOS CRY URNS QL MICRO: ABNORMAL /HPF
ANION GAP SERPL CALCULATED.3IONS-SCNC: 14 MMOL/L (ref 4–13)
AST SERPL W P-5'-P-CCNC: 48 U/L (ref 5–45)
BACTERIA UR QL AUTO: ABNORMAL /HPF
BASOPHILS # BLD AUTO: 0.07 THOUSANDS/ΜL (ref 0–0.1)
BASOPHILS NFR BLD AUTO: 1 % (ref 0–1)
BILIRUB SERPL-MCNC: 0.45 MG/DL (ref 0.2–1)
BILIRUB UR QL STRIP: NEGATIVE
BUN SERPL-MCNC: 14 MG/DL (ref 5–25)
CALCIUM SERPL-MCNC: 9.4 MG/DL (ref 8.3–10.1)
CHLORIDE SERPL-SCNC: 103 MMOL/L (ref 100–108)
CLARITY UR: ABNORMAL
CO2 SERPL-SCNC: 24 MMOL/L (ref 21–32)
COLOR UR: ABNORMAL
CREAT SERPL-MCNC: 0.71 MG/DL (ref 0.6–1.3)
EOSINOPHIL # BLD AUTO: 0.21 THOUSAND/ΜL (ref 0–0.61)
EOSINOPHIL NFR BLD AUTO: 2 % (ref 0–6)
ERYTHROCYTE [DISTWIDTH] IN BLOOD BY AUTOMATED COUNT: 13.4 % (ref 11.6–15.1)
EXT PREG TEST URINE: NEGATIVE
EXT. CONTROL ED NAV: NORMAL
GFR SERPL CREATININE-BSD FRML MDRD: 104 ML/MIN/1.73SQ M
GLUCOSE SERPL-MCNC: 118 MG/DL (ref 65–140)
GLUCOSE UR STRIP-MCNC: NEGATIVE MG/DL
HCT VFR BLD AUTO: 37.4 % (ref 34.8–46.1)
HGB BLD-MCNC: 12.4 G/DL (ref 11.5–15.4)
HGB UR QL STRIP.AUTO: ABNORMAL
IMM GRANULOCYTES # BLD AUTO: 0.06 THOUSAND/UL (ref 0–0.2)
IMM GRANULOCYTES NFR BLD AUTO: 1 % (ref 0–2)
KETONES UR STRIP-MCNC: NEGATIVE MG/DL
LEUKOCYTE ESTERASE UR QL STRIP: NEGATIVE
LIPASE SERPL-CCNC: 74 U/L (ref 73–393)
LYMPHOCYTES # BLD AUTO: 1.98 THOUSANDS/ΜL (ref 0.6–4.47)
LYMPHOCYTES NFR BLD AUTO: 17 % (ref 14–44)
MCH RBC QN AUTO: 30.4 PG (ref 26.8–34.3)
MCHC RBC AUTO-ENTMCNC: 33.2 G/DL (ref 31.4–37.4)
MCV RBC AUTO: 92 FL (ref 82–98)
MONOCYTES # BLD AUTO: 0.58 THOUSAND/ΜL (ref 0.17–1.22)
MONOCYTES NFR BLD AUTO: 5 % (ref 4–12)
NEUTROPHILS # BLD AUTO: 9.03 THOUSANDS/ΜL (ref 1.85–7.62)
NEUTS SEG NFR BLD AUTO: 74 % (ref 43–75)
NITRITE UR QL STRIP: NEGATIVE
NON-SQ EPI CELLS URNS QL MICRO: ABNORMAL /HPF
NRBC BLD AUTO-RTO: 0 /100 WBCS
PH UR STRIP.AUTO: 7 [PH] (ref 4.5–8)
PLATELET # BLD AUTO: 432 THOUSANDS/UL (ref 149–390)
PMV BLD AUTO: 9.8 FL (ref 8.9–12.7)
POTASSIUM SERPL-SCNC: 4 MMOL/L (ref 3.5–5.3)
PROT SERPL-MCNC: 8 G/DL (ref 6.4–8.2)
PROT UR STRIP-MCNC: ABNORMAL MG/DL
RBC # BLD AUTO: 4.08 MILLION/UL (ref 3.81–5.12)
RBC #/AREA URNS AUTO: ABNORMAL /HPF
SODIUM SERPL-SCNC: 141 MMOL/L (ref 136–145)
SP GR UR STRIP.AUTO: 1.02 (ref 1–1.03)
UROBILINOGEN UR QL STRIP.AUTO: 0.2 E.U./DL
WBC # BLD AUTO: 11.93 THOUSAND/UL (ref 4.31–10.16)
WBC #/AREA URNS AUTO: ABNORMAL /HPF

## 2022-02-25 PROCEDURE — 99284 EMERGENCY DEPT VISIT MOD MDM: CPT

## 2022-02-25 PROCEDURE — 36415 COLL VENOUS BLD VENIPUNCTURE: CPT | Performed by: PHYSICIAN ASSISTANT

## 2022-02-25 PROCEDURE — 96361 HYDRATE IV INFUSION ADD-ON: CPT

## 2022-02-25 PROCEDURE — 80053 COMPREHEN METABOLIC PANEL: CPT | Performed by: PHYSICIAN ASSISTANT

## 2022-02-25 PROCEDURE — 99285 EMERGENCY DEPT VISIT HI MDM: CPT | Performed by: PHYSICIAN ASSISTANT

## 2022-02-25 PROCEDURE — 85025 COMPLETE CBC W/AUTO DIFF WBC: CPT | Performed by: PHYSICIAN ASSISTANT

## 2022-02-25 PROCEDURE — 83690 ASSAY OF LIPASE: CPT | Performed by: PHYSICIAN ASSISTANT

## 2022-02-25 PROCEDURE — 96374 THER/PROPH/DIAG INJ IV PUSH: CPT

## 2022-02-25 PROCEDURE — 81001 URINALYSIS AUTO W/SCOPE: CPT

## 2022-02-25 PROCEDURE — 96375 TX/PRO/DX INJ NEW DRUG ADDON: CPT

## 2022-02-25 PROCEDURE — 74177 CT ABD & PELVIS W/CONTRAST: CPT

## 2022-02-25 PROCEDURE — 81025 URINE PREGNANCY TEST: CPT | Performed by: PHYSICIAN ASSISTANT

## 2022-02-25 PROCEDURE — G1004 CDSM NDSC: HCPCS

## 2022-02-25 RX ORDER — HYDROMORPHONE HCL/PF 1 MG/ML
0.5 SYRINGE (ML) INJECTION ONCE
Status: COMPLETED | OUTPATIENT
Start: 2022-02-25 | End: 2022-02-25

## 2022-02-25 RX ORDER — KETOROLAC TROMETHAMINE 30 MG/ML
15 INJECTION, SOLUTION INTRAMUSCULAR; INTRAVENOUS ONCE
Status: COMPLETED | OUTPATIENT
Start: 2022-02-25 | End: 2022-02-25

## 2022-02-25 RX ORDER — OXYCODONE HYDROCHLORIDE AND ACETAMINOPHEN 5; 325 MG/1; MG/1
1 TABLET ORAL EVERY 8 HOURS PRN
Qty: 10 TABLET | Refills: 0 | Status: SHIPPED | OUTPATIENT
Start: 2022-02-25 | End: 2022-03-07

## 2022-02-25 RX ORDER — ONDANSETRON 2 MG/ML
4 INJECTION INTRAMUSCULAR; INTRAVENOUS ONCE
Status: COMPLETED | OUTPATIENT
Start: 2022-02-25 | End: 2022-02-25

## 2022-02-25 RX ORDER — ONDANSETRON 4 MG/1
4 TABLET, FILM COATED ORAL EVERY 6 HOURS
Qty: 12 TABLET | Refills: 0 | Status: SHIPPED | OUTPATIENT
Start: 2022-02-25

## 2022-02-25 RX ORDER — TAMSULOSIN HYDROCHLORIDE 0.4 MG/1
0.4 CAPSULE ORAL ONCE
Status: COMPLETED | OUTPATIENT
Start: 2022-02-25 | End: 2022-02-25

## 2022-02-25 RX ORDER — TAMSULOSIN HYDROCHLORIDE 0.4 MG/1
0.4 CAPSULE ORAL
Qty: 10 CAPSULE | Refills: 0 | Status: SHIPPED | OUTPATIENT
Start: 2022-02-25

## 2022-02-25 RX ADMIN — KETOROLAC TROMETHAMINE 15 MG: 30 INJECTION, SOLUTION INTRAMUSCULAR at 08:47

## 2022-02-25 RX ADMIN — SODIUM CHLORIDE 1000 ML: 0.9 INJECTION, SOLUTION INTRAVENOUS at 08:49

## 2022-02-25 RX ADMIN — HYDROMORPHONE HYDROCHLORIDE 0.5 MG: 1 INJECTION, SOLUTION INTRAMUSCULAR; INTRAVENOUS; SUBCUTANEOUS at 10:55

## 2022-02-25 RX ADMIN — TAMSULOSIN HYDROCHLORIDE 0.4 MG: 0.4 CAPSULE ORAL at 11:08

## 2022-02-25 RX ADMIN — IOHEXOL 100 ML: 350 INJECTION, SOLUTION INTRAVENOUS at 10:23

## 2022-02-25 RX ADMIN — ONDANSETRON 4 MG: 2 INJECTION INTRAMUSCULAR; INTRAVENOUS at 08:47

## 2022-02-25 NOTE — ED PROVIDER NOTES
History  Chief Complaint   Patient presents with    Abdominal Pain     Pt complains of lower right abdominal pain that radiates to her groin and back  Patient is a 67-year-old female with history of breast cancer presents to the emergency department for evaluation right lower quadrant abdominal pain and right flank pain  The patient states the pain started in the middle of the night last night  She states it has been constant since but waxing and waning in intensity  She states that she feels like she is in labor  She denies urinary urgency and frequency but no dysuria  She is currently on her menstrual period  She denies any prior history of kidney stones  She states she did have 1 episode of diarrhea yesterday, and she did have 1 episode of vomiting early this morning  She states she did take some ibuprofen around 2:00 a m  The patient has prior history of cholecystectomy but no other surgeries on her abdomen  She denies any chance of pregnancy as she is not sexually active  She reports that she has been getting sweats and chills, but she does not have a fever she is aware of  She additionally denies chest pain, shortness breath, rash, headache, dizziness or lightheadedness  History provided by:  Patient   used: No    Abdominal Pain  Associated symptoms: diarrhea, nausea and vomiting    Associated symptoms: no chest pain, no chills, no cough, no dysuria, no fever, no hematuria, no shortness of breath and no sore throat        Prior to Admission Medications   Prescriptions Last Dose Informant Patient Reported? Taking?    Biotin 5000 MCG CAPS  Self Yes No   Sig: Take by mouth   DAILY MULTIPLE VITAMINS/IRON TABS  Self Yes No   Sig: Take by mouth   Omega-3 Fatty Acids (fish oil) 1,000 mg   Yes No   Sig: Take 1,000 mg by mouth daily   Probiotic Product (ACIDOPHILUS PROBIOTIC BLEND) CAPS  Self Yes No   Sig: Take by mouth   atorvastatin (LIPITOR) 20 mg tablet   No No   Sig: Take 1 tablet (20 mg total) by mouth daily   cetirizine (ZyrTEC) 10 mg tablet  Self Yes No   Sig: Take 10 mg by mouth daily      Facility-Administered Medications: None       Past Medical History:   Diagnosis Date    Abnormal Pap smear of cervix     Anxiety 01/2015    Last assessed    BRCA gene mutation negative 07/24/2020    [employer] 20 gene panel    Breast cancer ([facility] [address] ) 08/28/2020    left breast DCIS    HPV (human papilloma virus) infection     unsure       Past Surgical History:   Procedure Laterality Date    BREAST BIOPSY Left 07/20/2020    DCIS    BREAST CYST EXCISION Left 08/2020    DCIS    BREAST LUMPECTOMY Left 8/28/2020    Procedure: BREAST NEEDLE LOCALIZED LUMPECTOMY (NEEDLE LOC AT 0900); Surgeon: Alyson Cordon MD;  [facility];  Service: Surgical Oncology    CHOLECYSTECTOMY      CHOLECYSTECTOMY LAPAROSCOPIC  01/2015    Last assessed    COLPOSCOPY      MAMMO NEEDLE LOCALIZATION LEFT (ALL INC) Left 8/28/2020    MAMMO STEREOTACTIC BREAST BIOPSY LEFT ([employer]) Left 7/20/2020    MOUTH SURGERY         Family History   Problem Relation Age of Onset    Heart attack Father         Acute    Heart failure Father     Coronary artery disease Father     Diabetes Father         DM    Hypertension Father     No Known Problems Maternal Grandmother     Alzheimer's disease Maternal Grandfather     Stroke Paternal Grandmother     Prostate cancer Paternal Grandfather 76    No Known Problems Mother     No Known Problems Son     No Known Problems Sister     No Known Problems Half-Sister     No Known Problems Half-Sister     No Known Problems Brother     No Known Problems Maternal Uncle     No Known Problems Maternal Uncle     No Known Problems Maternal Aunt     No Known Problems Maternal Aunt     No Known Problems Maternal Aunt     No Known Problems Paternal Uncle     No Known Problems Paternal Uncle      I have reviewed and agree with the history as documented      E-Cigarette/Vaping    E-Cigarette Use Current Every Day User      E-Cigarette/Vaping Substances    Nicotine Yes     THC No     CBD No     Flavoring No     Other No      Social History     Tobacco Use    Smoking status: Former Smoker     Packs/day: 1 00     Types: Cigarettes     Quit date: 2019     Years since quittin 8    Smokeless tobacco: Never Used   Vaping Use    Vaping Use: Every day    Substances: Nicotine   Substance Use Topics    Alcohol use: Yes     Alcohol/week: 2 0 standard drinks     Types: 2 Standard drinks or equivalent per week    Drug use: Not Currently     Types: Marijuana       Review of Systems   Constitutional: Negative for chills and fever  HENT: Negative for ear pain and sore throat  Eyes: Negative for redness and visual disturbance  Respiratory: Negative for cough and shortness of breath  Cardiovascular: Negative for chest pain  Gastrointestinal: Positive for abdominal pain, diarrhea, nausea and vomiting  Genitourinary: Positive for flank pain, frequency and urgency  Negative for dysuria and hematuria  Musculoskeletal: Negative for back pain, neck pain and neck stiffness  Skin: Negative for color change and rash  Neurological: Negative for dizziness, light-headedness and headaches  All other systems reviewed and are negative  Physical Exam  Physical Exam  Vitals and nursing note reviewed  Constitutional:       General: She is not in acute distress  Appearance: She is well-developed  She is not ill-appearing or toxic-appearing  Comments: Patient appears uncomfortable due to pain but is otherwise in no distress  HENT:      Head: Normocephalic and atraumatic  Mouth/Throat:      Pharynx: Uvula midline  Eyes:      General: Lids are normal       Conjunctiva/sclera: Conjunctivae normal    Cardiovascular:      Rate and Rhythm: Normal rate and regular rhythm  Heart sounds: Normal heart sounds     Pulmonary:      Effort: Pulmonary effort is normal  Breath sounds: Normal breath sounds  Abdominal:      General: There is no distension  Palpations: Abdomen is soft  Tenderness: There is abdominal tenderness in the right lower quadrant  There is right CVA tenderness  Musculoskeletal:      Cervical back: Normal range of motion and neck supple  Skin:     General: Skin is warm and dry  Neurological:      Mental Status: She is alert and oriented to person, place, and time           Vital Signs  ED Triage Vitals   Temperature Pulse Respirations Blood Pressure SpO2   02/25/22 0903 02/25/22 0828 02/25/22 0828 02/25/22 0828 02/25/22 0828   97 7 °F (36 5 °C) 66 18 167/81 99 %      Temp Source Heart Rate Source Patient Position - Orthostatic VS BP Location FiO2 (%)   02/25/22 0903 -- 02/25/22 0828 02/25/22 0828 --   Oral  Lying Right arm       Pain Score       02/25/22 0828       10 - Worst Possible Pain           Vitals:    02/25/22 0828   BP: 167/81   Pulse: 66   Patient Position - Orthostatic VS: Lying         Visual Acuity      ED Medications  Medications   sodium chloride 0 9 % bolus 1,000 mL (0 mL Intravenous Stopped 2/25/22 1056)   ondansetron (ZOFRAN) injection 4 mg (4 mg Intravenous Given 2/25/22 0847)   ketorolac (TORADOL) injection 15 mg (15 mg Intravenous Given 2/25/22 0847)   iohexol (OMNIPAQUE) 350 MG/ML injection (SINGLE-DOSE) 100 mL (100 mL Intravenous Given 2/25/22 1023)   HYDROmorphone (DILAUDID) injection 0 5 mg (0 5 mg Intravenous Given 2/25/22 1055)   tamsulosin (FLOMAX) capsule 0 4 mg (0 4 mg Oral Given 2/25/22 1108)       Diagnostic Studies  Results Reviewed     Procedure Component Value Units Date/Time    Comprehensive metabolic panel [[id number]]  (Abnormal) Collected: 02/25/22 0850    Lab Status: Final result Specimen: Blood from Arm, Left Updated: 02/25/22 0944     Sodium 141 mmol/L      Potassium 4 0 mmol/L      Chloride 103 mmol/L      CO2 24 mmol/L      ANION GAP 14 mmol/L      BUN 14 mg/dL      Creatinine 0 71 mg/dL      Glucose 118 mg/dL      Calcium 9 4 mg/dL      AST 48 U/L      ALT 55 U/L      Alkaline Phosphatase 106 U/L      Total Protein 8 0 g/dL      Albumin 3 8 g/dL      Total Bilirubin 0 45 mg/dL      eGFR 104 ml/min/1 73sq m     Narrative:      National Kidney Disease Foundation guidelines for Chronic Kidney Disease (CKD):     Stage 1 with normal or high GFR (GFR > 90 mL/min/1 73 square meters)    Stage 2 Mild CKD (GFR = 60-89 mL/min/1 73 square meters)    Stage 3A Moderate CKD (GFR = 45-59 mL/min/1 73 square meters)    Stage 3B Moderate CKD (GFR = 30-44 mL/min/1 73 square meters)    Stage 4 Severe CKD (GFR = 15-29 mL/min/1 73 square meters)    Stage 5 End Stage CKD (GFR <15 mL/min/1 73 square meters)  Note: GFR calculation is accurate only with a steady state creatinine    Lipase [[id number]]  (Normal) Collected: 02/25/22 0850    Lab Status: Final result Specimen: Blood from Arm, Left Updated: 02/25/22 0944     Lipase 74 u/L     Urine Microscopic [[id number]]  (Abnormal) Collected: 02/25/22 0900    Lab Status: Final result Specimen: Urine, Clean Catch Updated: 02/25/22 0938     RBC, UA 20-30 /hpf      WBC, UA 0-1 /hpf      Epithelial Cells Occasional /hpf      Bacteria, UA Occasional /hpf      AMORPH PHOSPATES Occasional /hpf     CBC and differential [[id number]]  (Abnormal) Collected: 02/25/22 0850    Lab Status: Final result Specimen: Blood from Arm, Left Updated: 02/25/22 0930     WBC 11 93 Thousand/uL      RBC 4 08 Million/uL      Hemoglobin 12 4 g/dL      Hematocrit 37 4 %      MCV 92 fL      MCH 30 4 pg      MCHC 33 2 g/dL      RDW 13 4 %      MPV 9 8 fL      Platelets 547 Thousands/uL      nRBC 0 /100 WBCs      Neutrophils Relative 74 %      Immat GRANS % 1 %      Lymphocytes Relative 17 %      Monocytes Relative 5 %      Eosinophils Relative 2 %      Basophils Relative 1 %      Neutrophils Absolute 9 03 Thousands/µL      Immature Grans Absolute 0 06 Thousand/uL      Lymphocytes Absolute 1 98 Thousands/µL Monocytes Absolute 0 58 Thousand/µL      Eosinophils Absolute 0 21 Thousand/µL      Basophils Absolute 0 07 Thousands/µL     POCT pregnancy, urine [[id number]]  (Normal) Resulted: 02/25/22 0903    Lab Status: Final result Updated: 02/25/22 0903     EXT PREG TEST UR (Ref: Negative) Negative     Control Valid    Urine Macroscopic, POC [[id number]]  (Abnormal) Collected: 02/25/22 0900    Lab Status: Final result Specimen: Urine Updated: 02/25/22 0902     Color, UA Qi     Clarity, UA Slightly Cloudy     pH, UA 7 0     Leukocytes, UA Negative     Nitrite, UA Negative     Protein,  (2+) mg/dl      Glucose, UA Negative mg/dl      Ketones, UA Negative mg/dl      Urobilinogen, UA 0 2 E U /dl      Bilirubin, UA Negative     Blood, UA Large     Specific Gravity, UA 1 025    Narrative:      CLINITEK RESULT                 CT abdomen pelvis with contrast   Final Result by Deborah Narayanan DO (02/25 1038)      Moderate right hydronephrosis secondary to an obstructing 0 2 cm calculus at the right ureterovesicular junction  Hepatomegaly and hepatic steatosis  Left adrenal nodule measuring up to 1 6 cm  Although its imaging features are indeterminate, it does not have suspicious imaging features (heterogeneity, necrosis, irregular margins), therefore this is likely benign, and can be followed by non-contrast    abdomen CT or MRI in 12 months  If patient has history of adrenal hyperfunction, consider biochemical evaluation  Adrenal recommendation based on institutional consensus and Journal of [employer] of Radiology 2017;14:9817-0653         The study was marked in [facility] for immediate notification  Workstation performed: [id number]                    Procedures  Procedures         ED Course  ED Course as of 02/25/22 1727   Fri Feb 25, 2022   8722 Patient reports feeling significantly better after Toradol  She is resting comfortably at this time     1134 Discussed all results of CT with patient including incidental findings  MDM  Number of Diagnoses or Management Options  Adrenal nodule [facility]): new and requires workup  Kidney stone: new and requires workup  Diagnosis management comments: Patient presents for evaluation of right-sided abdominal pain and right-sided flank pain that started early this morning  Differential includes but is not limited to pyelonephritis versus kidney stone versus appendicitis versus ovarian cyst versus ovarian torsion versus bowel obstruction  Labs, imaging and meds were ordered  Labs reviewed with no significant findings other than hematuria  CT does show a 2 mm stone at the right UVJ with moderate right-sided hydronephrosis  Results were discussed with the patient  Patient initially got significant relief with Toradol, however pain did return  She was given 1 dose of Dilaudid, which also gave her significant relief  She is drinking fluids in the ED  I feel she is appropriate for outpatient medical expulsive treatment at this time  She was given her 1st dose of Flomax in the ED  Patient was sent home with a prescription for Flomax, Percocet and Zofran  I advised that she try to manage pain with Tylenol and Motrin 1st, however if pain is too severe, she can take the Percocet  I advised that if she has ultimately unable to control her pain at home, she should return to the ED  I also advised return to the ED with any fevers or inability to tolerate p o  Intake  She acknowledged understanding  Patient was advised to follow up with Urology, and she was provided with information for Urology  She was also provided with materials to collect the stone if possible  Patient is stable for discharge         Amount and/or Complexity of Data Reviewed  Clinical lab tests: ordered and reviewed  Tests in the radiology section of CPT®: ordered and reviewed  Decide to obtain previous medical records or to obtain history from someone other than the patient: yes  Review and summarize past medical records: yes    Risk of Complications, Morbidity, and/or Mortality  Presenting problems: low  Diagnostic procedures: low  Management options: low    Patient Progress  Patient progress: stable      Disposition  Final diagnoses:   Kidney stone   Adrenal nodule ([address] )     Time reflects when diagnosis was documented in both MDM as applicable and the Disposition within this note     Time User Action Codes Description Comment    2/25/2022 11:41 AM Leeroy Mason Add [N20 0] Kidney stone     2/25/2022 11:42 AM Leeroy Mason Add [E27 8] Adrenal nodule [facility])       ED Disposition     ED Disposition Condition Date/Time Comment    Discharge Stable Fri Feb 25, 2022 11:41 AM [address] discharge to home/self care  Follow-up Information     Follow up With Specialties Details Why Contact Info Additional Information    Valente Sylvester PA-C Internal Medicine, Physician Assistant Schedule an appointment as soon as possible for a visit  As needed  E   [facility]  [address]  [address] [facility]  [address] [facility] Urology Schedule an appointment as soon as possible for a visit in 1 week  [address] [phone]  [phone] [facility],  [address] [facility], [place], [place], [address] [address] Emergency Department Emergency Medicine  If symptoms worsen [address] Emergency Department, [address], [place], [place], [address]          Discharge Medication List as of 2/25/2022 11:51 AM      START taking these medications    Details   ondansetron (ZOFRAN) 4 mg tablet Take 1 tablet (4 mg total) by mouth every 6 (six) hours, Starting Fri 2/25/2022, Normal      oxyCODONE-acetaminophen (Percocet) 5-325 mg per tablet Take 1 tablet by mouth every 8 (eight) hours as needed for moderate pain for up to 10 days Max Daily Amount: 3 tablets, Starting Fri 2/25/2022, Until Mon 3/7/2022 at 2359, Normal      tamsulosin (FLOMAX) 0 4 mg Take 1 capsule (0 4 mg total) by mouth daily with dinner, Starting Fri 2/25/2022, Normal         CONTINUE these medications which have NOT CHANGED    Details   atorvastatin (LIPITOR) 20 mg tablet Take 1 tablet (20 mg total) by mouth daily, Starting Mon 9/20/2021, Until Sat 3/19/2022, Normal      Biotin 5000 MCG CAPS Take by mouth, Historical Med      cetirizine (ZyrTEC) 10 mg tablet Take 10 mg by mouth daily, Historical Med      DAILY MULTIPLE VITAMINS/IRON TABS Take by mouth, Historical Med      Omega-3 Fatty Acids (fish oil) 1,000 mg Take 1,000 mg by mouth daily, Historical Med      Probiotic Product (ACIDOPHILUS PROBIOTIC BLEND) CAPS Take by mouth, Historical Med             No discharge procedures on file      PDMP Review       Value Time User    PDMP Reviewed  Yes 8/28/2020 11:13 AM Ernie Crenshaw MD          ED Provider  Electronically Signed by           Virgilio Plok PA-C  02/25/22 [id number]

## 2022-02-25 NOTE — Clinical Note
Maria Del Rosario Rodney was seen and treated in our emergency department on 2/25/2022  Diagnosis:     Yudi Fuller  may return to work on return date  She may return on this date: 03/02/2022         If you have any questions or concerns, please don't hesitate to call        Arabella Frank PA-C    ______________________________           _______________          _______________  Hospital Representative                              Date                                Time

## 2022-03-25 ENCOUNTER — TELEPHONE (OUTPATIENT)
Dept: SURGICAL ONCOLOGY | Facility: CLINIC | Age: 42
End: 2022-03-25

## 2022-03-25 NOTE — TELEPHONE ENCOUNTER
Called patient and rescheduled cancelled appt for 3/31/22 with Sergio Rees, rescheduled patient to 5/9/22 with Dayan Vazquez at [facility]

## 2022-05-09 ENCOUNTER — OFFICE VISIT (OUTPATIENT)
Dept: SURGICAL ONCOLOGY | Facility: CLINIC | Age: 42
End: 2022-05-09
Payer: MEDICARE

## 2022-05-09 VITALS
HEIGHT: 66 IN | TEMPERATURE: 98.9 F | RESPIRATION RATE: 18 BRPM | DIASTOLIC BLOOD PRESSURE: 100 MMHG | BODY MASS INDEX: 38.57 KG/M2 | OXYGEN SATURATION: 97 % | HEART RATE: 85 BPM | WEIGHT: 240 LBS | SYSTOLIC BLOOD PRESSURE: 162 MMHG

## 2022-05-09 DIAGNOSIS — Z86.000 HISTORY OF DUCTAL CARCINOMA IN SITU (DCIS) OF BREAST: ICD-10-CM

## 2022-05-09 DIAGNOSIS — Z08 ENCOUNTER FOR FOLLOW-UP EXAMINATION AFTER COMPLETED TREATMENT FOR MALIGNANT NEOPLASM: Primary | ICD-10-CM

## 2022-05-09 PROCEDURE — 99213 OFFICE O/P EST LOW 20 MIN: CPT

## 2022-05-09 NOTE — PROGRESS NOTES
Surgical Oncology Follow Up       [address] [facility] SURGICAL ONCOLOGY [place]  [address] [facility] [address]    Naif Heathil  1980  [id number]  [address] [facility] SURGICAL ONCOLOGY [place]  [address] [address]    Chief Complaint   Patient presents with    Follow-up     6 Month       Assessment/Plan:  1  Encounter for follow-up examination after completed treatment for malignant neoplasm  - 6 month follow up  - mammo scheduled for July 2022    2  History of ductal carcinoma in situ (DCIS) of breast       Discussion/Summary:  Patient is a 23-year-old female presenting today for six-month follow-up for left breast cancer diagnosed in July 2020  Pathology 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, %, %  She underwent genetic testing which was negative  She had a left breast needle localized lumpectomy with Dr Kimberly Liriano  She was low risk on DCISion RT so she did not receive adjuvant radiation therapy  She had a consult with Medical Oncology, however patient denied tamoxifen  She has a diagnostic bilateral mammogram ordered for July of this year  There are no concerns on her clinical breast exam   Patient expressed concerns over weight gain however she is not interested in medical weight management  I briefly discussed portion control as well as getting daily exercise  She also told me she has been having long, intense menstrual cycles and experiencing pre-menopausal symptoms  I told her to reach out to her OBGYN  Additionally, she had an elevated BP of 162/100  Patient states she gets very anxious coming here, but her and her PCP have been monitoring her bp for months  She stated they discussed putting her on medication but decided to hold off  I instructed her to give her PCP a call to inform them that her bp is still elevated  I will see the patient back in 6 months or sooner should the need arise  She was instructed to call with any questions or concerns prior to this time  All questions were answered today  History of Present Illness:     Oncology History   History of ductal carcinoma in situ (DCIS) of breast   7/20/2020 Biopsy    Left breast stereotactic biopsy  11 o'clock, anterior depth  Ductal carcinoma in situ  Grade 2        Concordant  Malignancy appears unifocal  Calcifications spanned 5 mm  Right breast clear  7/24/2020 Genetic Testing    The following genes were evaluated: AARON, BRCA1, BRCA2, CDH1, CHEK2, PALB2, PTEN, STK11, TP53  Additional genes were analyzed for a total of 20 genes  Negative result  No pathogenic sequence variants or deletions/dupllications identified  [employer]     8/28/2020 Surgery    Left breast needle localized lumpectomy  Ductal carcinoma in situ  Grade 2  Size cannot be determined (5/34 blocks)  Margins negative  Stage 0     9/14/2020 Genomic Testing    DCISion RT low risk  10 year total risk with breast conserving surgery alone 7%  10 year total risk with breast conserving surgery and radiation 7%     10/27/2020 - 10/27/2020 Hormone Therapy    Consult with Dr Kathryn Mcmanus Tamoxifen          -Interval History: Patient is a 49-year-old female presenting today for six-month follow-up for left breast cancer diagnosed in July 2020  She is currently on observation  Patient denies changes on her breast exam  She denies persistent headaches, bone pain, back pain, shortness of breath, or abdominal pain  Review of Systems:  Review of Systems   Constitutional: Negative for activity change, appetite change, fatigue and unexpected weight change  Respiratory: Negative for cough and shortness of breath  Cardiovascular: Negative for chest pain  Gastrointestinal: Negative for abdominal pain, diarrhea, nausea and vomiting  Endocrine: Negative for heat intolerance  Musculoskeletal: Negative for arthralgias, back pain and myalgias     Skin: Negative for rash    Neurological: Negative for weakness and headaches  Hematological: Negative for adenopathy  Patient Active Problem List   Diagnosis    Anxiety    History of ductal carcinoma in situ (DCIS) of breast    Class 2 obesity due to excess calories without serious comorbidity with body mass index (BMI) of 39 0 to 39 9 in adult    Pure hypercholesterolemia    Gastroesophageal reflux disease    Encounter for follow-up examination after completed treatment for malignant neoplasm    Hepatic steatosis    Thrombocytosis     Past Medical History:   Diagnosis Date    Abnormal Pap smear of cervix     Anxiety 01/2015    Last assessed    BRCA gene mutation negative 07/24/2020    [employer] 20 gene panel    Breast cancer ([facility] [address] ) 08/28/2020    left breast DCIS    HPV (human papilloma virus) infection     unsure     Past Surgical History:   Procedure Laterality Date    BREAST BIOPSY Left 07/20/2020    DCIS    BREAST CYST EXCISION Left 08/2020    DCIS    BREAST LUMPECTOMY Left 8/28/2020    Procedure: BREAST NEEDLE LOCALIZED LUMPECTOMY (NEEDLE LOC AT 0900);   Surgeon: Victor Hugo Huffman MD;  [facility];  Service: Surgical Oncology    CHOLECYSTECTOMY      CHOLECYSTECTOMY LAPAROSCOPIC  01/2015    Last assessed    COLPOSCOPY      MAMMO NEEDLE LOCALIZATION LEFT (ALL INC) Left 8/28/2020    MAMMO STEREOTACTIC BREAST BIOPSY LEFT (ALL INC) Left 7/20/2020    MOUTH SURGERY       Family History   Problem Relation Age of Onset    Heart attack Father         Acute    Heart failure Father     Coronary artery disease Father     Diabetes Father         DM    Hypertension Father     No Known Problems Maternal Grandmother     Alzheimer's disease Maternal Grandfather     Stroke Paternal Grandmother     Prostate cancer Paternal Grandfather 76    Cancer Paternal Grandfather     No Known Problems Mother     No Known Problems Son     No Known Problems Sister     No Known Problems Half-Sister     No Known Problems Half-Sister     No Known Problems Brother     No Known Problems Maternal Uncle     No Known Problems Maternal Uncle     No Known Problems Maternal Aunt     No Known Problems Maternal Aunt     No Known Problems Maternal Aunt     No Known Problems Paternal Uncle     No Known Problems Paternal Uncle      Social History     Socioeconomic History    Marital status: Single     Spouse name: Not on file    Number of children: Not on file    Years of education: Not on file    Highest education level: Not on file   Occupational History    Not on file   Tobacco Use    Smoking status: Former Smoker     Packs/day: 1 00     Years: 25 00     Pack years: 25 00     Types: Cigarettes     Quit date: 4/8/2019     Years since quitting: 3 0    Smokeless tobacco: Never Used   Vaping Use    Vaping Use: Every day    Substances: Nicotine   Substance and Sexual Activity    Alcohol use: Not Currently     Alcohol/week: 0 0 standard drinks    Drug use: Not Currently     Types: Marijuana    Sexual activity: Not Currently     Partners: Male     Birth control/protection: Abstinence, None   Other Topics Concern    Not on file   Social History Narrative    Caffeine use, Does not exercise, Full-time work, One child, Single,      Social Determinants of Health     Financial Resource Strain: Not on file   Food Insecurity: Not on file   Transportation Needs: Not on file   Physical Activity: Not on file   Stress: Not on file   Social Connections: Not on file   Intimate Partner Violence: Not on file   Housing Stability: Not on file       Current Outpatient Medications:     atorvastatin (LIPITOR) 20 mg tablet, TAKE 1 TABLET BY MOUTH EVERY DAY, Disp: 90 tablet, Rfl: 3    Biotin 5000 MCG CAPS, Take by mouth, Disp: , Rfl:     cetirizine (ZyrTEC) 10 mg tablet, Take 10 mg by mouth daily, Disp: , Rfl:     DAILY MULTIPLE VITAMINS/IRON TABS, Take by mouth, Disp: , Rfl:     Omega-3 Fatty Acids (fish oil) 1,000 mg, Take 1,000 mg by mouth daily, Disp: , Rfl:     ondansetron (ZOFRAN) 4 mg tablet, Take 1 tablet (4 mg total) by mouth every 6 (six) hours, Disp: 12 tablet, Rfl: 0    Probiotic Product (ACIDOPHILUS PROBIOTIC BLEND) CAPS, Take by mouth, Disp: , Rfl:     tamsulosin (FLOMAX) 0 4 mg, Take 1 capsule (0 4 mg total) by mouth daily with dinner, Disp: 10 capsule, Rfl: 0  No Known Allergies  Vitals:    05/09/22 1514   Resp: 18       Physical Exam  Constitutional:       General: She is not in acute distress  Appearance: Normal appearance  She is obese  Cardiovascular:      Rate and Rhythm: Normal rate and regular rhythm  Pulses: Normal pulses  Heart sounds: Normal heart sounds  Pulmonary:      Effort: Pulmonary effort is normal       Breath sounds: Normal breath sounds  Chest:      Chest wall: No mass  Breasts:      Right: No swelling, bleeding, inverted nipple, mass, nipple discharge, skin change, tenderness, axillary adenopathy or supraclavicular adenopathy  Left: No swelling, bleeding, inverted nipple, mass, nipple discharge, skin change, tenderness, axillary adenopathy or supraclavicular adenopathy  Comments: Left breast lumpectomy scar  No masses, nodularity, skin changes, nipple changes or discharge, or adenopathy appreciated on physical exam    Left breast lumpectomy scar  Abdominal:      General: Abdomen is flat  Palpations: Abdomen is soft  Lymphadenopathy:      Upper Body:      Right upper body: No supraclavicular, axillary or pectoral adenopathy  Left upper body: No supraclavicular, axillary or pectoral adenopathy  Skin:     General: Skin is warm  Neurological:      General: No focal deficit present  Mental Status: She is alert and oriented to person, place, and time  Psychiatric:         Mood and Affect: Mood normal          Behavior: Behavior normal            Results:    Imaging  No results found  I reviewed the above imaging data        Advance Care Planning/Advance Directives:  Discussed disease status, cancer treatment plans and/or cancer treatment goals with the patient

## 2022-06-07 ENCOUNTER — OFFICE VISIT (OUTPATIENT)
Dept: OBGYN CLINIC | Facility: CLINIC | Age: 42
End: 2022-06-07

## 2022-06-07 VITALS
SYSTOLIC BLOOD PRESSURE: 136 MMHG | HEIGHT: 66 IN | HEART RATE: 69 BPM | WEIGHT: 246 LBS | BODY MASS INDEX: 39.53 KG/M2 | RESPIRATION RATE: 18 BRPM | DIASTOLIC BLOOD PRESSURE: 79 MMHG

## 2022-06-07 DIAGNOSIS — Z01.419 WOMEN'S ANNUAL ROUTINE GYNECOLOGICAL EXAMINATION: Primary | ICD-10-CM

## 2022-06-07 DIAGNOSIS — Z86.000 HISTORY OF DUCTAL CARCINOMA IN SITU (DCIS) OF BREAST: ICD-10-CM

## 2022-06-07 DIAGNOSIS — Z08 ENCOUNTER FOR FOLLOW-UP EXAMINATION AFTER COMPLETED TREATMENT FOR MALIGNANT NEOPLASM: ICD-10-CM

## 2022-06-07 DIAGNOSIS — N92.0 MENORRHAGIA WITH REGULAR CYCLE: ICD-10-CM

## 2022-06-07 PROCEDURE — 99396 PREV VISIT EST AGE 40-64: CPT | Performed by: OBSTETRICS & GYNECOLOGY

## 2022-06-07 RX ORDER — TRANEXAMIC ACID 650 1/1
1300 TABLET ORAL 3 TIMES DAILY
Qty: 30 TABLET | Refills: 3 | Status: SHIPPED | OUTPATIENT
Start: 2022-06-07 | End: 2022-06-12

## 2022-06-07 NOTE — PROGRESS NOTES
ASSESSMENT & PLAN:   Diagnoses and all orders for this visit:    Women's annual routine gynecological examination    Encounter for follow-up examination after completed treatment for malignant neoplasm    History of ductal carcinoma in situ (DCIS) of breast    Menorrhagia with regular cycle  -     US pelvis complete w transvaginal; Future  -     TSH, 3rd generation with Free T4 reflex; Future  -     CBC; Future  -     Tranexamic Acid 650 MG TABS; Take 2 tablets (1,300 mg total) by mouth 3 (three) times a day for 5 days          The following were reviewed in today's visit: ASCCP guidelines, Gardisil vaccination, STD testing breast self exam, mammography screening ordered, menopause, exercise and healthy diet  Patient to return to office in yearly for annual exam      All questions have been answered to her satisfaction  CC:  Annual Gynecologic Examination  Chief Complaint   Patient presents with    Gynecologic Exam       HPI: Heaven Kamara is a 38 y o  Darrell Samuel who presents for annual gynecologic examination  She has the following concerns:  Heavy monthly periods that last 10-14 days, no spotting      Health Maintenance:    Exercise: intermittently  Breast exams/breast awareness: yes  Diet: well balanced diet  Last mammogram: 2021      Past Medical History:   Diagnosis Date    Abnormal Pap smear of cervix     Anxiety 01/2015    Last assessed    BRCA gene mutation negative 07/24/2020    [employer] 20 gene panel    Breast cancer ([facility] [address] ) 08/28/2020    left breast DCIS    HPV (human papilloma virus) infection     unsure       Past Surgical History:   Procedure Laterality Date    BREAST BIOPSY Left 07/20/2020    DCIS    BREAST CYST EXCISION Left 08/2020    DCIS    BREAST LUMPECTOMY Left 8/28/2020    Procedure: BREAST NEEDLE LOCALIZED LUMPECTOMY (NEEDLE LOC AT 0900);   Surgeon: Evette Oliveira MD;  [facility];  Service: Surgical Oncology    CHOLECYSTECTOMY      CHOLECYSTECTOMY LAPAROSCOPIC  01/2015 Last assessed    COLPOSCOPY      MAMMO NEEDLE LOCALIZATION LEFT (ALL INC) Left 8/28/2020    MAMMO STEREOTACTIC BREAST BIOPSY LEFT (ALL INC) Left 7/20/2020    MOUTH SURGERY         Past OB/Gyn History:  Period Duration (Days): 10  Period Pattern: (!) Irregular  Menstrual Flow: Moderate  Menstrual Control: Maxi pad  Dysmenorrhea: (!) Mild  Dysmenorrhea Symptoms: CrampingPatient's last menstrual period was 05/14/2022 (exact date)  Last Pap: 2020 : no abnormalities  History of abnormal Pap smear: No  HPV vaccine completed: Yes     Patient is not currently sexually active     STD testing: no  Current contraception: none      Family History  Family History   Problem Relation Age of Onset    Heart attack Father         Acute    Heart failure Father     Coronary artery disease Father     Diabetes Father         DM    Hypertension Father     No Known Problems Maternal Grandmother     Alzheimer's disease Maternal Grandfather     Stroke Paternal Grandmother     Prostate cancer Paternal Grandfather 76    Cancer Paternal Grandfather     No Known Problems Mother     No Known Problems Son     No Known Problems Sister     No Known Problems Half-Sister     No Known Problems Half-Sister     No Known Problems Brother     No Known Problems Maternal Uncle     No Known Problems Maternal Uncle     No Known Problems Maternal Aunt     No Known Problems Maternal Aunt     No Known Problems Maternal Aunt     No Known Problems Paternal Uncle     No Known Problems Paternal Uncle        Family history of uterine or ovarian cancer: no  Family history of breast cancer: no  Family history of colon cancer: no    Social History:  Social History     Socioeconomic History    Marital status: Single     Spouse name: Not on file    Number of children: Not on file    Years of education: Not on file    Highest education level: Not on file   Occupational History    Not on file   Tobacco Use    Smoking status: Former Smoker Packs/day: 1 00     Years: 25 00     Pack years: 25 00     Types: Cigarettes     Quit date: 4/8/2019     Years since quitting: 3 1    Smokeless tobacco: Never Used   Vaping Use    Vaping Use: Every day    Substances: Nicotine   Substance and Sexual Activity    Alcohol use: Not Currently     Alcohol/week: 0 0 standard drinks    Drug use: Not Currently     Types: Marijuana    Sexual activity: Not Currently     Partners: Male     Birth control/protection: Abstinence, None   Other Topics Concern    Not on file   Social History Narrative    Caffeine use, Does not exercise, Full-time work, One child, Single,      Social Determinants of Health     Financial Resource Strain: Low Risk     Difficulty of Paying Living Expenses: Not hard at all   Food Insecurity: No Food Insecurity    Worried About [address] in the Last Year: Never true    [address] in the Last Year: Never true   Transportation Needs: No Transportation Needs    Lack of Transportation (Medical): No    Lack of Transportation (Non-Medical): No   Physical Activity: Inactive    Days of Exercise per Week: 0 days    Minutes of Exercise per Session: 0 min   Stress: Stress Concern Present    Feeling of Stress :  To some extent   Social Connections: Socially Isolated    Frequency of Communication with Friends and Family: More than three times a week    Frequency of Social Gatherings with Friends and Family: More than three times a week    Attends Tenriism Services: Never    Active Member of Clubs or Organizations: No    Attends Club or Organization Meetings: Never    Marital Status: Never    Intimate Partner Violence: Not At Risk    Fear of Current or Ex-Partner: No    Emotionally Abused: No    Physically Abused: No    Sexually Abused: No   Housing Stability: [address] Unable to Pay for Housing in the Last Year: No    Number of [place] in the Last Year: 1    Unstable Housing in the Last Year: No     Domestic violence screen: negative      Allergies: Allergies   Allergen Reactions    Seasonal Ic [Cholestatin] Cough       Medications:    Current Outpatient Medications:     atorvastatin (LIPITOR) 20 mg tablet, TAKE 1 TABLET BY MOUTH EVERY DAY, Disp: 90 tablet, Rfl: 3    Biotin 5000 MCG CAPS, Take by mouth, Disp: , Rfl:     cetirizine (ZyrTEC) 10 mg tablet, Take 10 mg by mouth daily, Disp: , Rfl:     DAILY MULTIPLE VITAMINS/IRON TABS, Take by mouth, Disp: , Rfl:     Omega-3 Fatty Acids (fish oil) 1,000 mg, Take 1,000 mg by mouth daily, Disp: , Rfl:     Probiotic Product (ACIDOPHILUS PROBIOTIC BLEND) CAPS, Take by mouth, Disp: , Rfl:     tamsulosin (FLOMAX) 0 4 mg, Take 1 capsule (0 4 mg total) by mouth daily with dinner, Disp: 10 capsule, Rfl: 0    Tranexamic Acid 650 MG TABS, Take 2 tablets (1,300 mg total) by mouth 3 (three) times a day for 5 days, Disp: 30 tablet, Rfl: 3    ondansetron (ZOFRAN) 4 mg tablet, Take 1 tablet (4 mg total) by mouth every 6 (six) hours (Patient not taking: Reported on 6/7/2022), Disp: 12 tablet, Rfl: 0    Review of Systems:  Review of Systems   Constitutional: Negative  HENT: Negative  Respiratory: Negative  Cardiovascular: Negative  Gastrointestinal: Negative  Genitourinary: Positive for menstrual problem  Negative for pelvic pain, vaginal bleeding, vaginal discharge and vaginal pain  Neurological: Negative  Psychiatric/Behavioral: Negative  Physical Exam:  /79 (BP Location: Left arm, Patient Position: Standing, Cuff Size: Adult)   Pulse 69   Resp 18   Ht 5' 6" (1 676 m)   Wt 112 kg (246 lb)   LMP 05/14/2022 (Exact Date)   BMI 39 71 kg/m²    Physical Exam  Constitutional:       Appearance: Normal appearance  Genitourinary:      Bladder and urethral meatus normal       No lesions in the vagina        Right Labia: No rash, tenderness, lesions, skin changes or Bartholin's cyst      Left Labia: No tenderness, lesions, skin changes, Bartholin's cyst or rash  No vaginal erythema, tenderness or bleeding  Right Adnexa: not tender, not full and no mass present  Left Adnexa: not tender, not full and no mass present  Cervix is parous  No cervical motion tenderness, discharge, lesion or polyp  Uterus is not enlarged, fixed or tender  No uterine mass detected  Urethral meatus caruncle not present  No urethral tenderness or mass present  Breasts:      Right: No swelling, bleeding, inverted nipple, mass, nipple discharge, skin change or tenderness  Left: No swelling, bleeding, inverted nipple, mass, nipple discharge, skin change or tenderness  HENT:      Head: Normocephalic and atraumatic  Eyes:      Extraocular Movements: Extraocular movements intact  Conjunctiva/sclera: Conjunctivae normal       Pupils: Pupils are equal, round, and reactive to light  Cardiovascular:      Rate and Rhythm: Normal rate and regular rhythm  Heart sounds: Normal heart sounds  No murmur heard  Pulmonary:      Effort: Pulmonary effort is normal  No respiratory distress  Breath sounds: Normal breath sounds  No wheezing or rales  Abdominal:      General: There is no distension  Palpations: Abdomen is soft  Tenderness: There is no abdominal tenderness  There is no guarding  Neurological:      General: No focal deficit present  Mental Status: She is alert and oriented to person, place, and time  Skin:     General: Skin is warm and dry  Psychiatric:         Mood and Affect: Mood normal          Behavior: Behavior normal    Vitals and nursing note reviewed

## 2022-06-15 ENCOUNTER — HOSPITAL ENCOUNTER (OUTPATIENT)
Dept: ULTRASOUND IMAGING | Facility: HOSPITAL | Age: 42
Discharge: HOME | End: 2022-06-15
Attending: OBSTETRICS & GYNECOLOGY
Payer: MEDICARE

## 2022-06-15 DIAGNOSIS — N92.0 MENORRHAGIA WITH REGULAR CYCLE: ICD-10-CM

## 2022-06-15 PROCEDURE — 76830 TRANSVAGINAL US NON-OB: CPT

## 2022-06-15 PROCEDURE — 76856 US EXAM PELVIC COMPLETE: CPT

## 2022-07-27 ENCOUNTER — HOSPITAL ENCOUNTER (OUTPATIENT)
Dept: MAMMOGRAPHY | Facility: CLINIC | Age: 42
Discharge: HOME | End: 2022-07-27
Payer: MEDICARE

## 2022-07-27 DIAGNOSIS — R92.8 ABNORMAL MAMMOGRAM: ICD-10-CM

## 2022-07-27 PROCEDURE — G0279 TOMOSYNTHESIS, MAMMO: HCPCS

## 2022-07-27 PROCEDURE — 77066 DX MAMMO INCL CAD BI: CPT

## 2022-08-07 ENCOUNTER — APPOINTMENT (OUTPATIENT)
Dept: LAB | Facility: CLINIC | Age: 42
End: 2022-08-07
Payer: MEDICARE

## 2022-08-07 DIAGNOSIS — E78.00 PURE HYPERCHOLESTEROLEMIA: Chronic | ICD-10-CM

## 2022-08-07 DIAGNOSIS — N92.0 MENORRHAGIA WITH REGULAR CYCLE: ICD-10-CM

## 2022-08-07 LAB
ALBUMIN SERPL BCP-MCNC: 4.5 G/DL (ref 3.5–5)
ALP SERPL-CCNC: 99 U/L (ref 34–104)
ALT SERPL W P-5'-P-CCNC: 45 U/L (ref 7–52)
ANION GAP SERPL CALCULATED.3IONS-SCNC: 8 MMOL/L (ref 4–13)
AST SERPL W P-5'-P-CCNC: 44 U/L (ref 13–39)
BILIRUB SERPL-MCNC: 0.69 MG/DL (ref 0.2–1)
BUN SERPL-MCNC: 11 MG/DL (ref 5–25)
CALCIUM SERPL-MCNC: 9.3 MG/DL (ref 8.4–10.2)
CHLORIDE SERPL-SCNC: 104 MMOL/L (ref 96–108)
CHOLEST SERPL-MCNC: 158 MG/DL
CO2 SERPL-SCNC: 25 MMOL/L (ref 21–32)
CREAT SERPL-MCNC: 0.53 MG/DL (ref 0.6–1.3)
ERYTHROCYTE [DISTWIDTH] IN BLOOD BY AUTOMATED COUNT: 14.6 % (ref 11.6–15.1)
GFR SERPL CREATININE-BSD FRML MDRD: 115 ML/MIN/1.73SQ M
GLUCOSE P FAST SERPL-MCNC: 95 MG/DL (ref 65–99)
HCT VFR BLD AUTO: 40 % (ref 34.8–46.1)
HDLC SERPL-MCNC: 73 MG/DL
HGB BLD-MCNC: 13.2 G/DL (ref 11.5–15.4)
LDLC SERPL CALC-MCNC: 72 MG/DL (ref 0–100)
MCH RBC QN AUTO: 29.8 PG (ref 26.8–34.3)
MCHC RBC AUTO-ENTMCNC: 33 G/DL (ref 31.4–37.4)
MCV RBC AUTO: 90 FL (ref 82–98)
PLATELET # BLD AUTO: 404 THOUSANDS/UL (ref 149–390)
PMV BLD AUTO: 9.6 FL (ref 8.9–12.7)
POTASSIUM SERPL-SCNC: 4.6 MMOL/L (ref 3.5–5.3)
PROT SERPL-MCNC: 7.7 G/DL (ref 6.4–8.4)
RBC # BLD AUTO: 4.43 MILLION/UL (ref 3.81–5.12)
SODIUM SERPL-SCNC: 137 MMOL/L (ref 135–147)
TRIGL SERPL-MCNC: 66 MG/DL
TSH SERPL DL<=0.05 MIU/L-ACNC: 3.86 UIU/ML (ref 0.45–4.5)
WBC # BLD AUTO: 10.1 THOUSAND/UL (ref 4.31–10.16)

## 2022-08-07 PROCEDURE — 80061 LIPID PANEL: CPT

## 2022-08-07 PROCEDURE — 80053 COMPREHEN METABOLIC PANEL: CPT

## 2022-08-07 PROCEDURE — 36415 COLL VENOUS BLD VENIPUNCTURE: CPT

## 2022-08-07 PROCEDURE — 85027 COMPLETE CBC AUTOMATED: CPT

## 2022-08-07 PROCEDURE — 84443 ASSAY THYROID STIM HORMONE: CPT

## 2022-10-10 ENCOUNTER — TELEPHONE (OUTPATIENT)
Dept: INTERNAL MEDICINE CLINIC | Facility: CLINIC | Age: 42
End: 2022-10-10

## 2022-10-10 ENCOUNTER — OFFICE VISIT (OUTPATIENT)
Dept: INTERNAL MEDICINE CLINIC | Facility: CLINIC | Age: 42
End: 2022-10-10

## 2022-10-10 DIAGNOSIS — Z00.00 ANNUAL PHYSICAL EXAM: Primary | ICD-10-CM

## 2022-10-10 DIAGNOSIS — Z11.1 TUBERCULOSIS SCREENING: ICD-10-CM

## 2022-10-10 PROCEDURE — 99396 PREV VISIT EST AGE 40-64: CPT | Performed by: PHYSICIAN ASSISTANT

## 2022-10-10 PROCEDURE — 86580 TB INTRADERMAL TEST: CPT | Performed by: PHYSICIAN ASSISTANT

## 2022-10-10 RX ORDER — TRANEXAMIC ACID 650 MG/1
TABLET ORAL
COMMUNITY
Start: 2022-09-07

## 2022-10-10 NOTE — TELEPHONE ENCOUNTER
Folder Color- PURPLE    Name of Form- Staff Health Appraisal    Form to be filled out by- Chase Patel PA-C    Form to be given to patient    Patient made aware of 10 business day policy

## 2022-10-10 NOTE — TELEPHONE ENCOUNTER
Patient is here today for PPD placement  PPD placed in left lower forearm  Patient does have paperwork to be completed  Form can be found in the purple bin  TB test was placed on 10/10/2022 at 3:30pm      Patient is aware to return for TB test reading on 10/12/2022  after 3:30pm (or) 10/13/2022 before 3:30am      Patient was provided a TB appointment reminder with this information

## 2022-10-10 NOTE — PROGRESS NOTES
[address] [facility]    NAME: Farhat Andrade  AGE: 38 y o  SEX: female  : 1980     DATE: 10/11/2022     Assessment and Plan:     Problem List Items Addressed This Visit        Other    Annual physical exam - Primary     Discussed general health recommendations and screening guidelines  Up to date on cervical cancer screening  History of breast cancer  Up to date on follow up  Declines vaccinations today  Recommend routine dental and eye exams  Next physical one year  BMI 39 0-39 9,adult     See counseling  Tuberculosis screening     Patient presented for work physical today  Requires PPD for TB screen  Relevant Orders    TB Skin Test (Completed)          Immunizations and preventive care screenings were discussed with patient today  Appropriate education was printed on patient's after visit summary  Counseling:  Alcohol/drug use: discussed moderation in alcohol intake, the recommendations for healthy alcohol use, and avoidance of illicit drug use  Dental Health: discussed importance of regular tooth brushing, flossing, and dental visits  Injury prevention: discussed safety/seat belts, safety helmets, smoke detectors, carbon dioxide detectors, and smoking near bedding or upholstery  Sexual health: discussed sexually transmitted diseases, partner selection, use of condoms, avoidance of unintended pregnancy, and contraceptive alternatives  · Exercise: the importance of regular exercise/physical activity was discussed  Recommend exercise 3-5 times per week for at least 30 minutes  BMI Counseling: Body mass index is 39 63 kg/m²   The BMI is above normal  Nutrition recommendations include decreasing portion sizes, encouraging healthy choices of fruits and vegetables, decreasing fast food intake, consuming healthier snacks, limiting drinks that contain sugar, moderation in carbohydrate intake, increasing intake of lean protein, reducing intake of saturated and trans fat and reducing intake of cholesterol  Exercise recommendations include moderate physical activity 150 minutes/week, exercising 3-5 times per week and strength training exercises  No pharmacotherapy was ordered  Rationale for BMI follow-up plan is due to patient being overweight or obese  Return in 1 year (on 10/10/2023)  Chief Complaint:     Chief Complaint   Patient presents with   • Physical Exam      History of Present Illness:     Adult Annual Physical   Patient here for a comprehensive physical exam  The patient reports no problems  Diet and Physical Activity  · Diet/Nutrition: well balanced diet, limited junk food and consuming 3-5 servings of fruits/vegetables daily  · Exercise: no formal exercise  General Health  · Sleep: sleeps well and gets 7-8 hours of sleep on average  · Hearing: normal - bilateral   · Vision: no vision problems, goes for regular eye exams and most recent eye exam <1 year ago  · Dental: regular dental visits and brushes teeth twice daily  /GYN Health  · Patient is: perimenopausal  · Last menstrual period: 10/10/2022  · Contraceptive method: barrier methods  Review of Systems:     Review of Systems   Constitutional: Negative for activity change, appetite change, chills, diaphoresis, fatigue, fever and unexpected weight change  HENT: Negative for congestion, dental problem, hearing loss, sore throat, tinnitus and trouble swallowing  Eyes: Negative for visual disturbance  Respiratory: Negative for cough, chest tightness, shortness of breath and wheezing  Cardiovascular: Negative for chest pain, palpitations and leg swelling  Gastrointestinal: Negative for abdominal pain, blood in stool, constipation, diarrhea, nausea and vomiting  Endocrine: Negative for cold intolerance, heat intolerance, polydipsia, polyphagia and polyuria     Genitourinary: Negative for decreased urine volume, difficulty urinating, dysuria, frequency, hematuria and urgency  Musculoskeletal: Negative for arthralgias, back pain, gait problem, joint swelling and myalgias  Skin: Negative for color change, pallor, rash and wound  Neurological: Negative for dizziness, tremors, syncope, weakness, light-headedness, numbness and headaches  Hematological: Negative for adenopathy  Psychiatric/Behavioral: Negative for dysphoric mood, self-injury, sleep disturbance and suicidal ideas  The patient is not nervous/anxious  Past Medical History:     Past Medical History:   Diagnosis Date   • Abnormal Pap smear of cervix    • Anxiety 01/2015    Last assessed   • BRCA gene mutation negative 07/24/2020    [employer] 20 gene panel   • Breast cancer ([facility] [address] ) 08/28/2020    left breast DCIS   • HPV (human papilloma virus) infection     unsure      Past Surgical History:     Past Surgical History:   Procedure Laterality Date   • BREAST BIOPSY Left 07/20/2020    DCIS   • BREAST CYST EXCISION Left 08/2020    DCIS   • BREAST LUMPECTOMY Left 8/28/2020    Procedure: BREAST NEEDLE LOCALIZED LUMPECTOMY (NEEDLE LOC AT 0900);   Surgeon: Abundio Beth MD;  Location: [facility];  Service: Surgical Oncology   • CHOLECYSTECTOMY     • CHOLECYSTECTOMY LAPAROSCOPIC  01/2015    Last assessed   • COLPOSCOPY     • MAMMO NEEDLE LOCALIZATION LEFT (ALL INC) Left 8/28/2020   • MAMMO STEREOTACTIC BREAST BIOPSY LEFT ([employer] INC) Left 7/20/2020   • MOUTH SURGERY        Social History:     Social History     Socioeconomic History   • Marital status: Single     Spouse name: None   • Number of children: None   • Years of education: None   • Highest education level: None   Occupational History   • None   Tobacco Use   • Smoking status: Former Smoker     Packs/day: 1 00     Years: 25 00     Pack years: 25 00     Types: Cigarettes     Quit date: 4/8/2019     Years since quitting: 3 5   • Smokeless tobacco: Never Used   Vaping Use   • Vaping Use: Former   • Substances: Nicotine   Substance and Sexual Activity   • Alcohol use: Not Currently     Alcohol/week: 0 0 standard drinks   • Drug use: Not Currently     Types: Marijuana   • Sexual activity: Not Currently     Partners: Male     Birth control/protection: Abstinence, None   Other Topics Concern   • None   Social History Narrative    Caffeine use, Does not exercise, Full-time work, One child, Single,      Social Determinants of Health     Financial Resource Strain: Low Risk    • Difficulty of Paying Living Expenses: Not hard at all   Food Insecurity: No Food Insecurity   • Worried About [employer] in the Last Year: Never true   • Ran Out of Food in the Last Year: Never true   Transportation Needs: No Transportation Needs   • Lack of Transportation (Medical): No   • Lack of Transportation (Non-Medical): No   Physical Activity: Inactive   • Days of Exercise per Week: 0 days   • Minutes of Exercise per Session: 0 min   Stress: Stress Concern Present   • Feeling of Stress :  To some extent   Social Connections: Socially Isolated   • Frequency of Communication with Friends and Family: More than three times a week   • Frequency of Social Gatherings with Friends and Family: More than three times a week   • Attends [employer] Services: Never   • Active Member of Clubs or Organizations: No   • Attends Club or Organization Meetings: Never   • Marital Status: Never    Intimate Partner Violence: Not At Risk   • Fear of Current or Ex-Partner: No   • Emotionally Abused: No   • Physically Abused: No   • Sexually Abused: No   Housing Stability: Low Risk    • Unable to Pay for Housing in the Last Year: No   • Number of Places Lived in the Last Year: 1   • Unstable Housing in the Last Year: No      Family History:     Family History   Problem Relation Age of Onset   • Heart attack Father         Acute   • Heart failure Father    • Coronary artery disease Father    • Diabetes Father         DM   • Hypertension Father    • No Known Problems Maternal Grandmother    • Alzheimer's disease Maternal Grandfather    • Stroke Paternal Grandmother    • Prostate cancer Paternal Grandfather 76   • Cancer Paternal Grandfather    • No Known Problems Mother    • No Known Problems Son    • No Known Problems Sister    • No Known Problems Half-Sister    • No Known Problems Half-Sister    • No Known Problems Brother    • No Known Problems Maternal Uncle    • No Known Problems Maternal Uncle    • No Known Problems Maternal Aunt    • No Known Problems Maternal Aunt    • No Known Problems Maternal Aunt    • No Known Problems Paternal Uncle    • No Known Problems Paternal Uncle       Current Medications:     Current Outpatient Medications   Medication Sig Dispense Refill   • atorvastatin (LIPITOR) 20 mg tablet TAKE 1 TABLET BY MOUTH EVERY DAY 90 tablet 3   • Biotin 5000 MCG CAPS Take by mouth     • cetirizine (ZyrTEC) 10 mg tablet Take 10 mg by mouth daily     • DAILY MULTIPLE VITAMINS/IRON TABS Take by mouth     • Omega-3 Fatty Acids (fish oil) 1,000 mg Take 1,000 mg by mouth daily     • Probiotic Product (ACIDOPHILUS PROBIOTIC BLEND) CAPS Take by mouth     • Tranexamic Acid 650 MG TABS TAKE 2 TABLETS (1,300 MG TOTAL) BY MOUTH 3 (THREE) TIMES A DAY FOR 5 DAYS       No current facility-administered medications for this visit  Allergies: Allergies   Allergen Reactions   • Seasonal Ic [Cholestatin] Cough      Physical Exam:     /82 (BP Location: Left arm, Patient Position: Sitting, Cuff Size: Large)   Pulse 80   Temp 99 2 °F (37 3 °C) (Temporal)   Ht 5' 7" (1 702 m)   Wt 115 kg (253 lb)   LMP 10/10/2022 (Exact Date)   Breastfeeding No   BMI 39 63 kg/m²     Physical Exam  Vitals and nursing note reviewed  Constitutional:       General: She is awake  She is not in acute distress  Appearance: Normal appearance  She is well-developed and well-groomed  She is obese  She is not ill-appearing     HENT:      Head: Normocephalic and atraumatic  Right Ear: Hearing, tympanic membrane, ear canal and external ear normal       Left Ear: Hearing, tympanic membrane, ear canal and external ear normal       Nose: Nose normal  No congestion or rhinorrhea  Mouth/Throat:      Lips: Pink  Mouth: Mucous membranes are moist       Pharynx: Oropharynx is clear  Uvula midline  No oropharyngeal exudate or posterior oropharyngeal erythema  Tonsils: No tonsillar exudate or tonsillar abscesses  Eyes:      General: Lids are normal  No scleral icterus  Extraocular Movements: Extraocular movements intact  Conjunctiva/sclera: Conjunctivae normal       Pupils: Pupils are equal, round, and reactive to light  Cardiovascular:      Rate and Rhythm: Normal rate and regular rhythm  Pulses: Normal pulses  Heart sounds: Normal heart sounds  No murmur heard  Pulmonary:      Effort: Pulmonary effort is normal  No respiratory distress  Breath sounds: Normal breath sounds and air entry  No decreased air movement  No decreased breath sounds, wheezing, rhonchi or rales  Abdominal:      General: Abdomen is flat  Bowel sounds are normal  There is no distension  Palpations: Abdomen is soft  There is no mass  Tenderness: There is no abdominal tenderness  There is no right CVA tenderness, left CVA tenderness, guarding or rebound  Hernia: No hernia is present  Musculoskeletal:         General: Normal range of motion  Cervical back: Full passive range of motion without pain, normal range of motion and neck supple  Right lower leg: No edema  Left lower leg: No edema  Lymphadenopathy:      Cervical: No cervical adenopathy  Skin:     General: Skin is warm  Capillary Refill: Capillary refill takes less than 2 seconds  Coloration: Skin is not jaundiced  Findings: No rash  Neurological:      General: No focal deficit present        Mental Status: She is alert and oriented to person, place, and time  Mental status is at baseline  Cranial Nerves: Cranial nerves are intact  Sensory: Sensation is intact  Motor: Motor function is intact  Coordination: Coordination is intact  Gait: Gait is intact  Psychiatric:         Attention and Perception: Attention and perception normal          Mood and Affect: Mood and affect normal          Speech: Speech normal          Behavior: Behavior normal  Behavior is cooperative  Thought Content:  Thought content normal          Cognition and Memory: Cognition and memory normal          Judgment: Judgment normal           Kathleen Watters

## 2022-10-10 NOTE — PATIENT INSTRUCTIONS
Wellness Visit for Adults   AMBULATORY CARE:   A wellness visit  is when you see your healthcare provider to get screened for health problems  Your healthcare provider will also give you advice on how to stay healthy  Write down your questions so you remember to ask them  Ask your healthcare provider how often you should have a wellness visit  What happens at a wellness visit:  Your healthcare provider will ask about your health, and your family history of health problems  This includes high blood pressure, heart disease, and cancer  He or she will ask if you have symptoms that concern you, if you smoke, and about your mood  You may also be asked about your intake of medicines, supplements, food, and alcohol  Any of the following may be done:  · Your weight  will be checked  Your height may also be checked so your body mass index (BMI) can be calculated  Your BMI shows if you are at a healthy weight  · Your blood pressure  and heart rate will be checked  Your temperature may also be checked  · Blood and urine tests  may be done  Blood tests may be done to check your cholesterol levels  Abnormal cholesterol levels increase your risk for heart disease and stroke  You may also need a blood or urine test to check for diabetes if you are at increased risk  Urine tests may be done to look for signs of an infection or kidney disease  · A physical exam  includes checking your heartbeat and lungs with a stethoscope  Your healthcare provider may also check your skin to look for sun damage  · Screening tests  may be recommended  A screening test is done to check for diseases that may not cause symptoms  The screening tests you may need depend on your age, gender, family history, and lifestyle habits  For example, colorectal screening may be recommended if you are 48years old or older  Screening tests you need if you are a woman:   · A Pap smear  is used to screen for cervical cancer   Pap smears are usually done every 3 to 5 years depending on your age  You may need them more often if you have had abnormal Pap smear test results in the past  Ask your healthcare provider how often you should have a Pap smear  · A mammogram  is an x-ray of your breasts to screen for breast cancer  Experts recommend mammograms every 2 years starting at age 48 years  You may need a mammogram at age 52 years or younger if you have an increased risk for breast cancer  Talk to your healthcare provider about when you should start having mammograms and how often you need them  Vaccines you may need:   · Get an influenza vaccine  every year  The influenza vaccine protects you from the flu  Several types of viruses cause the flu  The viruses change over time, so new vaccines are made each year  · Get a tetanus-diphtheria (Td) booster vaccine  every 10 years  This vaccine protects you against tetanus and diphtheria  Tetanus is a severe infection that may cause painful muscle spasms and lockjaw  Diphtheria is a severe bacterial infection that causes a thick covering in the back of your mouth and throat  · Get a human papillomavirus (HPV) vaccine  if you are female and aged 23 to 32 or male 23 to 24 and never received it  This vaccine protects you from HPV infection  HPV is the most common infection spread by sexual contact  HPV may also cause vaginal, penile, and anal cancers  · Get a pneumococcal vaccine  if you are aged 72 years or older  The pneumococcal vaccine is an injection given to protect you from pneumococcal disease  Pneumococcal disease is an infection caused by pneumococcal bacteria  The infection may cause pneumonia, meningitis, or an ear infection  · Get a shingles vaccine  if you are 60 or older, even if you have had shingles before  The shingles vaccine is an injection to protect you from the varicella-zoster virus  This is the same virus that causes chickenpox   Shingles is a painful rash that develops in people who had chickenpox or have been exposed to the virus  How to eat healthy:  My Plate is a model for planning healthy meals  It shows the types and amounts of foods that should go on your plate  Fruits and vegetables make up about half of your plate, and grains and protein make up the other half  A serving of dairy is included on the side of your plate  The amount of calories and serving sizes you need depends on your age, gender, weight, and height  Examples of healthy foods are listed below:  · Eat a variety of vegetables  such as dark green, red, and orange vegetables  You can also include canned vegetables low in sodium (salt) and frozen vegetables without added butter or sauces  · Eat a variety of fresh fruits , canned fruit in 100% juice, frozen fruit, and dried fruit  · Include whole grains  At least half of the grains you eat should be whole grains  Examples include whole-wheat bread, wheat pasta, brown rice, and whole-grain cereals such as oatmeal     · Eat a variety of protein foods such as seafood (fish and shellfish), lean meat, and poultry without skin (turkey and chicken)  Examples of lean meats include pork leg, shoulder, or tenderloin, and beef round, sirloin, tenderloin, and extra lean ground beef  Other protein foods include eggs and egg substitutes, beans, peas, soy products, nuts, and seeds  · Choose low-fat dairy products such as skim or 1% milk or low-fat yogurt, cheese, and cottage cheese  · Limit unhealthy fats  such as butter, hard margarine, and shortening  Exercise:  Exercise at least 30 minutes per day on most days of the week  Some examples of exercise include walking, biking, dancing, and swimming  You can also fit in more physical activity by taking the stairs instead of the elevator or parking farther away from stores  Include muscle strengthening activities 2 days each week  Regular exercise provides many health benefits   It helps you manage your weight, and decreases your risk for type 2 diabetes, heart disease, stroke, and high blood pressure  Exercise can also help improve your mood  Ask your healthcare provider about the best exercise plan for you  General health and safety guidelines:   · Do not smoke  Nicotine and other chemicals in cigarettes and cigars can cause lung damage  Ask your healthcare provider for information if you currently smoke and need help to quit  E-cigarettes or smokeless tobacco still contain nicotine  Talk to your healthcare provider before you use these products  · Limit alcohol  A drink of alcohol is 12 ounces of beer, 5 ounces of wine, or 1½ ounces of liquor  · Lose weight, if needed  Being overweight increases your risk of certain health conditions  These include heart disease, high blood pressure, type 2 diabetes, and certain types of cancer  · Protect your skin  Do not sunbathe or use tanning beds  Use sunscreen with a SPF 15 or higher  Apply sunscreen at least 15 minutes before you go outside  Reapply sunscreen every 2 hours  Wear protective clothing, hats, and sunglasses when you are outside  · Drive safely  Always wear your seatbelt  Make sure everyone in your car wears a seatbelt  A seatbelt can save your life if you are in an accident  Do not use your cell phone when you are driving  This could distract you and cause an accident  Pull over if you need to make a call or send a text message  · Practice safe sex  Use latex condoms if are sexually active and have more than one partner  Your healthcare provider may recommend screening tests for sexually transmitted infections (STIs)  · Wear helmets, lifejackets, and protective gear  Always wear a helmet when you ride a bike or motorcycle, go skiing, or play sports that could cause a head injury  Wear protective equipment when you play sports  Wear a lifejacket when you are on a boat or doing water sports      © Copyright [employer] 2022 Information is for End User's use only and may not be sold, redistributed or otherwise used for commercial purposes  All illustrations and images included in CareNotes® are the copyrighted property of A D A M [employer]  or [employer]  The above information is an  only  It is not intended as medical advice for individual conditions or treatments  Talk to your doctor, nurse or pharmacist before following any medical regimen to see if it is safe and effective for you  Weight Management   AMBULATORY CARE:   Why it is important to manage your weight:  Being overweight increases your risk of health conditions such as heart disease, high blood pressure, type 2 diabetes, and certain types of cancer  It can also increase your risk for osteoarthritis, sleep apnea, and other respiratory problems  Aim for a slow, steady weight loss  Even a small amount of weight loss can lower your risk of health problems  Risks of being overweight:  Extra weight can cause many health problems, including the following:  · Diabetes (high blood sugar level)    · High blood pressure or high cholesterol    · Heart disease    · Stroke    · Gallbladder or liver disease    · Cancer of the colon, breast, prostate, liver, or kidney    · Sleep apnea    · Arthritis or gout    Screening  is done to check for health conditions before you have signs or symptoms  If you are 28to 79years old, your blood sugar level may be checked every 3 years for signs of prediabetes or diabetes  Your healthcare provider will check your blood pressure at each visit  High blood pressure can lead to a stroke or other problems  Your provider may check for signs of heart disease, cancer, or other health problems  How to lose weight safely:  A safe and healthy way to lose weight is to eat fewer calories and get regular exercise  · You can lose up about 1 pound a week by decreasing the number of calories you eat by 500 calories each day   You can decrease calories by eating smaller portion sizes or by cutting out high-calorie foods  Read labels to find out how many calories are in the foods you eat  · You can also burn calories with exercise such as walking, swimming, or biking  You will be more likely to keep weight off if you make these changes part of your lifestyle  Exercise at least 30 minutes per day on most days of the week  You can also fit in more physical activity by taking the stairs instead of the elevator or parking farther away from stores  Ask your healthcare provider about the best exercise plan for you  Healthy meal plan for weight management:  A healthy meal plan includes a variety of foods, contains fewer calories, and helps you stay healthy  A healthy meal plan includes the following:     · Eat whole-grain foods more often  A healthy meal plan should contain fiber  Fiber is the part of grains, fruits, and vegetables that is not broken down by your body  Whole-grain foods are healthy and provide extra fiber in your diet  Some examples of whole-grain foods are whole-wheat breads and pastas, oatmeal, brown rice, and bulgur  · Eat a variety of vegetables every day  Include dark, leafy greens such as spinach, kale, kristy greens, and mustard greens  Eat yellow and orange vegetables such as carrots, sweet potatoes, and winter squash  · Eat a variety of fruits every day  Choose fresh or canned fruit (canned in its own juice or light syrup) instead of juice  Fruit juice has very little or no fiber  · Eat low-fat dairy foods  Drink fat-free (skim) milk or 1% milk  Eat fat-free yogurt and low-fat cottage cheese  Try low-fat cheeses such as mozzarella and other reduced-fat cheeses  · Choose meat and other protein foods that are low in fat  Choose beans or other legumes such as split peas or lentils  Choose fish, skinless poultry (chicken or turkey), or lean cuts of red meat (beef or pork)   Before you cook meat or poultry, cut off any visible fat  · Use less fat and oil  Try baking foods instead of frying them  Add less fat, such as margarine, sour cream, regular salad dressing and mayonnaise to foods  Eat fewer high-fat foods  Some examples of high-fat foods include french fries, doughnuts, ice cream, and cakes  · Eat fewer sweets  Limit foods and drinks that are high in sugar  This includes candy, cookies, regular soda, and sweetened drinks  Ways to decrease calories:   · Eat smaller portions  ? Use a small plate with smaller servings  ? Do not eat second helpings  ? When you eat at a restaurant, ask for a box and place half of your meal in the box before you eat  ? Share an entrée with someone else  · Replace high-calorie snacks with healthy, low-calorie snacks  ? Choose fresh fruit, vegetables, fat-free rice cakes, or air-popped popcorn instead of potato chips, nuts, or chocolate  ? Choose water or calorie-free drinks instead of soda or sweetened drinks  · Do not shop for groceries when you are hungry  You may be more likely to make unhealthy food choices  Take a grocery list of healthy foods and shop after you have eaten  · Eat regular meals  Do not skip meals  Skipping meals can lead to overeating later in the day  This can make it harder for you to lose weight  Eat a healthy snack in place of a meal if you do not have time to eat a regular meal  Talk with a dietitian to help you create a meal plan and schedule that is right for you  Other things to consider as you try to lose weight:   · Be aware of situations that may give you the urge to overeat, such as eating while watching television  Find ways to avoid these situations  For example, read a book, go for a walk, or do crafts  · Meet with a weight loss support group or friends who are also trying to lose weight  This may help you stay motivated to continue working on your weight loss goals      © Copyright [employer] 2022 Information is for End User's use only and may not be sold, redistributed or otherwise used for commercial purposes  All illustrations and images included in CareNotes® are the copyrighted property of A D A M , Inc  or Jose Lees  The above information is an  only  It is not intended as medical advice for individual conditions or treatments  Talk to your doctor, nurse or pharmacist before following any medical regimen to see if it is safe and effective for you

## 2022-10-11 VITALS
HEIGHT: 67 IN | TEMPERATURE: 99.2 F | DIASTOLIC BLOOD PRESSURE: 82 MMHG | SYSTOLIC BLOOD PRESSURE: 136 MMHG | HEART RATE: 80 BPM | WEIGHT: 253 LBS | BODY MASS INDEX: 39.71 KG/M2

## 2022-10-11 PROBLEM — Z11.1 TUBERCULOSIS SCREENING: Status: ACTIVE | Noted: 2022-10-11

## 2022-10-11 PROBLEM — Z00.00 ANNUAL PHYSICAL EXAM: Status: ACTIVE | Noted: 2022-10-11

## 2022-10-11 NOTE — ASSESSMENT & PLAN NOTE
Discussed general health recommendations and screening guidelines  Up to date on cervical cancer screening  History of breast cancer  Up to date on follow up  Declines vaccinations today  Recommend routine dental and eye exams  Next physical one year

## 2022-10-13 LAB
INDURATION: 0 MM
TB SKIN TEST: NEGATIVE

## 2022-10-13 NOTE — TELEPHONE ENCOUNTER
PPD read on 10/13/22 as negative, 0mm - copy of form in for scanning and original given to the patient

## 2022-10-27 PROBLEM — N93.9 ABNORMAL UTERINE BLEEDING (AUB): Status: ACTIVE | Noted: 2022-10-27

## 2022-11-02 ENCOUNTER — TELEPHONE (OUTPATIENT)
Dept: HEMATOLOGY ONCOLOGY | Facility: CLINIC | Age: 42
End: 2022-11-02

## 2022-11-02 NOTE — TELEPHONE ENCOUNTER
Appointment Confirmation (to confirm pre existing appointments - ONLY)  No need to route   Appointment with Kimberly Hansen    Appointment date & time 11/9 at 3:30pm   Location [facility]   Patient verbilized Understanding  Yes

## 2022-11-03 NOTE — PROGRESS NOTES
OB/GYN VISIT  Naga Hernandez  11/10/2022  4:07 PM    Subjective:        Naga Hernandez is a 38 y o  [place] (SVDx1) female who presents for menorrhagia follow up  TVUS as below  Submucosal fibroid likely contributing to her bleeding  Labs normal, Hgb not significant for anemia  She complains of heavy, painful periods lasting 7-10 days followed by occasional light spotting since removal of Mirena IUD 2 years ago (her oncologist recommended she remove it after she was diagnosed w/ breast cancer)  She has heavy bleeding on days 1 and 2 with associated painful cramping  She reports bleeding through both super-tampons and pads with passage of large clots on the first two days of her period  She has bled through clothing on several instances  Occasionally she has additional passage of clots on days 6-7  The passage of clots is associated with increased painful cramping  Bleeding limits work and activities, and she has had to miss several obligations a month due to symptoms  She denies any changes in bowel function or urinary symptoms  She denies headaches, dizziness, or increased fatigue  She is unable to take hormonal birth control options due to history of ER+/TX+ breast cancer       UTERUS:  The uterus is anteverted in position, measuring 8 6 x 4 9 x 6 6 cm  The uterus has a normal contour and echotexture  A submucosal fibroid with substantial intracavitary component measures 2 9 x 2 9 x 2 4 cm  A right uterine body subserosal fibroid measures 1 7 x 0 5 x 0 6 cm  The cervix appears within normal limits        Past Medical History:   Diagnosis Date   • Abnormal Pap smear of cervix    • Anxiety 01/2015    Last assessed   • BRCA gene mutation negative 07/24/2020    [employer] 20 gene panel   • Breast cancer ([facility] [address] ) 08/28/2020    left breast DCIS   • HPV (human papilloma virus) infection     unsure     Past Surgical History:   Procedure Laterality Date   • BREAST BIOPSY Left 07/20/2020    DCIS   • BREAST CYST EXCISION Left 08/2020    DCIS   • BREAST LUMPECTOMY Left 8/28/2020    Procedure: BREAST NEEDLE LOCALIZED LUMPECTOMY (NEEDLE LOC AT 0900); Surgeon: Shwetha Tam MD;  Location: [facility];  Service: Surgical Oncology   • CHOLECYSTECTOMY     • CHOLECYSTECTOMY LAPAROSCOPIC  01/2015    Last assessed   • COLPOSCOPY     • MAMMO NEEDLE LOCALIZATION LEFT (ALL INC) Left 8/28/2020   • MAMMO STEREOTACTIC BREAST BIOPSY LEFT (ALL INC) Left 7/20/2020   • MOUTH SURGERY         Objective:    Vitals: Blood pressure 128/83, pulse 76, resp  rate 18, height 5' 7" (1 702 m), weight 113 kg (249 lb), not currently breastfeeding  Body mass index is 39 kg/m²  Assessment/Plan:  Problem List Items Addressed This Visit        Genitourinary    Abnormal uterine bleeding (AUB) - Primary     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)  She will discuss with her oncologist (Dr Perlita Armenta) whether he would recommend oophorectomy given her hormonally sensitive breast cancer  In general, she would prefer not to go through surgical menopause if not medically recommended  · Consented for D&C, Hysteroscopy, Myomectomy (will be with symphion device)  Discussed risks of bleeding, pain, infection, perforation, injury to surrounding organs  We discussed same day procedure, post op recovery expectations     · Accepts intraoperative photos, CPR, blood, medical student involvement  · Aware that her case will be reviewed through the surgical scheduling committee, and she will receive a call once approved to schedule her H&P  She will need a pelvic exam at that H&P  · She prefers a Friday afternoon slot if possible to minimize time off work  D/w Dr Amy Young  11/10/2022  4:07 PM    Please note, all notes within the [facility] are written in medical terminology for other doctors to read  If you have a question about something you see in your chart, please don't hesitate to ask about it at your next appointment  All test results are immediately available through [employer] as well, and I will review results at my earliest opportunity and contact you with the appropriate urgency

## 2022-11-09 ENCOUNTER — OFFICE VISIT (OUTPATIENT)
Dept: SURGICAL ONCOLOGY | Facility: CLINIC | Age: 42
End: 2022-11-09

## 2022-11-09 VITALS
SYSTOLIC BLOOD PRESSURE: 180 MMHG | OXYGEN SATURATION: 99 % | TEMPERATURE: 97.8 F | DIASTOLIC BLOOD PRESSURE: 100 MMHG | WEIGHT: 250 LBS | RESPIRATION RATE: 18 BRPM | HEIGHT: 67 IN | BODY MASS INDEX: 39.24 KG/M2 | HEART RATE: 80 BPM

## 2022-11-09 DIAGNOSIS — Z86.000 HISTORY OF DUCTAL CARCINOMA IN SITU (DCIS) OF BREAST: ICD-10-CM

## 2022-11-09 DIAGNOSIS — Z08 ENCOUNTER FOR FOLLOW-UP EXAMINATION AFTER COMPLETED TREATMENT FOR MALIGNANT NEOPLASM: Primary | ICD-10-CM

## 2022-11-09 NOTE — PROGRESS NOTES
Surgical Oncology Follow Up       [address] [facility]  [facility]  [address]  [facility] [address]    Naif Coles  1980  [id number]  [address]  [facility]  [address] [address]    Chief Complaint   Patient presents with   • Breast Cancer       Assessment/Plan:  1  Encounter for follow-up examination after completed treatment for malignant neoplasm  - 6 month follow up    2  History of ductal carcinoma in situ (DCIS) of breast      Discussion/Summary: Patient is a 69-year-old female presenting today for six-month follow-up for left breast cancer diagnosed in July 2020  Pathology revealed DCIS ER/AR+  She underwent genetic testing which was negative  She had a left breast lumpectomy with Dr Kimberly Liriano  She did not need RT and she denied tamoxifen  She had a bilateral diagnostic mammogram on 07/27/2022 which was BI-RADS 2 category 1 density  There were no concerns on her clinical breast exam  I will see the patient back in 6 months or sooner should the need arise  She was instructed to call with any questions or concerns prior to this time  All questions were answered today  History of Present Illness:     Oncology History   History of ductal carcinoma in situ (DCIS) of breast   7/20/2020 Biopsy    Left breast stereotactic biopsy  11 o'clock, anterior depth  Ductal carcinoma in situ  Grade 2        Concordant  Malignancy appears unifocal  Calcifications spanned 5 mm  Right breast clear  7/24/2020 Genetic Testing    The following genes were evaluated: AARON, BRCA1, BRCA2, CDH1, CHEK2, PALB2, PTEN, STK11, TP53  Additional genes were analyzed for a total of 20 genes  Negative result   No pathogenic sequence variants or deletions/dupllications identified  [employer]     8/28/2020 Surgery    Left breast needle localized lumpectomy  Ductal carcinoma in situ  Grade 2  Size cannot be determined (5/34 blocks)  Margins negative  Stage 0     9/14/2020 Genomic Testing    DCISion RT low risk  10 year total risk with breast conserving surgery alone 7%  10 year total risk with breast conserving surgery and radiation 7%     10/27/2020 - 10/27/2020 Hormone Therapy    Consult with Dr Aster Bowen Tamoxifen          -Interval History: Patient is a 49-year-old female presenting today for six-month follow-up for left breast cancer diagnosed in July 2020  She is currently on observation  She had a bilateral diagnostic mammogram on 07/27/2022 which was BI-RADS 2 category 1 density  Patient denies changes on her breast exam  She denies persistent headaches, bone pain, back pain, shortness of breath, or abdominal pain  Review of Systems:  Review of Systems   Constitutional: Negative for activity change, appetite change, fatigue and unexpected weight change  Respiratory: Negative for cough and shortness of breath  Cardiovascular: Negative for chest pain  Gastrointestinal: Negative for abdominal pain, diarrhea, nausea and vomiting  Endocrine: Negative for heat intolerance  Musculoskeletal: Negative for arthralgias, back pain and myalgias  Skin: Negative for rash  Neurological: Negative for weakness and headaches  Hematological: Negative for adenopathy         Patient Active Problem List   Diagnosis   • Anxiety   • History of ductal carcinoma in situ (DCIS) of breast   • Class 2 obesity due to excess calories without serious comorbidity with body mass index (BMI) of 39 0 to 39 9 in adult   • Pure hypercholesterolemia   • Gastroesophageal reflux disease   • Encounter for follow-up examination after completed treatment for malignant neoplasm   • Hepatic steatosis   • Thrombocytosis   • Annual physical exam   • BMI 39 0-39 9,adult   • Tuberculosis screening   • Abnormal uterine bleeding (AUB)     Past Medical History:   Diagnosis Date   • Abnormal Pap smear of cervix    • Anxiety 01/2015    Last assessed   • BRCA gene mutation negative 07/24/2020    [employer] 20 gene panel   • Breast cancer ([facility] [address] ) 08/28/2020    left breast DCIS   • HPV (human papilloma virus) infection     unsure     Past Surgical History:   Procedure Laterality Date   • BREAST BIOPSY Left 07/20/2020    DCIS   • BREAST CYST EXCISION Left 08/2020    DCIS   • BREAST LUMPECTOMY Left 8/28/2020    Procedure: BREAST NEEDLE LOCALIZED LUMPECTOMY (NEEDLE LOC AT 0900);   Surgeon: Pattie Rodas MD;  Location: [facility];  Service: Surgical Oncology   • CHOLECYSTECTOMY     • CHOLECYSTECTOMY LAPAROSCOPIC  01/2015    Last assessed   • COLPOSCOPY     • MAMMO NEEDLE LOCALIZATION LEFT (ALL INC) Left 8/28/2020   • MAMMO STEREOTACTIC BREAST BIOPSY LEFT (ALL INC) Left 7/20/2020   • MOUTH SURGERY       Family History   Problem Relation Age of Onset   • Heart attack Father         Acute   • Heart failure Father    • Coronary artery disease Father    • Diabetes Father         DM   • Hypertension Father    • No Known Problems Maternal Grandmother    • Alzheimer's disease Maternal Grandfather    • Stroke Paternal Grandmother    • Prostate cancer Paternal Grandfather 76   • Cancer Paternal Grandfather    • No Known Problems Mother    • No Known Problems Son    • No Known Problems Sister    • No Known Problems Half-Sister    • No Known Problems Half-Sister    • No Known Problems Brother    • No Known Problems Maternal Uncle    • No Known Problems Maternal Uncle    • No Known Problems Maternal Aunt    • No Known Problems Maternal Aunt    • No Known Problems Maternal Aunt    • No Known Problems Paternal Uncle    • No Known Problems Paternal Uncle      Social History     Socioeconomic History   • Marital status: Single     Spouse name: Not on file   • Number of children: Not on file   • Years of education: Not on file   • Highest education level: Not on file   Occupational History   • Not on file   Tobacco Use   • Smoking status: Former Smoker     Packs/day: 1 00     Years: 25 00     Pack years: 25 00     Types: Cigarettes     Quit date: 4/8/2019     Years since quitting: 3 5   • Smokeless tobacco: Never Used   Vaping Use   • Vaping Use: Former   • Substances: Nicotine   Substance and Sexual Activity   • Alcohol use: Not Currently     Alcohol/week: 0 0 standard drinks   • Drug use: Not Currently     Types: Marijuana   • Sexual activity: Not Currently     Partners: Male     Birth control/protection: Abstinence, None   Other Topics Concern   • Not on file   Social History Narrative    Caffeine use, Does not exercise, Full-time work, One child, Single,      Social Determinants of Health     Financial Resource Strain: Low Risk    • Difficulty of Paying Living Expenses: Not hard at all   Food Insecurity: No Food Insecurity   • Worried About [employer] in the Last Year: Never true   • Ran Out of Food in the Last Year: Never true   Transportation Needs: No Transportation Needs   • Lack of Transportation (Medical): No   • Lack of Transportation (Non-Medical): No   Physical Activity: Inactive   • Days of Exercise per Week: 0 days   • Minutes of Exercise per Session: 0 min   Stress: Stress Concern Present   • Feeling of Stress :  To some extent   Social Connections: Socially Isolated   • Frequency of Communication with Friends and Family: More than three times a week   • Frequency of Social Gatherings with Friends and Family: More than three times a week   • Attends Jehov[employer]'s witness Services: Never   • Active Member of Clubs or Organizations: No   • Attends Club or Organization Meetings: Never   • Marital Status: Never    Intimate Partner Violence: Not At Risk   • Fear of Current or Ex-Partner: No   • Emotionally Abused: No   • Physically Abused: No   • Sexually Abused: No   Housing Stability: Low Risk    • Unable to Pay for Housing in the Last Year: No   • Number of [place] in the Last Year: 1   • Unstable Housing in the Last Year: No       Current Outpatient Medications:   •  atorvastatin (LIPITOR) 20 mg tablet, TAKE 1 TABLET BY MOUTH EVERY DAY, Disp: 90 tablet, Rfl: 3  •  Biotin 5000 MCG CAPS, Take by mouth, Disp: , Rfl:   •  cetirizine (ZyrTEC) 10 mg tablet, Take 10 mg by mouth daily, Disp: , Rfl:   •  DAILY MULTIPLE VITAMINS/IRON TABS, Take by mouth, Disp: , Rfl:   •  Omega-3 Fatty Acids (fish oil) 1,000 mg, Take 1,000 mg by mouth daily, Disp: , Rfl:   •  Probiotic Product (ACIDOPHILUS PROBIOTIC BLEND) CAPS, Take by mouth, Disp: , Rfl:   •  Tranexamic Acid 650 MG TABS, TAKE 2 TABLETS (1,300 MG TOTAL) BY MOUTH 3 (THREE) TIMES A DAY FOR 5 DAYS, Disp: , Rfl:   Allergies   Allergen Reactions   • Seasonal Ic [Cholestatin] Cough     There were no vitals filed for this visit  Physical Exam  Constitutional:       General: She is not in acute distress  Appearance: Normal appearance  Cardiovascular:      Rate and Rhythm: Normal rate and regular rhythm  Pulses: Normal pulses  Heart sounds: Normal heart sounds  Pulmonary:      Effort: Pulmonary effort is normal       Breath sounds: Normal breath sounds  Chest:      Chest wall: No mass  Breasts:      Right: No swelling, bleeding, inverted nipple, mass, nipple discharge, skin change, tenderness, axillary adenopathy or supraclavicular adenopathy  Left: No swelling, bleeding, inverted nipple, mass, nipple discharge, skin change, tenderness, axillary adenopathy or supraclavicular adenopathy  Comments: Left breast lumpectomy scar  No masses, nodularity, skin changes, nipple changes or discharge, or adenopathy appreciated on physical exam      Abdominal:      General: Abdomen is flat  Palpations: Abdomen is soft  Lymphadenopathy:      Upper Body:      Right upper body: No supraclavicular, axillary or pectoral adenopathy  Left upper body: No supraclavicular, axillary or pectoral adenopathy  Skin:     General: Skin is warm  Neurological:      General: No focal deficit present  Mental Status: She is alert and oriented to person, place, and time  Psychiatric:         Mood and Affect: Mood normal          Behavior: Behavior normal            Results:    Imaging  No results found  I reviewed the above imaging data  Advance Care Planning/Advance Directives:  Discussed disease status, cancer treatment plans and/or cancer treatment goals with the patient

## 2022-11-10 ENCOUNTER — OFFICE VISIT (OUTPATIENT)
Dept: OBGYN CLINIC | Facility: CLINIC | Age: 42
End: 2022-11-10

## 2022-11-10 VITALS
SYSTOLIC BLOOD PRESSURE: 128 MMHG | DIASTOLIC BLOOD PRESSURE: 83 MMHG | HEART RATE: 76 BPM | RESPIRATION RATE: 18 BRPM | HEIGHT: 67 IN | BODY MASS INDEX: 39.08 KG/M2 | WEIGHT: 249 LBS

## 2022-11-10 DIAGNOSIS — N93.9 ABNORMAL UTERINE BLEEDING (AUB): Primary | ICD-10-CM

## 2022-11-10 NOTE — ASSESSMENT & PLAN NOTE
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)  She will discuss with her oncologist (Dr Iona Siu) whether he would recommend oophorectomy given her hormonally sensitive breast cancer  In general, she would prefer not to go through surgical menopause if not medically recommended  · Consented for D&C, Hysteroscopy, Myomectomy (will be with symphion device)  Discussed risks of bleeding, pain, infection, perforation, injury to surrounding organs  We discussed same day procedure, post op recovery expectations  · Accepts intraoperative photos, CPR, blood, medical student involvement  · Aware that her case will be reviewed through the surgical scheduling committee, and she will receive a call once approved to schedule her H&P  She will need a pelvic exam at that H&P  · She prefers a Friday afternoon slot if possible to minimize time off work

## 2022-11-23 NOTE — QUICK NOTE
[facility] Surgical Case Review  Date Reviewed: 11/23/22  Reviewed by: Dr Edwige Nice, Dr Kay Owusu  Case request: D&C hysteroscopy, myomectomy  Indication: AUB, submucosal fibroid  Surgical Consent: 11/10/22  MA30/31: N/A    Case request approved: YES    Comments:  Case approved for surgery, goo candidate for Sepior system use    Tay Pires MD  PGY-4, OBGYN  11/23/22

## 2022-11-28 ENCOUNTER — PREP FOR PROCEDURE (OUTPATIENT)
Dept: OBGYN CLINIC | Facility: CLINIC | Age: 42
End: 2022-11-28

## 2022-11-28 DIAGNOSIS — N93.9 ABNORMAL UTERINE BLEEDING: Primary | ICD-10-CM

## 2022-11-28 DIAGNOSIS — D25.0 SUBMUCOUS LEIOMYOMA OF UTERUS: ICD-10-CM

## 2022-12-08 ENCOUNTER — CLINICAL SUPPORT (OUTPATIENT)
Dept: INTERNAL MEDICINE CLINIC | Facility: CLINIC | Age: 42
End: 2022-12-08

## 2022-12-08 DIAGNOSIS — R50.9 FEVER, UNSPECIFIED FEVER CAUSE: ICD-10-CM

## 2022-12-08 DIAGNOSIS — R05.9 COUGH, UNSPECIFIED TYPE: Primary | ICD-10-CM

## 2022-12-08 LAB
SARS-COV-2 AG UPPER RESP QL IA: POSITIVE
VALID CONTROL: ABNORMAL

## 2022-12-08 NOTE — PROGRESS NOTES
Patient seen today on nurse schedule for COVID test  Patient started with chest congestion , cough, sinus pressure, and low grade fever on evening of 12/7/22  Obtained COVID swab in office which came back positive  Spoke to Dr Fish Salazar who provided patient with a work note to return to work on 12/13 as long as symptoms are resoled and no fever  I advised patient of all the precautions and to call our office is symptoms worsen

## 2022-12-10 PROBLEM — Z11.1 TUBERCULOSIS SCREENING: Status: RESOLVED | Noted: 2022-10-11 | Resolved: 2022-12-10

## 2022-12-27 ENCOUNTER — OFFICE VISIT (OUTPATIENT)
Dept: OBGYN CLINIC | Facility: CLINIC | Age: 42
End: 2022-12-27

## 2022-12-27 VITALS
SYSTOLIC BLOOD PRESSURE: 139 MMHG | BODY MASS INDEX: 38.61 KG/M2 | RESPIRATION RATE: 18 BRPM | DIASTOLIC BLOOD PRESSURE: 87 MMHG | HEIGHT: 67 IN | HEART RATE: 101 BPM | WEIGHT: 246 LBS

## 2022-12-27 DIAGNOSIS — N93.9 ABNORMAL UTERINE BLEEDING (AUB): Primary | ICD-10-CM

## 2022-12-27 NOTE — PROGRESS NOTES
[facility]P  Name: Jose Manuel Landry  MRN: [id number]  : 1980      ASSESSMENT/PLAN:  Problem   Abnormal Uterine Bleeding (Aub)    Case reviewed by surgical committee on 22  Scheduled D&C hysteroscopy, myomectomy in the setting of AUB and submucosal fibroid  Surgical details reviewed, consent reviewed, signed 22  Declines pelvic exam in office, discussed and consented for exam under anesthesia during procedure  To OR for scheduled procedure on 23 with Dr Rehan Burroughs  [facility] for postoperative visit in 1-2 weeks after procedure           SUBJECTIVE:  Sailaja Victoria is a 35yo  who presents for surgical H&P for abnormal uterine bleeding with planned procedure on 23 for D&C hysteroscopy and myomectomy  Submucosal fibroid present on transvaginal ultrasound imaging 6/15/22  Patient reports previously taking TXA pills that helped decrease her heavy vaginal bleeding but has since stopped taking them  Her periods are now lasting 4-7 days with irregular intermenstrual spotting  We discussed that this procedure is diagnostic and may be therapeutic should the submucosal fibroid be the source of her bleeding  We discussed conventional medical management with hormonal therapy to which is contraindicated due to her history of breast cancer, estrogen and progesterone receptor positive ductal carcinoma in situ  Sailaja Victoria does report desire for definitive surgical management with hysterectomy, but understands the need for endometrial sampling that will be reviewed with pathology  The procedure was reviewed in detail during visitation today  We discussed exam under anesthesia and the cervical dilation process for introduction of the hysteroscope  We discussed complications of the procedure that although rare may occur during vaginal approach including uterine perforation and subsequent repair    We also discussed risks of bleeding, infection, and scarring that are present during any surgical procedure  Patient expressed understanding and plans to proceed with D&C hysteroscopy and myomectomy  OBJECTIVE:  Vitals:    12/27/22 1100   BP: 139/87   Pulse: 101   Resp: 18       Physical Exam  Vitals and nursing note reviewed  Constitutional:       General: She is not in acute distress  HENT:      Head: Normocephalic  Right Ear: External ear normal       Left Ear: External ear normal    Eyes:      General: No scleral icterus  Right eye: No discharge  Left eye: No discharge  Conjunctiva/sclera: Conjunctivae normal    Cardiovascular:      Rate and Rhythm: Normal rate and regular rhythm  Pulses: Normal pulses  Heart sounds: Normal heart sounds  Pulmonary:      Effort: Pulmonary effort is normal  No respiratory distress  Breath sounds: Normal breath sounds  Abdominal:      General: Abdomen is flat  There is no distension  Palpations: Abdomen is soft  Tenderness: There is no abdominal tenderness  There is no guarding  Genitourinary:     Comments: Declined per patient  Musculoskeletal:         General: No swelling or tenderness  Normal range of motion  Cervical back: Normal range of motion  Right lower leg: No edema  Left lower leg: No edema  Skin:     General: Skin is warm and dry  Capillary Refill: Capillary refill takes less than 2 seconds  Neurological:      Mental Status: She is alert and oriented to person, place, and time  Mental status is at baseline     Psychiatric:         Mood and Affect: Mood normal          Behavior: Behavior normal            Nikki Meléndez MD  OB/GYN PGY-2  12/27/2022  12:53 PM

## 2022-12-30 ENCOUNTER — TELEPHONE (OUTPATIENT)
Dept: OBGYN CLINIC | Facility: CLINIC | Age: 42
End: 2022-12-30

## 2022-12-30 ENCOUNTER — PREP FOR PROCEDURE (OUTPATIENT)
Dept: OBGYN CLINIC | Facility: CLINIC | Age: 42
End: 2022-12-30

## 2022-12-30 DIAGNOSIS — Z01.818 PRE-OP TESTING: ICD-10-CM

## 2022-12-30 DIAGNOSIS — D25.0 SUBMUCOUS LEIOMYOMA OF UTERUS: ICD-10-CM

## 2022-12-30 DIAGNOSIS — N93.9 ABNORMAL UTERINE BLEEDING (AUB): Primary | ICD-10-CM

## 2022-12-30 DIAGNOSIS — N93.9 ABNORMAL UTERINE BLEEDING: Primary | ICD-10-CM

## 2022-12-30 NOTE — TELEPHONE ENCOUNTER
Pt had to r/s her Sx due to testing pos for covid, now requesting a refill for TXA to help with her menses until she has her sx

## 2023-01-02 DIAGNOSIS — N92.0 EXCESSIVE AND FREQUENT MENSTRUATION WITH REGULAR CYCLE: ICD-10-CM

## 2023-01-03 RX ORDER — TRANEXAMIC ACID 650 MG/1
TABLET ORAL
Qty: 30 TABLET | Refills: 3 | Status: SHIPPED | OUTPATIENT
Start: 2023-01-03

## 2023-01-17 ENCOUNTER — TELEPHONE (OUTPATIENT)
Dept: HEMATOLOGY ONCOLOGY | Facility: CLINIC | Age: 43
End: 2023-01-17

## 2023-01-17 ENCOUNTER — TELEPHONE (OUTPATIENT)
Dept: SURGICAL ONCOLOGY | Facility: CLINIC | Age: 43
End: 2023-01-17

## 2023-01-17 NOTE — TELEPHONE ENCOUNTER
Appointment Cancellation Or Reschedule     Person calling in Patient    If other than patient calling, are they listed on the communication consent form? N/A   Provider Cosmo Smith, [facility]   Office Visit Date and Time 05/15/23 3:30PM   Office Visit Location [facility]   Did patient want to reschedule their office appointment? If so, when was it scheduled to? Yes 05/17/23 3:30PM   Did you have STAR scheduled for this appointment? No   Do you need STAR set up for your new appointment? If yes, please send to "PATIENT RIDESHARE" pool for STAR rescheduling N/A   If you are cancelling appointment, can we notify STAR to cancel ride? If yes, please send to "PATIENT RIDESHARE" pool for STAR to cancel service N/A   Is this patient calling to reschedule an infusion appointment? No   When is their next infusion appointment? N/A   Is this patient a Chemo patient? No   Reason for Cancellation or Reschedule Provider out of office     If the patient is a treatment patient, please route this to the office nurse  If the patient is not on treatment, please route to the Clerical pool based on location  If the patient is a surgical oncology patient, please route to surg/onc clinical pool  Route message as high priority if same day cancellation

## 2023-01-25 ENCOUNTER — APPOINTMENT (OUTPATIENT)
Dept: LAB | Facility: CLINIC | Age: 43
End: 2023-01-25

## 2023-01-25 ENCOUNTER — LAB REQUISITION (OUTPATIENT)
Dept: LAB | Facility: HOSPITAL | Age: 43
End: 2023-01-25

## 2023-01-25 DIAGNOSIS — Z01.818 ENCOUNTER FOR OTHER PREPROCEDURAL EXAMINATION: ICD-10-CM

## 2023-01-25 DIAGNOSIS — Z01.818 PRE-OP TESTING: ICD-10-CM

## 2023-01-25 LAB
ABO GROUP BLD: NORMAL
ALBUMIN SERPL BCP-MCNC: 4.2 G/DL (ref 3.5–5)
ALP SERPL-CCNC: 101 U/L (ref 34–104)
ALT SERPL W P-5'-P-CCNC: 53 U/L (ref 7–52)
ANION GAP SERPL CALCULATED.3IONS-SCNC: 7 MMOL/L (ref 4–13)
AST SERPL W P-5'-P-CCNC: 65 U/L (ref 13–39)
BASOPHILS # BLD AUTO: 0.06 THOUSANDS/ÂΜL (ref 0–0.1)
BASOPHILS NFR BLD AUTO: 1 % (ref 0–1)
BILIRUB SERPL-MCNC: 0.48 MG/DL (ref 0.2–1)
BLD GP AB SCN SERPL QL: NEGATIVE
BUN SERPL-MCNC: 9 MG/DL (ref 5–25)
CALCIUM SERPL-MCNC: 9.3 MG/DL (ref 8.4–10.2)
CHLORIDE SERPL-SCNC: 102 MMOL/L (ref 96–108)
CO2 SERPL-SCNC: 27 MMOL/L (ref 21–32)
CREAT SERPL-MCNC: 0.58 MG/DL (ref 0.6–1.3)
EOSINOPHIL # BLD AUTO: 0.43 THOUSAND/ÂΜL (ref 0–0.61)
EOSINOPHIL NFR BLD AUTO: 5 % (ref 0–6)
ERYTHROCYTE [DISTWIDTH] IN BLOOD BY AUTOMATED COUNT: 14.4 % (ref 11.6–15.1)
GFR SERPL CREATININE-BSD FRML MDRD: 112 ML/MIN/1.73SQ M
GLUCOSE SERPL-MCNC: 105 MG/DL (ref 65–140)
HCT VFR BLD AUTO: 34.4 % (ref 34.8–46.1)
HGB BLD-MCNC: 10.6 G/DL (ref 11.5–15.4)
IMM GRANULOCYTES # BLD AUTO: 0.05 THOUSAND/UL (ref 0–0.2)
IMM GRANULOCYTES NFR BLD AUTO: 1 % (ref 0–2)
LYMPHOCYTES # BLD AUTO: 2.87 THOUSANDS/ÂΜL (ref 0.6–4.47)
LYMPHOCYTES NFR BLD AUTO: 32 % (ref 14–44)
MCH RBC QN AUTO: 28 PG (ref 26.8–34.3)
MCHC RBC AUTO-ENTMCNC: 30.8 G/DL (ref 31.4–37.4)
MCV RBC AUTO: 91 FL (ref 82–98)
MONOCYTES # BLD AUTO: 0.55 THOUSAND/ÂΜL (ref 0.17–1.22)
MONOCYTES NFR BLD AUTO: 6 % (ref 4–12)
NEUTROPHILS # BLD AUTO: 5.16 THOUSANDS/ÂΜL (ref 1.85–7.62)
NEUTS SEG NFR BLD AUTO: 55 % (ref 43–75)
NRBC BLD AUTO-RTO: 0 /100 WBCS
PLATELET # BLD AUTO: 481 THOUSANDS/UL (ref 149–390)
PMV BLD AUTO: 9.8 FL (ref 8.9–12.7)
POTASSIUM SERPL-SCNC: 4 MMOL/L (ref 3.5–5.3)
PROT SERPL-MCNC: 8 G/DL (ref 6.4–8.4)
RBC # BLD AUTO: 3.78 MILLION/UL (ref 3.81–5.12)
RH BLD: POSITIVE
SODIUM SERPL-SCNC: 136 MMOL/L (ref 135–147)
SPECIMEN EXPIRATION DATE: NORMAL
WBC # BLD AUTO: 9.12 THOUSAND/UL (ref 4.31–10.16)

## 2023-01-26 ENCOUNTER — ANESTHESIA EVENT (OUTPATIENT)
Dept: PERIOP | Facility: HOSPITAL | Age: 43
End: 2023-01-26

## 2023-01-26 NOTE — ANESTHESIA PREPROCEDURE EVALUATION
Procedure:  DILATATION AND CURETTAGE (D&C) WITH HYSTEROSCOPY (Uterus)  MYOMECTOMY (Uterus)    Relevant Problems   ANESTHESIA (within normal limits)      CARDIO   (+) Pure hypercholesterolemia      ENDO (within normal limits)      GI/HEPATIC   (+) Gastroesophageal reflux disease   (+) Hepatic steatosis      /RENAL (within normal limits)      HEMATOLOGY (within normal limits)      NEURO/PSYCH   (+) Anxiety   (+) Encounter for follow-up examination after completed treatment for malignant neoplasm   (+) History of ductal carcinoma in situ (DCIS) of breast      PULMONARY (within normal limits)      Genitourinary   (+) Abnormal uterine bleeding (AUB)      Other   (+) Class 2 obesity due to excess calories without serious comorbidity with body mass index (BMI) of 39 0 to 39 9 in adult      EKG 8/2020:  Sinus bradycardia  Otherwise normal ECG  No previous ECGs available    Lab Results   Component Value Date    WBC 9 12 01/25/2023    HGB 10 6 (L) 01/25/2023    HCT 34 4 (L) 01/25/2023    MCV 91 01/25/2023     (H) 01/25/2023     Lab Results   Component Value Date    SODIUM 136 01/25/2023    K 4 0 01/25/2023     01/25/2023    CO2 27 01/25/2023    BUN 9 01/25/2023    CREATININE 0 58 (L) 01/25/2023    GLUC 105 01/25/2023    CALCIUM 9 3 01/25/2023     Lab Results   Component Value Date    INR 0 97 09/21/2020    PROTIME 12 9 09/21/2020    PROTIME 12 9 09/21/2020     No results found for: HGBA1C       Physical Exam    Airway    Mallampati score: II         Dental   No notable dental hx     Cardiovascular  Cardiovascular exam normal    Pulmonary  Pulmonary exam normal     Other Findings        Anesthesia Plan  ASA Score- 2     Anesthesia Type- general with ASA Monitors  Additional Monitors:   Airway Plan: LMA      Comment: Discussed with patient plan for anesthesia, as well as risks/benefits, including likely chance of PONV and sore throat, as well as rare possibilities of dental/oropharyngeal/ocular injuries, aspiration, and severe/life-threatening surgical and anesthetic emergencies  Patient expressed understanding and agreement          Plan Factors-Exercise tolerance (METS): >4 METS  Chart reviewed  Existing labs reviewed  Patient summary reviewed  Induction- intravenous  Postoperative Plan-     Informed Consent- Anesthetic plan and risks discussed with patient  I personally reviewed this patient with the CRNA  Discussed and agreed on the Anesthesia Plan with the CRNA  Angelina Schwartz

## 2023-01-27 ENCOUNTER — HOSPITAL ENCOUNTER (OUTPATIENT)
Facility: HOSPITAL | Age: 43
Setting detail: OUTPATIENT SURGERY
Discharge: HOME | End: 2023-01-27
Attending: OBSTETRICS & GYNECOLOGY | Admitting: OBSTETRICS & GYNECOLOGY

## 2023-01-27 ENCOUNTER — ANESTHESIA (OUTPATIENT)
Dept: PERIOP | Facility: HOSPITAL | Age: 43
End: 2023-01-27

## 2023-01-27 VITALS
RESPIRATION RATE: 14 BRPM | HEIGHT: 67 IN | BODY MASS INDEX: 38.45 KG/M2 | SYSTOLIC BLOOD PRESSURE: 119 MMHG | HEART RATE: 83 BPM | WEIGHT: 245 LBS | OXYGEN SATURATION: 100 % | TEMPERATURE: 97.1 F | DIASTOLIC BLOOD PRESSURE: 59 MMHG

## 2023-01-27 DIAGNOSIS — N93.9 ABNORMAL UTERINE BLEEDING: ICD-10-CM

## 2023-01-27 DIAGNOSIS — D25.0 SUBMUCOUS LEIOMYOMA OF UTERUS: ICD-10-CM

## 2023-01-27 DIAGNOSIS — Z98.890 S/P DILATATION AND CURETTAGE: Primary | ICD-10-CM

## 2023-01-27 LAB
EXT PREGNANCY TEST URINE: NEGATIVE
EXT. CONTROL: NORMAL

## 2023-01-27 RX ORDER — METOCLOPRAMIDE HYDROCHLORIDE 5 MG/ML
10 INJECTION INTRAMUSCULAR; INTRAVENOUS ONCE AS NEEDED
Status: COMPLETED | OUTPATIENT
Start: 2023-01-27 | End: 2023-01-27

## 2023-01-27 RX ORDER — PROPOFOL 10 MG/ML
INJECTION, EMULSION INTRAVENOUS AS NEEDED
Status: DISCONTINUED | OUTPATIENT
Start: 2023-01-27 | End: 2023-01-27

## 2023-01-27 RX ORDER — DOCUSATE SODIUM 100 MG/1
100 CAPSULE, LIQUID FILLED ORAL 2 TIMES DAILY
Status: DISCONTINUED | OUTPATIENT
Start: 2023-01-27 | End: 2023-01-27 | Stop reason: HOSPADM

## 2023-01-27 RX ORDER — FENTANYL CITRATE 50 UG/ML
INJECTION, SOLUTION INTRAMUSCULAR; INTRAVENOUS AS NEEDED
Status: DISCONTINUED | OUTPATIENT
Start: 2023-01-27 | End: 2023-01-27

## 2023-01-27 RX ORDER — MIDAZOLAM HYDROCHLORIDE 2 MG/2ML
INJECTION, SOLUTION INTRAMUSCULAR; INTRAVENOUS AS NEEDED
Status: DISCONTINUED | OUTPATIENT
Start: 2023-01-27 | End: 2023-01-27

## 2023-01-27 RX ORDER — LIDOCAINE HYDROCHLORIDE 10 MG/ML
INJECTION, SOLUTION EPIDURAL; INFILTRATION; INTRACAUDAL; PERINEURAL AS NEEDED
Status: DISCONTINUED | OUTPATIENT
Start: 2023-01-27 | End: 2023-01-27

## 2023-01-27 RX ORDER — HYDROMORPHONE HCL/PF 1 MG/ML
0.4 SYRINGE (ML) INJECTION ONCE
Status: DISCONTINUED | OUTPATIENT
Start: 2023-01-27 | End: 2023-01-27 | Stop reason: HOSPADM

## 2023-01-27 RX ORDER — IBUPROFEN 200 MG
600 TABLET ORAL EVERY 6 HOURS PRN
Start: 2023-01-27

## 2023-01-27 RX ORDER — ACETAMINOPHEN 325 MG/1
650 TABLET ORAL EVERY 6 HOURS PRN
Refills: 0
Start: 2023-01-27

## 2023-01-27 RX ORDER — SODIUM CHLORIDE, SODIUM LACTATE, POTASSIUM CHLORIDE, CALCIUM CHLORIDE 600; 310; 30; 20 MG/100ML; MG/100ML; MG/100ML; MG/100ML
INJECTION, SOLUTION INTRAVENOUS CONTINUOUS PRN
Status: DISCONTINUED | OUTPATIENT
Start: 2023-01-27 | End: 2023-01-27

## 2023-01-27 RX ORDER — DEXAMETHASONE SODIUM PHOSPHATE 10 MG/ML
INJECTION, SOLUTION INTRAMUSCULAR; INTRAVENOUS AS NEEDED
Status: DISCONTINUED | OUTPATIENT
Start: 2023-01-27 | End: 2023-01-27

## 2023-01-27 RX ORDER — FENTANYL CITRATE/PF 50 MCG/ML
25 SYRINGE (ML) INJECTION
Status: DISCONTINUED | OUTPATIENT
Start: 2023-01-27 | End: 2023-01-27 | Stop reason: HOSPADM

## 2023-01-27 RX ORDER — PROMETHAZINE HYDROCHLORIDE 25 MG/ML
12.5 INJECTION, SOLUTION INTRAMUSCULAR; INTRAVENOUS ONCE AS NEEDED
Status: COMPLETED | OUTPATIENT
Start: 2023-01-27 | End: 2023-01-27

## 2023-01-27 RX ORDER — GLYCOPYRROLATE 0.2 MG/ML
INJECTION INTRAMUSCULAR; INTRAVENOUS AS NEEDED
Status: DISCONTINUED | OUTPATIENT
Start: 2023-01-27 | End: 2023-01-27

## 2023-01-27 RX ORDER — ONDANSETRON 2 MG/ML
4 INJECTION INTRAMUSCULAR; INTRAVENOUS ONCE AS NEEDED
Status: COMPLETED | OUTPATIENT
Start: 2023-01-27 | End: 2023-01-27

## 2023-01-27 RX ORDER — SODIUM CHLORIDE, SODIUM LACTATE, POTASSIUM CHLORIDE, CALCIUM CHLORIDE 600; 310; 30; 20 MG/100ML; MG/100ML; MG/100ML; MG/100ML
125 INJECTION, SOLUTION INTRAVENOUS CONTINUOUS
Status: DISCONTINUED | OUTPATIENT
Start: 2023-01-27 | End: 2023-01-27 | Stop reason: HOSPADM

## 2023-01-27 RX ORDER — ONDANSETRON 2 MG/ML
INJECTION INTRAMUSCULAR; INTRAVENOUS AS NEEDED
Status: DISCONTINUED | OUTPATIENT
Start: 2023-01-27 | End: 2023-01-27

## 2023-01-27 RX ORDER — KETOROLAC TROMETHAMINE 30 MG/ML
INJECTION, SOLUTION INTRAMUSCULAR; INTRAVENOUS AS NEEDED
Status: DISCONTINUED | OUTPATIENT
Start: 2023-01-27 | End: 2023-01-27

## 2023-01-27 RX ORDER — ACETAMINOPHEN 325 MG/1
975 TABLET ORAL EVERY 6 HOURS PRN
Status: DISCONTINUED | OUTPATIENT
Start: 2023-01-27 | End: 2023-01-27 | Stop reason: HOSPADM

## 2023-01-27 RX ORDER — MAGNESIUM HYDROXIDE 1200 MG/15ML
LIQUID ORAL AS NEEDED
Status: DISCONTINUED | OUTPATIENT
Start: 2023-01-27 | End: 2023-01-27 | Stop reason: HOSPADM

## 2023-01-27 RX ORDER — ONDANSETRON 2 MG/ML
4 INJECTION INTRAMUSCULAR; INTRAVENOUS EVERY 6 HOURS PRN
Status: DISCONTINUED | OUTPATIENT
Start: 2023-01-27 | End: 2023-01-27 | Stop reason: HOSPADM

## 2023-01-27 RX ADMIN — ONDANSETRON 4 MG: 2 INJECTION INTRAMUSCULAR; INTRAVENOUS at 12:52

## 2023-01-27 RX ADMIN — FENTANYL CITRATE 25 MCG: 50 INJECTION INTRAMUSCULAR; INTRAVENOUS at 14:00

## 2023-01-27 RX ADMIN — FENTANYL CITRATE 25 MCG: 50 INJECTION INTRAMUSCULAR; INTRAVENOUS at 13:57

## 2023-01-27 RX ADMIN — SODIUM CHLORIDE, SODIUM LACTATE, POTASSIUM CHLORIDE, AND CALCIUM CHLORIDE: .6; .31; .03; .02 INJECTION, SOLUTION INTRAVENOUS at 12:45

## 2023-01-27 RX ADMIN — GLYCOPYRROLATE 0.1 MG: 0.2 INJECTION INTRAMUSCULAR; INTRAVENOUS at 12:52

## 2023-01-27 RX ADMIN — KETOROLAC TROMETHAMINE 30 MG: 30 INJECTION, SOLUTION INTRAMUSCULAR; INTRAVENOUS at 14:05

## 2023-01-27 RX ADMIN — LIDOCAINE HYDROCHLORIDE 50 MG: 10 INJECTION, SOLUTION EPIDURAL; INFILTRATION; INTRACAUDAL; PERINEURAL at 12:52

## 2023-01-27 RX ADMIN — ONDANSETRON 4 MG: 2 INJECTION INTRAMUSCULAR; INTRAVENOUS at 14:21

## 2023-01-27 RX ADMIN — PROMETHAZINE HYDROCHLORIDE 12.5 MG: 25 INJECTION INTRAMUSCULAR; INTRAVENOUS at 15:07

## 2023-01-27 RX ADMIN — DEXAMETHASONE SODIUM PHOSPHATE 10 MG: 10 INJECTION INTRAMUSCULAR; INTRAVENOUS at 12:52

## 2023-01-27 RX ADMIN — FENTANYL CITRATE 25 MCG: 50 INJECTION INTRAMUSCULAR; INTRAVENOUS at 12:58

## 2023-01-27 RX ADMIN — FENTANYL CITRATE 25 MCG: 50 INJECTION INTRAMUSCULAR; INTRAVENOUS at 15:11

## 2023-01-27 RX ADMIN — FENTANYL CITRATE 25 MCG: 50 INJECTION INTRAMUSCULAR; INTRAVENOUS at 14:44

## 2023-01-27 RX ADMIN — FENTANYL CITRATE 25 MCG: 50 INJECTION INTRAMUSCULAR; INTRAVENOUS at 14:37

## 2023-01-27 RX ADMIN — METOCLOPRAMIDE 10 MG: 5 INJECTION, SOLUTION INTRAMUSCULAR; INTRAVENOUS at 14:33

## 2023-01-27 RX ADMIN — FENTANYL CITRATE 25 MCG: 50 INJECTION INTRAMUSCULAR; INTRAVENOUS at 13:12

## 2023-01-27 RX ADMIN — PROPOFOL 200 MG: 10 INJECTION, EMULSION INTRAVENOUS at 12:52

## 2023-01-27 RX ADMIN — MIDAZOLAM 2 MG: 1 INJECTION INTRAMUSCULAR; INTRAVENOUS at 12:43

## 2023-01-27 NOTE — OP NOTE
OPERATIVE REPORT  PATIENT NAME: Montez García    :  1980  MRN: [id number]  Pt Location: [facility] OR ROOM 06    SURGERY DATE: 2023    Surgeon(s) and Role:     * Wen Becker MD - Primary     * Mary Kay Masters DO - Assisting    Preop Diagnosis:  Abnormal uterine bleeding [N93 9]  Submucous leiomyoma of uterus [D25 0]    Post-Op Diagnosis Codes:     * Abnormal uterine bleeding [N93 9]     * Submucous leiomyoma of uterus [D25 0]    Procedure(s):  DILATATION AND CURETTAGE (D&C) WITH HYSTEROSCOPY  HYSTEROSCOPY WITH  RESECTION UTERINE TUMOR/FIBROID (SYMPHION)    Specimen(s):  ID Type Source Tests Collected by Time Destination   1 : [facility] Tissue Endometrium TISSUE EXAM Wen Becker MD 2023 1401    2 : [facility] Tissue Other TISSUE EXAM Wen Becker MD 2023 1402    3 : FIBROID WITH POLYP FORCEPS Tissue Other TISSUE EXAM Wen Becker MD 2023 1405        Estimated Blood Loss:   Minimal    Drains:  * No LDAs found *    Anesthesia Type:   General    Operative Indications:  Abnormal uterine bleeding [N93 9]  Submucous leiomyoma of uterus [D25 0]    Operative Findings:   1  External genitalia grossly normal in appearance  No ulcerations, no lacerations, no lesions  Bimanual exam revealed anteverted uterus with normal contours and freely mobile  No adnexal masses palpated bilaterally  2   Vagina and cervix were grossly normal in appearance without any lacerations or lesions  Mild cystocele noted  Mild rectocele noted  3  Uterus sounded to 9 cm  4  Hysteroscopic examination revealed proliferative endometrial lining  Large pedunculated fibroid noted that was visually covering internal os on initial hysteroscopic evaluation  Significant portion of fibroid base was removed with symphion resectoscope  Portion of remaining fibroid was removed with polyp forceps and sharp curettage  Left tubal ostia was visualized  Complications:   None apparent    Procedure and Technique:     The patient was taken to the operating room  General LMA anesthesia (LMA) was administered  Sequential compression devices were placed, and the patient was positioned on the operating table in the dorsal lithotomy position  All pressure points were padded, and a vikki hugger was placed to maintain control of core body temperature  A bimanual exam was performed, and the uterus was anteverted and normal in size and consistency with no palpable uterine or adnexal masses  The patient was prepped and draped in the usual sterile fashion using chlorhexidine  A time out was performed to confirm correct patient and procedure  A weighted speculum was inserted into the vagina, and a Isela retractor was used to visualize the anterior lip of the cervix  A double toothed tenaculum was used to grasp the anterior lip of the cervix  The Farwell retractor was removed  The uterus was sounded to 9 cm  The cervix was serially dilated to 19 [place] using Hanks dilators for introduction of the hysteroscope  Hysteroscope was introduced under direct visualization using normal saline solution as the distention media  The weighted speculum was removed from the vagina  The hysteroscope was advanced to the fundus of the uterus, and the entire uterine cavity was inspected in a systematic manner  There was noted to be proliferative endometrium with fibroid as described in the operative findings  The Symphion resection instrument was inserted through the operative channel and advanced slowly until the tip of the instrument was visualized in the cavity  A second tenaculum was placed on posterior aspect of the cervix assist with preventing fluid from being expelled through external os  Using the Symphion device under direct hysteroscopic visualization, the above mentioned fibroid was partially excised with significant portion removed from base of fibroid  Good hemostasis was noted    The fundus was noted to be in tact and left tubal ostia was visualized  The myomectomy with Symphion device was ceased after approaching maximum for fluid deficit  The hysteroscope was removed, and the collected specimen was sent to pathology  After removal of the hysteroscope, the weighted speculum was replaced and polyp forceps were introduced to attempt to removed remaining fibroid  After multiple attempts of removing fibroid, Polyp forceps were removed and collected specimen was sent to pathology  Then sharp curetting was performed, starting at the 12 o'clock position and rotating a total of 360 degrees to cover all surfaces until uterine cry was noted in all aspects  Endometrial tissue was obtained and sent for pathology  The double toothed tenaculum was removed from the anterior lip of the cervix  Two puncture sites from tenaculum were visualized and a small amount of bleeding was  noted  Pressure was applied with ringed forceps  The patient had excellent hemostasis at this time  The Sterling retractor and the weighted speculum were removed from the vagina  She was cleansed and was awakened from anesthesia without incident and was taken to the recovery room in satisfactory condition  At the conclusion of the procedure, all needle, sponge, and instrument counts were correct x2  The fluid deficit was noted to 2100 mL  Dr Yury Funes was present for the entirety of the procedure       Patient Disposition:  PACU     SIGNATURE: Anselmo Oglesby DO  DATE: January 27, 2023  TIME: 2:19 PM

## 2023-01-27 NOTE — ANESTHESIA POSTPROCEDURE EVALUATION
Post-Op Assessment Note    CV Status:  Stable  Pain Score: 0    Pain management: adequate     Mental Status:  Alert and awake   Hydration Status:  Euvolemic   PONV Controlled:  Controlled   Airway Patency:  Patent      Post Op Vitals Reviewed: Yes      Staff: CRNA         No notable events documented      BP     Temp 97 8 °F (36 6 °C) (01/27/23 1420)    Pulse 85 (01/27/23 1420)   Resp      SpO2   94

## 2023-01-27 NOTE — H&P
H&P Exam - Gynecology   Eb Coles 38 y o  female MRN: [id number]  Unit/Bed#: OR POOL Encounter: [id number]    Assessment/Plan     Assessment:  David Ge is a 38 y o  female presenting for scheduled hysteroscopy, D&C and myomectomy secondary to abnormal uterine bleeding    Plan:  Proceed to OR for procedure  Plan for D/C same day of surgery    History of Present Illness     HPI:  David Ge is a 38 y o  female who presents this afternoon for her scheduled hysteroscopy, D&C and myomectomy secondary to abnormal uterine bleeding  Please see pre-op H&P from 12/27/2022 for further history and details  Reports no changes to her health since prior evaluation    Review of Systems   Constitutional: Negative for chills and fever  HENT: Positive for sinus pressure  Negative for ear pain and sore throat  Eyes: Negative for pain and visual disturbance  Respiratory: Negative for cough and shortness of breath  Cardiovascular: Negative for chest pain and palpitations  Gastrointestinal: Negative for abdominal pain and vomiting  Genitourinary: Positive for vaginal bleeding  Negative for dysuria and hematuria  Vaginal spotting since Menses on 1/10   Musculoskeletal: Negative for arthralgias and back pain  Skin: Negative for color change and rash  Neurological: Negative for seizures and syncope  All other systems reviewed and are negative        Historical Information   Past Medical History:   Diagnosis Date   • Abnormal Pap smear of cervix    • Anxiety 01/2015    Last assessed   • BRCA gene mutation negative 07/24/2020    [employer]ae 20 gene panel   • Breast cancer ([facility] [address] ) 08/28/2020    left breast DCIS   • Fatty liver    • HPV (human papilloma virus) infection     unsure     Past Surgical History:   Procedure Laterality Date   • BREAST BIOPSY Left 07/20/2020    DCIS   • BREAST CYST EXCISION Left 08/2020    DCIS   • BREAST LUMPECTOMY Left 8/28/2020    Procedure: BREAST NEEDLE LOCALIZED LUMPECTOMY (NEEDLE LOC AT 0900); Surgeon: Angélica Giles MD;  Location: [facility];  Service: Surgical Oncology   • CHOLECYSTECTOMY     • CHOLECYSTECTOMY LAPAROSCOPIC  01/2015    Last assessed   • COLPOSCOPY     • MAMMO NEEDLE LOCALIZATION LEFT (ALL INC) Left 8/28/2020   • MAMMO STEREOTACTIC BREAST BIOPSY LEFT (ALL INC) Left 7/20/2020   • MOUTH SURGERY       OB/GYN History:   Family History   Problem Relation Age of Onset   • Heart attack Father         Acute   • Heart failure Father    • Coronary artery disease Father    • Diabetes Father         DM   • Hypertension Father    • No Known Problems Maternal Grandmother    • Alzheimer's disease Maternal Grandfather    • Stroke Paternal Grandmother    • Prostate cancer Paternal Grandfather 76   • Cancer Paternal Grandfather    • No Known Problems Mother    • No Known Problems Son    • No Known Problems Sister    • No Known Problems Half-Sister    • No Known Problems Half-Sister    • No Known Problems Brother    • No Known Problems Maternal Uncle    • No Known Problems Maternal Uncle    • No Known Problems Maternal Aunt    • No Known Problems Maternal Aunt    • No Known Problems Maternal Aunt    • No Known Problems Paternal Uncle    • No Known Problems Paternal Uncle      Social History   Social History     Substance and Sexual Activity   Alcohol Use Not Currently   • Alcohol/week: 0 0 standard drinks     Social History     Substance and Sexual Activity   Drug Use Not Currently   • Types: Marijuana     Social History     Tobacco Use   Smoking Status Former   • Packs/day: 1 00   • Years: 25 00   • Pack years: 25 00   • Types: Cigarettes   • Quit date: 4/8/2019   • Years since quitting: 3 8   Smokeless Tobacco Never     E-Cigarette/Vaping   • E-Cigarette Use Former User      E-Cigarette/Vaping Substances   • Nicotine Yes    • THC No    • CBD No    • Flavoring No    • Other No        Meds/Allergies   current meds:   No current facility-administered medications for this encounter  Allergies   Allergen Reactions   • Seasonal Ic [Cholestatin] Cough       Objective   Vitals: Blood pressure (!) 165/106, pulse 75, temperature (!) 97 °F (36 1 °C), temperature source Tympanic, resp  rate 18, height 5' 7" (1 702 m), weight 111 kg (245 lb), not currently breastfeeding  No intake or output data in the 24 hours ending 01/27/23 1208    Invasive Devices: Invasive Devices     None                 Physical Exam  Vitals and nursing note reviewed  Constitutional:       General: She is not in acute distress  Appearance: She is well-developed  HENT:      Head: Normocephalic and atraumatic  Right Ear: External ear normal       Left Ear: External ear normal       Nose: Congestion present  Mouth/Throat:      Pharynx: Oropharynx is clear  Eyes:      Conjunctiva/sclera: Conjunctivae normal    Cardiovascular:      Rate and Rhythm: Normal rate and regular rhythm  Heart sounds: No murmur heard  Pulmonary:      Effort: Pulmonary effort is normal  No respiratory distress  Breath sounds: Normal breath sounds  Abdominal:      Palpations: Abdomen is soft  Tenderness: There is no abdominal tenderness  Musculoskeletal:         General: No swelling  Cervical back: Neck supple  Skin:     General: Skin is warm and dry  Capillary Refill: Capillary refill takes less than 2 seconds  Neurological:      General: No focal deficit present  Mental Status: She is alert  Mental status is at baseline  Motor: No weakness  Psychiatric:         Mood and Affect: Mood normal          Judgment: Judgment normal        Lab Results: I have personally reviewed pertinent reports  Imaging: I have personally reviewed pertinent reports  EKG, Pathology, and Other Studies: I have personally reviewed pertinent reports        D/w Dr Valeria Mcnamara, DO  OB/GYN PGY-1

## 2023-02-17 DIAGNOSIS — E27.8 ADRENAL NODULE (HCC): Primary | ICD-10-CM

## 2023-02-20 DIAGNOSIS — E78.00 PURE HYPERCHOLESTEROLEMIA: Chronic | ICD-10-CM

## 2023-02-21 RX ORDER — ATORVASTATIN CALCIUM 20 MG/1
TABLET, FILM COATED ORAL
Qty: 90 TABLET | Refills: 3 | Status: SHIPPED | OUTPATIENT
Start: 2023-02-21

## 2023-02-26 NOTE — PROGRESS NOTES
Y[place]  [facility] [address] 38 y o  SUBJECTIVE    CC:  "I feel great"    HPI: Karolina Marquez is a 38 y o  Deandra Hammock presenting today for postoperative visit  She underwent Hysteroscopy, D&C, hysteroscopic myomectomy on 1/27/23 for AUB with submucosal fibroid  Procedure was notable for fluid deficit of 2100mL due to prolonged resection of calcified fibroid  Findings were notable for intracavitary myoma on broad short stalk, no uterine prolapse noted  Pathology showed weakly proliferative endometrium, myomectomy specimen without atypia or carcinoma  She is considering eventual hysterectomy if her bleeding gets worse  She has been recovering well  Her discharge is resolved, her pain is resolved, and she is feeling well overall  She has started getting her period, a little heavier than normal, starting 2/18/23     She is using barrier/abstinence for contraception  Her last pap smear was 06/02/2020   She has a personal history of hormone receptor positive breast cancer    OBJECTIVE  Vitals:    02/27/23 1343   BP: 154/96   BP Location: Right arm   Patient Position: Sitting   Cuff Size: Large   Pulse: 81   Resp: 18   Weight: 112 kg (248 lb)   Height: 5' 7" (1 702 m)         Physical Exam  Constitutional:       General: She is not in acute distress  Appearance: She is obese  She is not ill-appearing or toxic-appearing  HENT:      Head: Normocephalic and atraumatic  Eyes:      Conjunctiva/sclera: Conjunctivae normal    Cardiovascular:      Pulses: Normal pulses  Pulmonary:      Effort: Pulmonary effort is normal  No respiratory distress  Abdominal:      General: There is no distension  Palpations: Abdomen is soft  Tenderness: There is no abdominal tenderness  There is no guarding or rebound  Musculoskeletal:         General: Normal range of motion  Cervical back: Normal range of motion  Right lower leg: No edema  Left lower leg: No edema     Neurological: General: No focal deficit present  Mental Status: She is alert  Mental status is at baseline  Skin:     General: Skin is warm and dry  Psychiatric:         Mood and Affect: Mood normal          Behavior: Behavior normal              ASSESSMENT    Helio Mccullough is a 38 y o  y o  Cheng Ina now 4 weeks postoperatively from a hysteroscopy, D&C, and hysteroscopic myomectomy for AUB, with specimen showing benign findings, doing well  PLAN  1  Reviewed pathology of specimen and findings from surgery  2  Return to care for annual exam  Discussed expectation that surgery would improve menses  Can consider other management options as needed going forward  Next pap smear due 6/2025 per guidelines    All questions were answered & patient expressed understanding      Patient was discussed with Dr Jerri John MD  OBGYN R-4  2/27/2023

## 2023-02-27 ENCOUNTER — OFFICE VISIT (OUTPATIENT)
Dept: OBGYN CLINIC | Facility: CLINIC | Age: 43
End: 2023-02-27

## 2023-02-27 VITALS
WEIGHT: 248 LBS | RESPIRATION RATE: 18 BRPM | DIASTOLIC BLOOD PRESSURE: 96 MMHG | BODY MASS INDEX: 38.92 KG/M2 | SYSTOLIC BLOOD PRESSURE: 154 MMHG | HEIGHT: 67 IN | HEART RATE: 81 BPM

## 2023-02-27 DIAGNOSIS — Z09 POSTOPERATIVE EXAMINATION: Primary | ICD-10-CM

## 2023-02-27 DIAGNOSIS — N93.9 ABNORMAL UTERINE BLEEDING (AUB): ICD-10-CM

## 2023-03-01 ENCOUNTER — TELEPHONE (OUTPATIENT)
Dept: INTERNAL MEDICINE CLINIC | Facility: CLINIC | Age: 43
End: 2023-03-01

## 2023-03-01 NOTE — TELEPHONE ENCOUNTER
This patient is scheduled for Abdomen and Pelvis CT   on 03/04/2023  The information submitted was not enough to get it approved, so [facility] is requesting a [id number]  If you are able to do so, the number is below       Specifically they are asking for: Indication for pelvis imaging     If you wish to withdraw this request let us know so we can call central scheduling to cancel their upcoming appointment  ELIEZER: [phone]  Tracking Number:[id number]  Insurance: [facility]  Member ID#: [id number]  Attending  NPI [id number],  Dr Nj Medina  Case is time sensitive please provide written response within 24 hrs of this message       Please let me know and thank you

## 2023-03-02 NOTE — TELEPHONE ENCOUNTER
Patient called to get the status update regarding approval for her CT scan she also received a letter regarding more info  Patient will be canceling her CT that was scheduled on 3/04/23 until she gets the okay that its approved or not

## 2023-03-07 NOTE — TELEPHONE ENCOUNTER
I contacted ELIEZER as noted below  They stated it is currently under medical review by their physician and no other action is needed at this time   Thank you

## 2023-03-10 NOTE — TELEPHONE ENCOUNTER
The authorization has been denied  Please see below:  ______________________________________________  This is your copy of the letter sent to [employer]  As the ordering provider, if you would like to discuss or clarify this decision with a physician reviewer, you may call us at [phone], Monday through Friday, from 8 a m  to 8 p m    ______________________________________________  Andrew Doshi has reviewed the request for Abdomen and Pelvis CT (Computed Tomography - pictures of inside your belly area) submitted by Dr Som Garcia for you on February 23, 2023  After physician review, the request is: Denied     Your request was denied completely because: Your doctor’s request is denied as not medically necessary  Your doctor has asked us to review the request for Computed Tomography - pictures of inside your belly area called a(n) Abdomen and Pelvis CT  Your doctor provided the following clinical information: you have an upper belly problem  Based on this information, the request cannot be approved  Your medical records show that you do not have a lower belly problem  Your problem is in your upper belly  Your doctor can think about doing pictures of your upper belly (Abdomen CT (Computed Tomography)) alone   ______________________________________________    Peer to Peer can be completed:    ELIEZER: [phone]  Tracking [id number]  Insurance: Andrew Doshi  Member ID#: [id number]  Attending  NPI [id number],  Dr Som Garcia

## 2023-03-11 DIAGNOSIS — E27.8 ADRENAL NODULE (HCC): Primary | ICD-10-CM

## 2023-04-01 ENCOUNTER — HOSPITAL ENCOUNTER (OUTPATIENT)
Dept: CT IMAGING | Facility: HOSPITAL | Age: 43
Discharge: HOME | End: 2023-04-01

## 2023-04-01 DIAGNOSIS — E27.8 ADRENAL NODULE (HCC): ICD-10-CM

## 2023-05-11 ENCOUNTER — TELEPHONE (OUTPATIENT)
Dept: HEMATOLOGY ONCOLOGY | Facility: CLINIC | Age: 43
End: 2023-05-11

## 2023-05-11 NOTE — TELEPHONE ENCOUNTER
Appointment Change  Cancel, Reschedule, Change to Virtual      Who are you speaking with? Patient   If it is not the patient, are they listed on an active communication consent form? N/A   Which provider is the appointment scheduled with? [facility]   When is the appointment scheduled? Please list date and time  5/17/23 3:30PM   At which location is the appointment scheduled to take place? [facility]   Was the appointment rescheduled or changed from an in person visit to a virtual visit? If so, please list the details of the change  5/22/23 11:30AM   What is the reason for the appointment change? Patient is not able to leave work   Was [facility] transport scheduled for this visit? No   Does [facility] transport need to be scheduled for the new visit (if applicable) No   Does the patient need an infusion appointment rescheduled? No   Does the patient have an infusion appointment scheduled? If so, when? No   Is the patient undergoing chemotherapy? No   Was the no-show policy reviewed for appointments being changed with less then 24 hours of notice?  Yes

## 2023-05-22 ENCOUNTER — OFFICE VISIT (OUTPATIENT)
Dept: SURGICAL ONCOLOGY | Facility: CLINIC | Age: 43
End: 2023-05-22

## 2023-05-22 VITALS
OXYGEN SATURATION: 99 % | DIASTOLIC BLOOD PRESSURE: 82 MMHG | TEMPERATURE: 98.3 F | SYSTOLIC BLOOD PRESSURE: 140 MMHG | HEIGHT: 67 IN | RESPIRATION RATE: 15 BRPM | HEART RATE: 70 BPM | BODY MASS INDEX: 38.92 KG/M2 | WEIGHT: 248 LBS

## 2023-05-22 DIAGNOSIS — Z08 ENCOUNTER FOR FOLLOW-UP EXAMINATION AFTER COMPLETED TREATMENT FOR MALIGNANT NEOPLASM: Primary | ICD-10-CM

## 2023-05-22 DIAGNOSIS — Z86.000 HISTORY OF DUCTAL CARCINOMA IN SITU (DCIS) OF BREAST: ICD-10-CM

## 2023-05-22 NOTE — PROGRESS NOTES
Surgical Oncology Follow Up       [address]  [facility] [place]  [address] [facility] [place]  [facility] [address]    Sonja Estes Beil  1980  [id number]  [address] [facility]  [facility] [place]  [address] [address]    Chief Complaint   Patient presents with   • Follow-up       Assessment/Plan:  1  Encounter for follow-up examination after completed treatment for malignant neoplasm  - 6 month follow up    2  History of ductal carcinoma in situ (DCIS) of breast      Discussion/Summary: Patient is a 57-year-old female presenting today for six-month follow-up for left breast cancer diagnosed in July 2020  Pathology revealed DCIS ER/MS+  She underwent genetic testing which was negative  She had a left breast lumpectomy with Dr Yasmin Rosa  She did not need RT and she denied tamoxifen  She is scheduled for a mammogram in July  There were no concerns on her cbe  I will see the patient back in 6 months or sooner should the need arise  She was instructed to call with any questions or concerns prior to this time  All questions were answered today  History of Present Illness:     Oncology History   History of ductal carcinoma in situ (DCIS) of breast   7/20/2020 Biopsy    Left breast stereotactic biopsy  11 o'clock, anterior depth  Ductal carcinoma in situ  Grade 2        Concordant  Malignancy appears unifocal  Calcifications spanned 5 mm  Right breast clear  7/24/2020 Genetic Testing    The following genes were evaluated: AARON, BRCA1, BRCA2, CDH1, CHEK2, PALB2, PTEN, STK11, TP53  Additional genes were analyzed for a total of 20 genes  Negative result   No pathogenic sequence variants or deletions/dupllications identified  [employer]     8/28/2020 Surgery    Left breast needle localized lumpectomy  Ductal carcinoma in situ  Grade 2  Size cannot be determined (5/34 blocks)  Margins negative  Stage 0     9/14/2020 Genomic Testing    DCISion RT low risk  10 year total risk with breast conserving surgery alone 7%  10 year total risk with breast conserving surgery and radiation 7%     10/27/2020 - 10/27/2020 Hormone Therapy    Consult with Dr Masoud Sharma Tamoxifen          -Interval History: Patient is a 75-year-old female presenting today for six-month follow-up for left breast cancer diagnosed in July 2020  She has been on observation  Patient denies changes on her breast exam  She states she had surgery in January for uterine fibroids  She also states she has lost 8 lbs  She denies persistent headaches, bone pain, back pain, shortness of breath, or abdominal pain  Review of Systems:  Review of Systems   Constitutional: Negative for activity change, appetite change, fatigue and unexpected weight change  Respiratory: Negative for cough and shortness of breath  Cardiovascular: Negative for chest pain  Gastrointestinal: Negative for abdominal pain, diarrhea, nausea and vomiting  Endocrine: Negative for heat intolerance  Musculoskeletal: Negative for arthralgias, back pain and myalgias  Skin: Negative for rash  Neurological: Negative for weakness and headaches  Hematological: Negative for adenopathy         Patient Active Problem List   Diagnosis   • Anxiety   • History of ductal carcinoma in situ (DCIS) of breast   • Class 2 obesity due to excess calories without serious comorbidity with body mass index (BMI) of 39 0 to 39 9 in adult   • Pure hypercholesterolemia   • Gastroesophageal reflux disease   • Encounter for follow-up examination after completed treatment for malignant neoplasm   • Hepatic steatosis   • Thrombocytosis   • Annual physical exam   • BMI 39 0-39 9,adult   • Abnormal uterine bleeding (AUB)     Past Medical History:   Diagnosis Date   • Abnormal Pap smear of cervix    • Anxiety 01/2015    Last assessed   • BRCA gene mutation negative 07/24/2020    [employer] 20 gene panel   • Breast cancer ([facility] [address] ) 08/28/2020    left breast DCIS   • Fatty liver    • HPV (human papilloma virus) infection     unsure     Past Surgical History:   Procedure Laterality Date   • BREAST BIOPSY Left 07/20/2020    DCIS   • BREAST CYST EXCISION Left 08/2020    DCIS   • BREAST LUMPECTOMY Left 8/28/2020    Procedure: BREAST NEEDLE LOCALIZED LUMPECTOMY (NEEDLE LOC AT 0900); Surgeon: Roe Wagner MD;  Location: [facility];  Service: Surgical Oncology   • CHOLECYSTECTOMY     • CHOLECYSTECTOMY LAPAROSCOPIC  01/2015    Last assessed   • COLPOSCOPY     • HYSTEROSCOPY WITH  RESECTION UTERINE TUMOR/FIBROID N/A 1/27/2023    Procedure: HYSTEROSCOPY WITH  RESECTION UTERINE TUMOR/FIBROID (SYMPHION);   Surgeon: Toñito Matt MD;  Location: [facility];  Service: Gynecology   • MAMMO NEEDLE LOCALIZATION LEFT (ALL INC) Left 8/28/2020   • MAMMO STEREOTACTIC BREAST BIOPSY LEFT (ALL INC) Left 7/20/2020   • MOUTH SURGERY     • OK HYSTEROSCOPY BX ENDOMETRIUM&/POLYPC W/WO D&C N/A 1/27/2023    Procedure: DILATATION AND CURETTAGE (D&C) WITH HYSTEROSCOPY;  Surgeon: Toñito Matt MD;  Location: [facility];  Service: Gynecology     Family History   Problem Relation Age of Onset   • Heart attack Father         Acute   • Heart failure Father    • Coronary artery disease Father    • Diabetes Father         DM   • Hypertension Father    • No Known Problems Maternal Grandmother    • Alzheimer's disease Maternal Grandfather    • Stroke Paternal Grandmother    • Prostate cancer Paternal Grandfather 76   • Cancer Paternal Grandfather    • No Known Problems Mother    • No Known Problems Son    • No Known Problems Sister    • No Known Problems Half-Sister    • No Known Problems Half-Sister    • No Known Problems Brother    • No Known Problems Maternal Uncle    • No Known Problems Maternal Uncle    • No Known Problems Maternal Aunt    • No Known Problems Maternal Aunt    • No Known Problems Maternal Aunt    • No Known Problems Paternal Uncle    • No Known Problems Paternal Uncle      Social History     Socioeconomic History   • Marital status: Single     Spouse name: Not on file   • Number of children: Not on file   • Years of education: Not on file   • Highest education level: Not on file   Occupational History   • Not on file   Tobacco Use   • Smoking status: Former     Packs/day: 1 00     Years: 25 00     Pack years: 25 00     Types: Cigarettes     Quit date: 2019     Years since quittin 1   • Smokeless tobacco: Never   Vaping Use   • Vaping Use: Former   • Substances: Nicotine   Substance and Sexual Activity   • Alcohol use: Not Currently     Alcohol/week: 0 0 standard drinks   • Drug use: Not Currently     Types: Marijuana   • Sexual activity: Not Currently     Partners: Male     Birth control/protection: Abstinence, None   Other Topics Concern   • Not on file   Social History Narrative    Caffeine use, Does not exercise, Full-time work, One child, Single,      Social Determinants of Health     Financial Resource Strain: Low Risk    • Difficulty of Paying Living Expenses: Not hard at all   Food Insecurity: No Food Insecurity   • Worried About [address] in the Last Year: Never true   • [address] [employer] in the Last Year: Never true   Transportation Needs: No Transportation Needs   • Lack of Transportation (Medical): No   • Lack of Transportation (Non-Medical): No   Physical Activity: Inactive   • Days of Exercise per Week: 0 days   • Minutes of Exercise per Session: 0 min   Stress: Stress Concern Present   • Feeling of Stress :  To some extent   Social Connections: Socially Isolated   • Frequency of Communication with Friends and Family: More than three times a week   • Frequency of Social Gatherings with Friends and Family: More than three times a week   • Attends L[employer] Services: Never   • Active Member of Clubs or Organizations: No   • Attends Club or Organization Meetings: Never   • Marital Status: Never    Intimate Partner Violence: Not At Risk   • Fear of Current or Ex-Partner: No   • Emotionally Abused: No   • Physically Abused: No   • Sexually Abused: No   Housing Stability: Low Risk    • Unable to Pay for Housing in the Last Year: No   • Number of Places Lived in the Last Year: 1   • Unstable Housing in the Last Year: No       Current Outpatient Medications:   •  atorvastatin (LIPITOR) 20 mg tablet, TAKE 1 TABLET BY MOUTH EVERY DAY, Disp: 90 tablet, Rfl: 3  •  Biotin 5000 MCG CAPS, Take by mouth, Disp: , Rfl:   •  cetirizine (ZyrTEC) 10 mg tablet, Take 10 mg by mouth daily, Disp: , Rfl:   •  DAILY MULTIPLE VITAMINS/IRON TABS, Take by mouth, Disp: , Rfl:   •  Omega-3 Fatty Acids (fish oil) 1,000 mg, Take 1,000 mg by mouth daily, Disp: , Rfl:   •  Probiotic Product (ACIDOPHILUS PROBIOTIC BLEND) CAPS, Take by mouth, Disp: , Rfl:   •  acetaminophen (TYLENOL) 325 mg tablet, Take 2 tablets (650 mg total) by mouth every 6 (six) hours as needed for mild pain, headaches or fever (Patient not taking: Reported on 5/22/2023), Disp: , Rfl: 0  •  ibuprofen (MOTRIN) 200 mg tablet, Take 3 tablets (600 mg total) by mouth every 6 (six) hours as needed for mild pain or moderate pain (Patient not taking: Reported on 5/22/2023), Disp: , Rfl:   •  Tranexamic Acid 650 MG TABS, TAKE 2 TABLETS (1,300 MG TOTAL) BY MOUTH 3 (THREE) TIMES A DAY FOR 5 DAYS (Patient not taking: Reported on 5/22/2023), Disp: 30 tablet, Rfl: 3  Allergies   Allergen Reactions   • Seasonal Ic [Cholestatin] Cough     Vitals:    05/22/23 1127   BP: 140/82   Pulse: 70   Resp: 15   Temp: 98 3 °F (36 8 °C)   SpO2: 99%       Physical Exam  Constitutional:       General: She is not in acute distress  Appearance: Normal appearance  Cardiovascular:      Rate and Rhythm: Normal rate and regular rhythm  Pulses: Normal pulses  Heart sounds: Normal heart sounds  Pulmonary:      Effort: Pulmonary effort is normal       Breath sounds: Normal breath sounds  Chest:      Chest wall: No mass  Breasts:     Right: No swelling, bleeding, inverted nipple, mass, nipple discharge, skin change or tenderness  Left: No swelling, bleeding, inverted nipple, mass, nipple discharge, skin change or tenderness  Abdominal:      General: Abdomen is flat  Palpations: Abdomen is soft  Lymphadenopathy:      Upper Body:      Right upper body: No supraclavicular, axillary or pectoral adenopathy  Left upper body: No supraclavicular, axillary or pectoral adenopathy  Skin:     General: Skin is warm  Neurological:      General: No focal deficit present  Mental Status: She is alert and oriented to person, place, and time  Psychiatric:         Mood and Affect: Mood normal          Behavior: Behavior normal            Results:    Imaging  No results found  I reviewed the above imaging data  Advance Care Planning/Advance Directives:  Discussed disease status, cancer treatment plans and/or cancer treatment goals with the patient

## 2023-07-31 ENCOUNTER — TRANSCRIBE ORDERS (OUTPATIENT)
Dept: MAMMOGRAPHY | Facility: CLINIC | Age: 43
End: 2023-07-31

## 2023-07-31 ENCOUNTER — HOSPITAL ENCOUNTER (OUTPATIENT)
Dept: MAMMOGRAPHY | Facility: CLINIC | Age: 43
Discharge: HOME | End: 2023-07-31
Payer: MEDICARE

## 2023-07-31 VITALS — BODY MASS INDEX: 37.83 KG/M2 | WEIGHT: 241 LBS | HEIGHT: 67 IN

## 2023-07-31 DIAGNOSIS — Z85.3 HISTORY OF BREAST CANCER: ICD-10-CM

## 2023-07-31 DIAGNOSIS — Z86.000 HISTORY OF DUCTAL CARCINOMA IN SITU (DCIS) OF BREAST: Primary | ICD-10-CM

## 2023-07-31 DIAGNOSIS — Z86.000 HISTORY OF DUCTAL CARCINOMA IN SITU (DCIS) OF BREAST: ICD-10-CM

## 2023-07-31 PROCEDURE — G0279 TOMOSYNTHESIS, MAMMO: HCPCS

## 2023-07-31 PROCEDURE — 77066 DX MAMMO INCL CAD BI: CPT

## 2023-08-18 ENCOUNTER — OFFICE VISIT (OUTPATIENT)
Dept: INTERNAL MEDICINE CLINIC | Facility: CLINIC | Age: 43
End: 2023-08-18

## 2023-08-18 VITALS
TEMPERATURE: 98.6 F | SYSTOLIC BLOOD PRESSURE: 145 MMHG | WEIGHT: 251 LBS | BODY MASS INDEX: 39.31 KG/M2 | DIASTOLIC BLOOD PRESSURE: 83 MMHG | OXYGEN SATURATION: 98 % | HEART RATE: 75 BPM

## 2023-08-18 DIAGNOSIS — H10.33 ACUTE BACTERIAL CONJUNCTIVITIS OF BOTH EYES: Primary | ICD-10-CM

## 2023-08-18 PROCEDURE — 99213 OFFICE O/P EST LOW 20 MIN: CPT | Performed by: PHYSICIAN ASSISTANT

## 2023-08-18 RX ORDER — CIPROFLOXACIN HYDROCHLORIDE 3.5 MG/ML
SOLUTION/ DROPS TOPICAL
Qty: 10 ML | Refills: 0 | Status: SHIPPED | OUTPATIENT
Start: 2023-08-18

## 2023-08-19 PROBLEM — H10.33 ACUTE BACTERIAL CONJUNCTIVITIS OF BOTH EYES: Status: ACTIVE | Noted: 2023-08-19

## 2023-08-19 NOTE — ASSESSMENT & PLAN NOTE
Possible bacterial conjunctivitis. Will cover with course of Cipro drops. Discussed eye care. Recommend sanitizing home. Return to care if symptoms worsen or fail to improve.
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
## 2023-08-21 ENCOUNTER — TELEPHONE (OUTPATIENT)
Dept: INTERNAL MEDICINE CLINIC | Facility: CLINIC | Age: 43
End: 2023-08-21

## 2023-08-21 NOTE — TELEPHONE ENCOUNTER
Patient called stating that the medication ciprofloxacin is not working. Per patient, she has been taking the drops since Friday and so far her eyes are still the same, there have been no changes. Patient would like a different medication.

## 2023-08-22 NOTE — TELEPHONE ENCOUNTER
If symptoms are still present once the course of eye drops are complete, she should come back in for further evaluation. If she has any sudden changes in vision, loss of vision, swelling around her eyes, pain with movement of her eyes, or significant headaches then she should go directly to the ED.

## 2023-08-24 ENCOUNTER — OFFICE VISIT (OUTPATIENT)
Dept: URGENT CARE | Age: 43
End: 2023-08-24
Payer: MEDICARE

## 2023-08-24 ENCOUNTER — TELEPHONE (OUTPATIENT)
Dept: PSYCHIATRY | Facility: CLINIC | Age: 43
End: 2023-08-24

## 2023-08-24 VITALS
BODY MASS INDEX: 38.92 KG/M2 | TEMPERATURE: 98.1 F | RESPIRATION RATE: 16 BRPM | HEART RATE: 76 BPM | WEIGHT: 248 LBS | HEIGHT: 67 IN

## 2023-08-24 DIAGNOSIS — F41.9 ANXIETY: Primary | ICD-10-CM

## 2023-08-24 DIAGNOSIS — H57.13 EYE PAIN, BILATERAL: ICD-10-CM

## 2023-08-24 PROCEDURE — 99213 OFFICE O/P EST LOW 20 MIN: CPT | Performed by: PHYSICIAN ASSISTANT

## 2023-08-24 RX ORDER — POLYVINYL ALCOHOL 14 MG/ML
1 SOLUTION/ DROPS OPHTHALMIC
Qty: 5 ML | Refills: 0 | Status: SHIPPED | OUTPATIENT
Start: 2023-08-24

## 2023-08-24 RX ORDER — KETOTIFEN FUMARATE 0.35 MG/ML
1 SOLUTION/ DROPS OPHTHALMIC 2 TIMES DAILY
Qty: 5 ML | Refills: 0 | Status: SHIPPED | OUTPATIENT
Start: 2023-08-24

## 2023-08-24 RX ORDER — AMOXICILLIN AND CLAVULANATE POTASSIUM 875; 125 MG/1; MG/1
1 TABLET, FILM COATED ORAL EVERY 12 HOURS SCHEDULED
Qty: 14 TABLET | Refills: 0 | Status: SHIPPED | OUTPATIENT
Start: 2023-08-24 | End: 2023-08-31

## 2023-08-24 RX ORDER — HYDROXYZINE PAMOATE 25 MG/1
25 CAPSULE ORAL 3 TIMES DAILY PRN
Qty: 30 CAPSULE | Refills: 0 | Status: SHIPPED | OUTPATIENT
Start: 2023-08-24

## 2023-08-24 NOTE — TELEPHONE ENCOUNTER
Pt had reached back out regarding referral. Writer informed pt at this time we do not have any availability at [facility] or [facility] at this time but will be placing them on asap wait list with pref. Pt verbalized understanding.  Closing referral.

## 2023-08-24 NOTE — PROGRESS NOTES
[facility]        NAME: Umberto Spencer is a 37 y.o. female  : 1980    MRN: [id number]  DATE: 2023  TIME: 10:18 AM    Assessment and Plan   Anxiety [F41.9]  1. Anxiety  hydrOXYzine pamoate (VISTARIL) 25 mg capsule    Ambulatory Referral to Psychiatry      2. Eye pain, bilateral  ketotifen (ZADITOR) 0.025 % ophthalmic solution    amoxicillin-clavulanate (AUGMENTIN) 875-125 mg per tablet    polyvinyl alcohol (LIQUIFILM TEARS) 1.4 % ophthalmic solution    Ambulatory Referral to Ophthalmology        Eyes were stained with fluorescein strip. No uptake or dendritic lesions were seen with examination under Woods lamp. Anxiety also addressed today. Recommend pharm treatment. Will refer to psych if PCP is unable to rx an SSRI. No SI or HI. Anxiety is situational and has been going on for about 10 years. Patient Instructions   Patient Instructions     Take the oral antibotic. Apply the allergy drops to the eye 2x a day and use the lubricating drops at night before bed. Sleep with a humidifier by the bed. Shut the door an hour before bed and let the humidity build up. Referral to an eye doctor placed. Atarax as needed for anxiety until you can get on a daily medication. Referral for psych placed. Anxiety   WHAT YOU NEED TO KNOW:   Anxiety is a condition that causes you to feel extremely worried or nervous. The feelings are so strong that they can cause problems with your daily activities or sleep. Anxiety may be triggered by something you fear, or it may happen without a cause. Family or work stress, smoking, caffeine, and alcohol can increase your risk for anxiety. Certain medicines or health conditions can also increase your risk. Anxiety can become a long-term condition if it is not managed or treated.   DISCHARGE INSTRUCTIONS:   Call your local emergency number (910 in the [address]) if:   • You have chest pain, tightness, or heaviness that may spread to your shoulders, arms, jaw, neck, or back. • You feel like hurting yourself or someone else. Call your doctor if:   • Your symptoms get worse or do not get better with treatment. • Your anxiety keeps you from doing your regular daily activities. • You have new symptoms since your last visit. • You have questions or concerns about your condition or care. Medicines:   • Medicines  may be given to help you feel more calm and relaxed, and decrease your symptoms. • Take your medicine as directed. Contact your healthcare provider if you think your medicine is not helping or if you have side effects. Tell your provider if you are allergic to any medicine. Keep a list of the medicines, vitamins, and herbs you take. Include the amounts, and when and why you take them. Bring the list or the pill bottles to follow-up visits. Carry your medicine list with you in case of an emergency. Manage anxiety:   • Talk to someone about your anxiety. Your healthcare provider may suggest counseling. Cognitive behavioral therapy can help you understand and change how you react to events that trigger your symptoms. You might feel more comfortable talking with a friend or family member about your anxiety. Choose someone you know will be supportive and encouraging. • Find ways to relax. Activities such as exercise, meditation, or listening to music can help you relax. Spend time with friends, or do things you enjoy. • Practice deep breathing. Deep breathing can help you relax when you feel anxious. Focus on taking slow, deep breaths several times a day, or during an anxiety attack. Breathe in through your nose and out through your mouth. • Create a regular sleep routine. Regular sleep can help you feel calmer during the day. Go to sleep and wake up at the same times every day. Do not watch television or use the computer right before bed. Your room should be comfortable, dark, and quiet. • Eat a variety of healthy foods.   Healthy foods include fruits, vegetables, low-fat dairy products, lean meats, fish, whole-grain breads, and cooked beans. Healthy foods can help you feel less anxious and have more energy. • Exercise regularly. Exercise can increase your energy level. Exercise may also lift your mood and help you sleep better. Your healthcare provider can help you create an exercise plan. • Do not smoke. Nicotine and other chemicals in cigarettes and cigars can increase anxiety. Ask your healthcare provider for information if you currently smoke and need help to quit. E-cigarettes or smokeless tobacco still contain nicotine. Talk to your healthcare provider before you use these products. • Do not have caffeine. Caffeine can make your symptoms worse. Do not have foods or drinks that are meant to increase your energy level. • Limit or do not drink alcohol. Ask your healthcare provider if alcohol is safe for you. You may not be able to drink alcohol if you take certain anxiety or depression medicines. Limit alcohol to 1 drink per day if you are a woman. Limit alcohol to 2 drinks per day if you are a man. A drink of alcohol is 12 ounces of beer, 5 ounces of wine, or 1½ ounces of liquor. • Do not use drugs. Drugs can make your anxiety worse. It can also make anxiety hard to manage. Talk to your healthcare provider if you use drugs and want help to quit. Follow up with your doctor within 2 weeks or as directed:  Write down your questions so you remember to ask them during your visits. © Copyright [employer] 2022 Information is for End User's use only and may not be sold, redistributed or otherwise used for commercial purposes. The above information is an  only. It is not intended as medical advice for individual conditions or treatments. Talk to your doctor, nurse or pharmacist before following any medical regimen to see if it is safe and effective for you.           Follow up with PCP in 3-5 days.  Proceed to  ER if symptoms worsen. Chief Complaint     Chief Complaint   Patient presents with   • Eye Pain     Since the end of May patient has been having eye irritation. States they are red, crusty, and teary. She has been prescribed eye drops as well as using OTC eyedrops. States she was exposed to mold at work and three days later her symptoms started. History of Present Illness       The pt is a 76-year-old female presenting today for a follow up visit for continued eye pain, discharge, tearing and injection x 3 months. She reports that 3 months ago she was cleaning out a cubby at work that smelled like mold. Immediately that night she noticed pain in her eye, erythema and tearing. She was evaluated by her eye doctor about 2 weeks later who found nothing on exam.  He thought it may just be allergies and recommended she get allergy eye drops. 7 days ago she saw her PCP for the same issue and was given a course of Cipro eyedrops. She states they did not help at all. Reports waking up every morning with her eyes crusted shut and having tearing throughout the day. She reports a burning pain, itching, tearing and green crusty discharge. She reports trying to allergy eyedrops over-the-counter, taking Zyrtec every day and finishing a course of Cipro drops. She also reports  situational anxiety that has been going on for the last 10 or more years. She reports ever since being diagnosed with breast cancer having high blood pressure when she comes into the office. At home her blood pressure is normal.  Reports feeling anxiety when going out of her house to places. She believes she needs to be on medication and that she has a chemical imbalance but that her PCP cannot prescribe anything.   She was told that because of her insurance she needs a follow-up with a specialist.       Review of Systems   Review of Systems   Constitutional: Negative for activity change, appetite change, chills, fatigue and fever. HENT: Negative for congestion, ear pain, rhinorrhea, sinus pressure, sinus pain and sore throat. Eyes: Positive for pain, discharge, redness and itching. Negative for photophobia and visual disturbance. Respiratory: Negative for cough, chest tightness and shortness of breath. Cardiovascular: Negative for chest pain and palpitations. Gastrointestinal: Negative for abdominal pain, diarrhea, nausea and vomiting. Genitourinary: Negative for dysuria and hematuria. Musculoskeletal: Negative for arthralgias, back pain and myalgias. Skin: Negative for color change, pallor and rash. Neurological: Negative for seizures, syncope and headaches. Psychiatric/Behavioral: The patient is nervous/anxious. All other systems reviewed and are negative.         Current Medications       Current Outpatient Medications:   •  amoxicillin-clavulanate (AUGMENTIN) 875-125 mg per tablet, Take 1 tablet by mouth every 12 (twelve) hours for 7 days, Disp: 14 tablet, Rfl: 0  •  atorvastatin (LIPITOR) 20 mg tablet, TAKE 1 TABLET BY MOUTH EVERY DAY, Disp: 90 tablet, Rfl: 3  •  Biotin 5000 MCG CAPS, Take by mouth, Disp: , Rfl:   •  cetirizine (ZyrTEC) 10 mg tablet, Take 10 mg by mouth daily, Disp: , Rfl:   •  ciprofloxacin (CILOXAN) 0.3 % ophthalmic solution, 1-2 drops each eye every 2 hours while awake for 2 days then every 4 hours for 5 days, Disp: 10 mL, Rfl: 0  •  DAILY MULTIPLE VITAMINS/IRON TABS, Take by mouth, Disp: , Rfl:   •  hydrOXYzine pamoate (VISTARIL) 25 mg capsule, Take 1 capsule (25 mg total) by mouth 3 (three) times a day as needed for anxiety, Disp: 30 capsule, Rfl: 0  •  ketotifen (ZADITOR) 0.025 % ophthalmic solution, Administer 1 drop to both eyes 2 (two) times a day, Disp: 5 mL, Rfl: 0  •  Omega-3 Fatty Acids (fish oil) 1,000 mg, Take 1,000 mg by mouth daily, Disp: , Rfl:   •  polyvinyl alcohol (LIQUIFILM TEARS) 1.4 % ophthalmic solution, Administer 1 drop to both eyes daily at bedtime, Disp: 5 mL, Rfl: 0  •  Probiotic Product (ACIDOPHILUS PROBIOTIC BLEND) CAPS, Take by mouth, Disp: , Rfl:     Current Allergies     Allergies as of 08/24/2023   • (No Known Allergies)            The following portions of the patient's history were reviewed and updated as appropriate: allergies, current medications, past family history, past medical history, past social history, past surgical history and problem list.     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   • Fatty liver    • HPV (human papilloma virus) infection     unsure       Past Surgical History:   Procedure Laterality Date   • BREAST BIOPSY Left 07/20/2020    DCIS   • BREAST CYST EXCISION Left 08/2020    DCIS   • BREAST LUMPECTOMY Left 08/28/2020    Procedure: BREAST NEEDLE LOCALIZED LUMPECTOMY (NEEDLE LOC AT 0900); Surgeon: Lizzie Sanchez MD;  Location: [facility];  Service: Surgical Oncology   • CHOLECYSTECTOMY     • CHOLECYSTECTOMY LAPAROSCOPIC  01/2015    Last assessed   • COLPOSCOPY     • HYSTEROSCOPY WITH  RESECTION UTERINE TUMOR/FIBROID N/A 01/27/2023    Procedure: HYSTEROSCOPY WITH  RESECTION UTERINE TUMOR/FIBROID (SYMPHION);   Surgeon: Frankie Mcknight MD;  Location: [facility];  Service: Gynecology   • MAMMO NEEDLE LOCALIZATION LEFT (ALL INC) Left 08/28/2020   • MAMMO STEREOTACTIC BREAST BIOPSY LEFT (ALL INC) Left 07/20/2020   • MOUTH SURGERY     • GA HYSTEROSCOPY BX ENDOMETRIUM&/POLYPC W/WO D&C N/A 01/27/2023    Procedure: DILATATION AND CURETTAGE (D&C) WITH HYSTEROSCOPY;  Surgeon: Frankie Mcknight MD;  Location: [facility];  Service: Gynecology       Family History   Problem Relation Age of Onset   • Heart attack Father         Acute   • Heart failure Father    • Coronary artery disease Father    • Diabetes Father         DM   • Hypertension Father    • No Known Problems Maternal Grandmother    • Alzheimer's disease Maternal Grandfather    • Stroke Paternal Grandmother    • Prostate cancer Paternal Grandfather 76   • Cancer Paternal Grandfather    • No Known Problems Mother    • No Known Problems Son    • No Known Problems Sister    • No Known Problems Half-Sister    • No Known Problems Half-Sister    • No Known Problems Brother    • No Known Problems Maternal Uncle    • No Known Problems Maternal Uncle    • No Known Problems Maternal Aunt    • No Known Problems Maternal Aunt    • No Known Problems Maternal Aunt    • No Known Problems Paternal Uncle    • No Known Problems Paternal Uncle          Medications have been verified. Objective   Pulse 76   Temp 98.1 °F (36.7 °C)   Resp 16   Ht 5' 7" (1.702 m)   Wt 112 kg (248 lb)   LMP 07/29/2023   BMI 38.84 kg/m²        Physical Exam     Physical Exam  Vitals and nursing note reviewed. Constitutional:       Appearance: Normal appearance. She is normal weight. Eyes:      General: Lids are normal.         Right eye: Discharge (tearing) present. No foreign body. Left eye: Discharge (tearing) present. No foreign body. Extraocular Movements:      Right eye: Normal extraocular motion and no nystagmus. Left eye: Normal extraocular motion and no nystagmus. Conjunctiva/sclera:      Right eye: Right conjunctiva is injected. No chemosis or exudate. Left eye: Left conjunctiva is injected. No chemosis or exudate. Pupils: Pupils are equal, round, and reactive to light. Comments: Eyes stained with fluorescence and examined under woods lamp. No uptake seen. No dendritic lesions seen. Cardiovascular:      Rate and Rhythm: Normal rate. Pulmonary:      Effort: Pulmonary effort is normal.   Musculoskeletal:         General: Normal range of motion. Skin:     General: Skin is warm. Capillary Refill: Capillary refill takes less than 2 seconds.    Neurological:      Mental Status: She is alert.

## 2023-08-24 NOTE — PATIENT INSTRUCTIONS
Take the oral antibotic. Apply the allergy drops to the eye 2x a day and use the lubricating drops at night before bed. Sleep with a humidifier by the bed. Shut the door an hour before bed and let the humidity build up. Referral to an eye doctor placed. Atarax as needed for anxiety until you can get on a daily medication. Referral for psych placed. Anxiety   WHAT YOU NEED TO KNOW:   Anxiety is a condition that causes you to feel extremely worried or nervous. The feelings are so strong that they can cause problems with your daily activities or sleep. Anxiety may be triggered by something you fear, or it may happen without a cause. Family or work stress, smoking, caffeine, and alcohol can increase your risk for anxiety. Certain medicines or health conditions can also increase your risk. Anxiety can become a long-term condition if it is not managed or treated. DISCHARGE INSTRUCTIONS:   Call your local emergency number (911 in the [address]) if:   You have chest pain, tightness, or heaviness that may spread to your shoulders, arms, jaw, neck, or back. You feel like hurting yourself or someone else. Call your doctor if:   Your symptoms get worse or do not get better with treatment. Your anxiety keeps you from doing your regular daily activities. You have new symptoms since your last visit. You have questions or concerns about your condition or care. Medicines:   Medicines  may be given to help you feel more calm and relaxed, and decrease your symptoms. Take your medicine as directed. Contact your healthcare provider if you think your medicine is not helping or if you have side effects. Tell your provider if you are allergic to any medicine. Keep a list of the medicines, vitamins, and herbs you take. Include the amounts, and when and why you take them. Bring the list or the pill bottles to follow-up visits. Carry your medicine list with you in case of an emergency.     Manage anxiety:   Talk to someone about your anxiety. Your healthcare provider may suggest counseling. Cognitive behavioral therapy can help you understand and change how you react to events that trigger your symptoms. You might feel more comfortable talking with a friend or family member about your anxiety. Choose someone you know will be supportive and encouraging. Find ways to relax. Activities such as exercise, meditation, or listening to music can help you relax. Spend time with friends, or do things you enjoy. Practice deep breathing. Deep breathing can help you relax when you feel anxious. Focus on taking slow, deep breaths several times a day, or during an anxiety attack. Breathe in through your nose and out through your mouth. Create a regular sleep routine. Regular sleep can help you feel calmer during the day. Go to sleep and wake up at the same times every day. Do not watch television or use the computer right before bed. Your room should be comfortable, dark, and quiet. Eat a variety of healthy foods. Healthy foods include fruits, vegetables, low-fat dairy products, lean meats, fish, whole-grain breads, and cooked beans. Healthy foods can help you feel less anxious and have more energy. Exercise regularly. Exercise can increase your energy level. Exercise may also lift your mood and help you sleep better. Your healthcare provider can help you create an exercise plan. Do not smoke. Nicotine and other chemicals in cigarettes and cigars can increase anxiety. Ask your healthcare provider for information if you currently smoke and need help to quit. E-cigarettes or smokeless tobacco still contain nicotine. Talk to your healthcare provider before you use these products. Do not have caffeine. Caffeine can make your symptoms worse. Do not have foods or drinks that are meant to increase your energy level. Limit or do not drink alcohol. Ask your healthcare provider if alcohol is safe for you. You may not be able to drink alcohol if you take certain anxiety or depression medicines. Limit alcohol to 1 drink per day if you are a woman. Limit alcohol to 2 drinks per day if you are a man. A drink of alcohol is 12 ounces of beer, 5 ounces of wine, or 1½ ounces of liquor. Do not use drugs. Drugs can make your anxiety worse. It can also make anxiety hard to manage. Talk to your healthcare provider if you use drugs and want help to quit. Follow up with your doctor within 2 weeks or as directed:  Write down your questions so you remember to ask them during your visits. © Copyright [employer] 2022 Information is for End User's use only and may not be sold, redistributed or otherwise used for commercial purposes. The above information is an  only. It is not intended as medical advice for individual conditions or treatments. Talk to your doctor, nurse or pharmacist before following any medical regimen to see if it is safe and effective for you.

## 2023-08-24 NOTE — TELEPHONE ENCOUNTER
Patient contacted the office seeking to speak with the . Writer transferred call to the  for assistance.

## 2023-10-10 ENCOUNTER — TELEPHONE (OUTPATIENT)
Dept: INTERNAL MEDICINE CLINIC | Facility: CLINIC | Age: 43
End: 2023-10-10

## 2023-10-10 NOTE — TELEPHONE ENCOUNTER
Patient called requesting labs - patient is scheduled with April 10/18/23 for the annual physical    Please check into this and if labs are ordered, please call the patient to advise.

## 2023-10-16 DIAGNOSIS — Z13.0 SCREENING FOR DEFICIENCY ANEMIA: ICD-10-CM

## 2023-10-16 DIAGNOSIS — Z13.1 SCREENING FOR DIABETES MELLITUS: ICD-10-CM

## 2023-10-16 DIAGNOSIS — Z13.29 SCREENING FOR THYROID DISORDER: ICD-10-CM

## 2023-10-16 DIAGNOSIS — E78.00 PURE HYPERCHOLESTEROLEMIA: Primary | ICD-10-CM

## 2023-10-18 PROBLEM — H10.33 ACUTE BACTERIAL CONJUNCTIVITIS OF BOTH EYES: Status: RESOLVED | Noted: 2023-08-19 | Resolved: 2023-10-18

## 2024-01-17 ENCOUNTER — TELEPHONE (OUTPATIENT)
Dept: HEMATOLOGY ONCOLOGY | Facility: CLINIC | Age: 44
End: 2024-01-17

## 2024-01-17 NOTE — TELEPHONE ENCOUNTER
Patient Call    Who are you speaking with? Patient    If it is not the patient, are they listed on an active communication consent form? N/A   What is the reason for this call? She called to confirm [employer] ppo. Per our list we do   Does this require a call back? N/A   If a call back is required, please list best call back number N/a   If a call back is required, advise that a message will be forwarded to their care team and someone will return their call as soon as possible.   Did you relay this information to the patient? N/A

## 2024-02-21 DIAGNOSIS — E78.00 PURE HYPERCHOLESTEROLEMIA: Chronic | ICD-10-CM

## 2024-02-21 RX ORDER — ATORVASTATIN CALCIUM 20 MG/1
TABLET, FILM COATED ORAL
Qty: 90 TABLET | Refills: 3 | Status: SHIPPED | OUTPATIENT
Start: 2024-02-21

## 2024-03-19 ENCOUNTER — OFFICE VISIT (OUTPATIENT)
Dept: INTERNAL MEDICINE CLINIC | Facility: CLINIC | Age: 44
End: 2024-03-19

## 2024-03-19 VITALS
BODY MASS INDEX: 38.22 KG/M2 | WEIGHT: 244 LBS | TEMPERATURE: 97.5 F | HEART RATE: 85 BPM | OXYGEN SATURATION: 98 % | SYSTOLIC BLOOD PRESSURE: 172 MMHG | DIASTOLIC BLOOD PRESSURE: 113 MMHG

## 2024-03-19 DIAGNOSIS — U07.1 COVID: Primary | ICD-10-CM

## 2024-03-19 PROBLEM — Z00.00 ANNUAL PHYSICAL EXAM: Status: RESOLVED | Noted: 2022-10-11 | Resolved: 2024-03-19

## 2024-03-19 LAB
SARS-COV-2 AG UPPER RESP QL IA: POSITIVE
VALID CONTROL: ABNORMAL

## 2024-03-19 PROCEDURE — 99214 OFFICE O/P EST MOD 30 MIN: CPT | Performed by: PHYSICIAN ASSISTANT

## 2024-03-19 PROCEDURE — 87811 SARS-COV-2 COVID19 W/OPTIC: CPT | Performed by: PHYSICIAN ASSISTANT

## 2024-03-19 RX ORDER — FLUTICASONE PROPIONATE 50 MCG
2 SPRAY, SUSPENSION (ML) NASAL DAILY
Qty: 16 G | Refills: 2 | Status: SHIPPED | OUTPATIENT
Start: 2024-03-19

## 2024-03-19 NOTE — LETTER
March 19, 2024     Patient: Desirae Coles  YOB: 1980  Date of Visit: 3/19/2024      To Whom it May Concern:    Desirae Coles is under my professional care. Desirae was seen in my office on 3/19/2024. Desirae may return to work on 3/25/24 .    If you have any questions or concerns, please don't hesitate to call.         Sincerely,          Na Sullivan PA-C        CC: No Recipients

## 2024-03-20 PROBLEM — U07.1 COVID: Status: ACTIVE | Noted: 2024-03-20

## 2024-03-20 NOTE — PROGRESS NOTES
Name: Desirae Coles      : 1980      MRN: [id number]  Encounter Provider: Na Sullivan PA-C  Encounter Date: 3/19/2024   Encounter department: [facility]    Assessment & Plan     1. COVID  Assessment & Plan:  Positive COVID tests at home and positive rapid antigen today in the office. Recommend supportive treatment. Stop Afrin, discussed this is not for long term use. Discussed rhinitis medicamentosa. Start Flonase. Stop Sudafed. Likely cause of elevated BP today. Refused BP recheck at the end of the visit. Patient also states her anxiety has worsened. Recommend cold/flu medications without pseudoephedrine in it. Tylenol or ibuprofen as needed. Ensure adequate hydration and rest. ER precautions given. Return to care if symptoms worsen or fail to improve.     Orders:  -     POCT Rapid Covid Ag  -     fluticasone (FLONASE) 50 mcg/act nasal spray; 2 sprays into each nostril daily           Subjective      Patient is a 43-year-old female presenting for same-day appointment.  States she has not felt well for the past 4 days, started Saturday.  States Saturday and  were not that severe, just felt unwell, fatigued and had body aches and headache.  States yesterday and today have been more severe.  Denies fever or chills.  Admits to headache.  Admits to decreased taste and smell.  She admits to fatigue and bodyaches.  Denies feeling lightheaded or dizzy.  Admits to congestion and having sinus pain.  Denies sore throat.  Occasional cough.  Denies wheezing or shortness of breath.  She took two home COVID tests that were positive. Admits to ill contacts at work.  She has been taking Sudafed which helps.  She also uses Afrin, but she uses this daily for her allergies.  She knows she is not supposed to use it but has difficulty stopping.  Denies recent travel.      Review of Systems   Constitutional:  Positive for appetite change and fatigue. Negative for chills,  diaphoresis, fever and unexpected weight change.   HENT:  Positive for congestion, sinus pressure and sinus pain. Negative for ear discharge, ear pain, postnasal drip, rhinorrhea, sneezing, sore throat and trouble swallowing.    Eyes:  Negative for visual disturbance.   Respiratory:  Positive for cough. Negative for chest tightness, shortness of breath and wheezing.    Cardiovascular:  Negative for chest pain, palpitations and leg swelling.   Gastrointestinal:  Negative for abdominal pain, diarrhea, nausea and vomiting.   Musculoskeletal:  Positive for arthralgias and myalgias.   Skin:  Negative for rash.   Neurological:  Positive for headaches. Negative for dizziness, weakness and light-headedness.   Hematological:  Negative for adenopathy.       Current Outpatient Medications on File Prior to Visit   Medication Sig    atorvastatin (LIPITOR) 20 mg tablet TAKE 1 TABLET BY MOUTH EVERY DAY    Biotin 5000 MCG CAPS Take by mouth    cetirizine (ZyrTEC) 10 mg tablet Take 10 mg by mouth daily    DAILY MULTIPLE VITAMINS/IRON TABS Take by mouth    hydrOXYzine pamoate (VISTARIL) 25 mg capsule Take 1 capsule (25 mg total) by mouth 3 (three) times a day as needed for anxiety    Omega-3 Fatty Acids (fish oil) 1,000 mg Take 1,000 mg by mouth daily    Probiotic Product (ACIDOPHILUS PROBIOTIC BLEND) CAPS Take by mouth       Objective     BP (!) 172/113 (BP Location: Right arm, Patient Position: Sitting, Cuff Size: Large)   Pulse 85   Temp 97.5 °F (36.4 °C) (Temporal)   Wt 111 kg (244 lb)   SpO2 98%   BMI 38.22 kg/m²     Physical Exam  Vitals and nursing note reviewed.   Constitutional:       General: She is awake. She is not in acute distress.     Appearance: Normal appearance. She is well-developed and well-groomed. She is obese. She is not ill-appearing.   HENT:      Head: Normocephalic and atraumatic.      Right Ear: Tympanic membrane, ear canal and external ear normal.      Left Ear: Tympanic membrane, ear canal and  external ear normal.      Nose: Congestion present. No rhinorrhea.      Right Sinus: No maxillary sinus tenderness or frontal sinus tenderness.      Left Sinus: No maxillary sinus tenderness or frontal sinus tenderness.      Mouth/Throat:      Lips: Pink.      Mouth: Mucous membranes are moist.      Pharynx: Oropharynx is clear. Uvula midline. No oropharyngeal exudate or posterior oropharyngeal erythema.      Tonsils: No tonsillar exudate or tonsillar abscesses.   Eyes:      General: No scleral icterus.     Conjunctiva/sclera: Conjunctivae normal.   Cardiovascular:      Rate and Rhythm: Normal rate and regular rhythm.      Pulses: Normal pulses.           Radial pulses are 2+ on the right side and 2+ on the left side.      Heart sounds: Normal heart sounds. No murmur heard.  Pulmonary:      Effort: Pulmonary effort is normal. No respiratory distress.      Breath sounds: Normal breath sounds and air entry. No decreased air movement. No decreased breath sounds, wheezing, rhonchi or rales.   Abdominal:      General: Abdomen is flat. Bowel sounds are normal. There is no distension.      Palpations: Abdomen is soft. There is no mass.      Tenderness: There is no abdominal tenderness. There is no right CVA tenderness, left CVA tenderness, guarding or rebound.      Hernia: No hernia is present.   Musculoskeletal:      Cervical back: Neck supple.   Lymphadenopathy:      Cervical: No cervical adenopathy.   Skin:     General: Skin is warm.      Coloration: Skin is not jaundiced.      Findings: No rash.   Neurological:      General: No focal deficit present.      Mental Status: She is alert and oriented to person, place, and time. Mental status is at baseline.      Motor: Motor function is intact.      Coordination: Coordination is intact.      Gait: Gait is intact.   Psychiatric:         Attention and Perception: Attention normal.         Mood and Affect: Mood and affect normal.         Speech: Speech normal.          Behavior: Behavior normal. Behavior is cooperative.       Na Sullivan PA-C

## 2024-03-20 NOTE — ASSESSMENT & PLAN NOTE
Positive COVID tests at home and positive rapid antigen today in the office. Recommend supportive treatment. Stop Afrin, discussed this is not for long term use. Discussed rhinitis medicamentosa. Start Flonase. Stop Sudafed. Likely cause of elevated BP today. Refused BP recheck at the end of the visit. Patient also states her anxiety has worsened. Recommend cold/flu medications without pseudoephedrine in it. Tylenol or ibuprofen as needed. Ensure adequate hydration and rest. ER precautions given. Return to care if symptoms worsen or fail to improve.

## 2024-04-20 ENCOUNTER — APPOINTMENT (OUTPATIENT)
Dept: LAB | Facility: CLINIC | Age: 44
End: 2024-04-20
Payer: COMMERCIAL

## 2024-04-20 DIAGNOSIS — E78.00 PURE HYPERCHOLESTEROLEMIA: ICD-10-CM

## 2024-04-20 DIAGNOSIS — Z13.29 SCREENING FOR THYROID DISORDER: ICD-10-CM

## 2024-04-20 DIAGNOSIS — Z13.0 SCREENING FOR DEFICIENCY ANEMIA: ICD-10-CM

## 2024-04-20 DIAGNOSIS — Z13.1 SCREENING FOR DIABETES MELLITUS: ICD-10-CM

## 2024-04-20 LAB
ALBUMIN SERPL BCP-MCNC: 4.6 G/DL (ref 3.5–5)
ALP SERPL-CCNC: 129 U/L (ref 34–104)
ALT SERPL W P-5'-P-CCNC: 63 U/L (ref 7–52)
ANION GAP SERPL CALCULATED.3IONS-SCNC: 8 MMOL/L (ref 4–13)
AST SERPL W P-5'-P-CCNC: 64 U/L (ref 13–39)
BASOPHILS # BLD AUTO: 0.06 THOUSANDS/ÂΜL (ref 0–0.1)
BASOPHILS NFR BLD AUTO: 1 % (ref 0–1)
BILIRUB SERPL-MCNC: 0.67 MG/DL (ref 0.2–1)
BUN SERPL-MCNC: 12 MG/DL (ref 5–25)
CALCIUM SERPL-MCNC: 9.7 MG/DL (ref 8.4–10.2)
CHLORIDE SERPL-SCNC: 102 MMOL/L (ref 96–108)
CHOLEST SERPL-MCNC: 170 MG/DL
CO2 SERPL-SCNC: 28 MMOL/L (ref 21–32)
CREAT SERPL-MCNC: 0.65 MG/DL (ref 0.6–1.3)
EOSINOPHIL # BLD AUTO: 0.33 THOUSAND/ÂΜL (ref 0–0.61)
EOSINOPHIL NFR BLD AUTO: 4 % (ref 0–6)
ERYTHROCYTE [DISTWIDTH] IN BLOOD BY AUTOMATED COUNT: 13.8 % (ref 11.6–15.1)
EST. AVERAGE GLUCOSE BLD GHB EST-MCNC: 117 MG/DL
GFR SERPL CREATININE-BSD FRML MDRD: 106 ML/MIN/1.73SQ M
GLUCOSE P FAST SERPL-MCNC: 98 MG/DL (ref 65–99)
HBA1C MFR BLD: 5.7 %
HCT VFR BLD AUTO: 40.7 % (ref 34.8–46.1)
HDLC SERPL-MCNC: 71 MG/DL
HGB BLD-MCNC: 13.1 G/DL (ref 11.5–15.4)
IMM GRANULOCYTES # BLD AUTO: 0.02 THOUSAND/UL (ref 0–0.2)
IMM GRANULOCYTES NFR BLD AUTO: 0 % (ref 0–2)
LDLC SERPL CALC-MCNC: 83 MG/DL (ref 0–100)
LYMPHOCYTES # BLD AUTO: 2.7 THOUSANDS/ÂΜL (ref 0.6–4.47)
LYMPHOCYTES NFR BLD AUTO: 32 % (ref 14–44)
MCH RBC QN AUTO: 30.4 PG (ref 26.8–34.3)
MCHC RBC AUTO-ENTMCNC: 32.2 G/DL (ref 31.4–37.4)
MCV RBC AUTO: 94 FL (ref 82–98)
MONOCYTES # BLD AUTO: 0.51 THOUSAND/ÂΜL (ref 0.17–1.22)
MONOCYTES NFR BLD AUTO: 6 % (ref 4–12)
NEUTROPHILS # BLD AUTO: 4.76 THOUSANDS/ÂΜL (ref 1.85–7.62)
NEUTS SEG NFR BLD AUTO: 57 % (ref 43–75)
NONHDLC SERPL-MCNC: 99 MG/DL
NRBC BLD AUTO-RTO: 0 /100 WBCS
PLATELET # BLD AUTO: 327 THOUSANDS/UL (ref 149–390)
PMV BLD AUTO: 9.9 FL (ref 8.9–12.7)
POTASSIUM SERPL-SCNC: 4.6 MMOL/L (ref 3.5–5.3)
PROT SERPL-MCNC: 8 G/DL (ref 6.4–8.4)
RBC # BLD AUTO: 4.31 MILLION/UL (ref 3.81–5.12)
SODIUM SERPL-SCNC: 138 MMOL/L (ref 135–147)
TRIGL SERPL-MCNC: 78 MG/DL
TSH SERPL DL<=0.05 MIU/L-ACNC: 2.54 UIU/ML (ref 0.45–4.5)
WBC # BLD AUTO: 8.38 THOUSAND/UL (ref 4.31–10.16)

## 2024-04-20 PROCEDURE — 83036 HEMOGLOBIN GLYCOSYLATED A1C: CPT

## 2024-04-20 PROCEDURE — 36415 COLL VENOUS BLD VENIPUNCTURE: CPT

## 2024-04-20 PROCEDURE — 85025 COMPLETE CBC W/AUTO DIFF WBC: CPT

## 2024-04-20 PROCEDURE — 80061 LIPID PANEL: CPT

## 2024-04-20 PROCEDURE — 84443 ASSAY THYROID STIM HORMONE: CPT

## 2024-04-20 PROCEDURE — 80053 COMPREHEN METABOLIC PANEL: CPT

## 2024-04-23 ENCOUNTER — OFFICE VISIT (OUTPATIENT)
Dept: INTERNAL MEDICINE CLINIC | Facility: CLINIC | Age: 44
End: 2024-04-23

## 2024-04-23 VITALS
DIASTOLIC BLOOD PRESSURE: 74 MMHG | BODY MASS INDEX: 39.05 KG/M2 | TEMPERATURE: 98.6 F | OXYGEN SATURATION: 100 % | HEIGHT: 67 IN | SYSTOLIC BLOOD PRESSURE: 158 MMHG | HEART RATE: 80 BPM | WEIGHT: 248.8 LBS

## 2024-04-23 DIAGNOSIS — Z12.12 ENCOUNTER FOR COLORECTAL CANCER SCREENING: ICD-10-CM

## 2024-04-23 DIAGNOSIS — K76.0 HEPATIC STEATOSIS: ICD-10-CM

## 2024-04-23 DIAGNOSIS — Z00.00 ANNUAL PHYSICAL EXAM: Primary | ICD-10-CM

## 2024-04-23 DIAGNOSIS — F41.1 GAD (GENERALIZED ANXIETY DISORDER): ICD-10-CM

## 2024-04-23 DIAGNOSIS — E78.00 PURE HYPERCHOLESTEROLEMIA: Chronic | ICD-10-CM

## 2024-04-23 DIAGNOSIS — J30.89 SEASONAL ALLERGIC RHINITIS DUE TO OTHER ALLERGIC TRIGGER: ICD-10-CM

## 2024-04-23 DIAGNOSIS — Z08 ENCOUNTER FOR FOLLOW-UP EXAMINATION AFTER COMPLETED TREATMENT FOR MALIGNANT NEOPLASM: ICD-10-CM

## 2024-04-23 DIAGNOSIS — Z12.11 ENCOUNTER FOR COLORECTAL CANCER SCREENING: ICD-10-CM

## 2024-04-23 DIAGNOSIS — E66.09 CLASS 2 OBESITY DUE TO EXCESS CALORIES WITHOUT SERIOUS COMORBIDITY WITH BODY MASS INDEX (BMI) OF 38.0 TO 38.9 IN ADULT: ICD-10-CM

## 2024-04-23 DIAGNOSIS — D75.839 THROMBOCYTOSIS: ICD-10-CM

## 2024-04-23 DIAGNOSIS — I10 PRIMARY HYPERTENSION: ICD-10-CM

## 2024-04-23 DIAGNOSIS — R73.03 PREDIABETES: ICD-10-CM

## 2024-04-23 PROBLEM — F41.9 ANXIETY: Status: RESOLVED | Noted: 2018-08-28 | Resolved: 2024-04-23

## 2024-04-23 PROBLEM — J30.9 ALLERGIC RHINITIS DUE TO ALLERGEN: Status: ACTIVE | Noted: 2024-04-23

## 2024-04-23 PROBLEM — U07.1 COVID: Status: RESOLVED | Noted: 2024-03-20 | Resolved: 2024-04-23

## 2024-04-23 PROBLEM — N93.9 ABNORMAL UTERINE BLEEDING (AUB): Status: RESOLVED | Noted: 2022-10-27 | Resolved: 2024-04-23

## 2024-04-23 PROCEDURE — 99396 PREV VISIT EST AGE 40-64: CPT | Performed by: PHYSICIAN ASSISTANT

## 2024-04-23 PROCEDURE — 99214 OFFICE O/P EST MOD 30 MIN: CPT | Performed by: PHYSICIAN ASSISTANT

## 2024-04-23 PROCEDURE — 3725F SCREEN DEPRESSION PERFORMED: CPT | Performed by: PHYSICIAN ASSISTANT

## 2024-04-23 RX ORDER — ESCITALOPRAM OXALATE 10 MG/1
10 TABLET ORAL DAILY
Qty: 30 TABLET | Refills: 2 | Status: SHIPPED | OUTPATIENT
Start: 2024-04-23 | End: 2024-10-20

## 2024-04-23 RX ORDER — FLUTICASONE PROPIONATE 50 MCG
2 SPRAY, SUSPENSION (ML) NASAL DAILY
Qty: 16 G | Refills: 11 | Status: SHIPPED | OUTPATIENT
Start: 2024-04-23

## 2024-04-23 RX ORDER — AMLODIPINE BESYLATE 5 MG/1
5 TABLET ORAL DAILY
Qty: 30 TABLET | Refills: 2 | Status: SHIPPED | OUTPATIENT
Start: 2024-04-23

## 2024-04-23 NOTE — PATIENT INSTRUCTIONS

## 2024-04-23 NOTE — PROGRESS NOTES
ADULT ANNUAL PHYSICAL  [facility]    NAME: Desirae Coles  AGE: 44 y.o. SEX: female  : 1980     DATE: 2024     Assessment and Plan:     Problem List Items Addressed This Visit          Cardiovascular and Mediastinum    Primary hypertension     BP Readings from Last 3 Encounters:   24 158/74   24 (!) 172/113   23 145/83      Discussed complications of uncontrolled hypertension and treatment options. Agreeable to start amlodipine 5 mg daily. Limit sodium and salt intake. Avoid alcohol and caffeine intake. Will recheck in 2 months, sooner if needed.          Relevant Medications    amLODIPine (NORVASC) 5 mg tablet       Respiratory    Allergic rhinitis due to allergen     Continue cetirizine 10 mg daily and Flonase daily as needed. Controlled.          Relevant Medications    fluticasone (FLONASE) 50 mcg/act nasal spray       Digestive    Hepatic steatosis     LFTs have been elevated for a long time. Recent labs stable. History of hepatic steatosis likely cause. Reviewed nutrition and exercise recommendations. Limit alcohol and Tylenol use.             Hematopoietic and Hemostatic    Thrombocytosis     Lab Results   Component Value Date     2024      History of thrombocytosis. Previous consult with hematology, work up unremarkable. Recent platelets high normal.             Behavioral Health    JEMIMA (generalized anxiety disorder)     Discussed treatment options. She was agreeable to try Lexapro. Start 10 mg once daily. Discussed possible side effects and treatment course. May take 4-6 weeks to reach maximum efficacy. Will recheck in 2 months. Sooner if needed. Declines BH at this time.          Relevant Medications    escitalopram (LEXAPRO) 10 mg tablet       Oncology    Encounter for follow-up examination after completed treatment for malignant neoplasm     Left breast cancer diagnosed in 2020. Pathology  revealed DCIS ER/VT+. She underwent genetic testing which was negative. She had a left breast lumpectomy with Dr. Arcos. She did not need RT and she denied tamoxifen. She is scheduled for a mammogram in August. Last saw surg onc 5/2023.            Other    Pure hypercholesterolemia (Chronic)     Lab Results   Component Value Date    CHOLESTEROL 170 04/20/2024    CHOLESTEROL 158 08/07/2022    CHOLESTEROL 160 09/16/2021     Lab Results   Component Value Date    HDL 71 04/20/2024    HDL 73 08/07/2022    HDL 79 09/16/2021     Lab Results   Component Value Date    TRIG 78 04/20/2024    TRIG 66 08/07/2022    TRIG 54 09/16/2021     Lab Results   Component Value Date    NONHDLC 99 04/20/2024      Lab Results   Component Value Date    LDLCALC 83 04/20/2024      Continue atorvastatin 20 mg daily. Low fat diet.         Class 2 obesity due to excess calories without serious comorbidity with body mass index (BMI) of 38.0 to 38.9 in adult    Annual physical exam - Primary     Discussed general health recommendations and screening guidelines. Up to date on screening labs. Up to date on cervical cancer screening. History of breast cancer. Up to date on follow up. Due for colorectal cancer screening. Agreeable to referral. Declines vaccinations today. Recommend routine dental and eye exams. Next physical one year.             Prediabetes     Lab Results   Component Value Date    HGBA1C 5.7 (H) 04/20/2024      A1c slightly high in prediabetic range. Reviewed nutrition and exercise recommendations.           Other Visit Diagnoses       Encounter for colorectal cancer screening        Relevant Orders    Ambulatory Referral to Gastroenterology              Immunizations and preventive care screenings were discussed with patient today. Appropriate education was printed on patient's after visit summary.    Counseling:  Alcohol/drug use: discussed moderation in alcohol intake, the recommendations for healthy alcohol use, and avoidance of  illicit drug use.  Dental Health: discussed importance of regular tooth brushing, flossing, and dental visits.  Injury prevention: discussed safety/seat belts, safety helmets, smoke detectors, carbon dioxide detectors, and smoking near bedding or upholstery.  Sexual health: discussed sexually transmitted diseases, partner selection, use of condoms, avoidance of unintended pregnancy, and contraceptive alternatives.  Exercise: the importance of regular exercise/physical activity was discussed. Recommend exercise 3-5 times per week for at least 30 minutes.     BMI Counseling: Body mass index is 38.97 kg/m². The BMI is above normal. Nutrition recommendations include decreasing portion sizes, encouraging healthy choices of fruits and vegetables, decreasing fast food intake, consuming healthier snacks, limiting drinks that contain sugar, moderation in carbohydrate intake, increasing intake of lean protein, reducing intake of saturated and trans fat and reducing intake of cholesterol. Exercise recommendations include moderate physical activity 150 minutes/week, exercising 3-5 times per week and strength training exercises. No pharmacotherapy was ordered. Rationale for BMI follow-up plan is due to patient being overweight or obese.         Return in about 3 months (around 7/23/2024) for Recheck anxiety/HTN - 40 mins.     Chief Complaint:     Chief Complaint   Patient presents with    Physical Exam    Anxiety     Worsen with years,       History of Present Illness:     Adult Annual Physical   Patient here for a comprehensive physical exam. The patient reports problems - anxiety . States she has had anxiety for a long time, but it has been worsening. Denies trigger that she is aware of. Started worsening over the last year. States it is daily, all day. Always feels nervous, anxious, worried. Nothing makes it better. Nothing makes it worse. She has not tried anything for it yet. She has not been seen for this before. Denies  depression. Denies SI or HI.  She has been checking her BP daily at home. It has been 140-150s/80-90s every day.     Diet and Physical Activity  Diet/Nutrition: limited junk food and limited fruits/vegetables.   Exercise: no formal exercise.      Depression Screening  PHQ-2/9 Depression Screening    Little interest or pleasure in doing things: 1 - several days  Feeling down, depressed, or hopeless: 1 - several days  PHQ-2 Score: 2  PHQ-2 Interpretation: Negative depression screen       General Health  Sleep: sleeps well and gets 7-8 hours of sleep on average.   Hearing: normal - bilateral.  Vision: no vision problems, most recent eye exam <1 year ago, and wears glasses.   Dental: regular dental visits and brushes teeth twice daily.       /GYN Health  Follows with gynecology? yes   Patient is: perimenopausal  Last menstrual period: 4/10/24  Contraceptive method: barrier methods.    Advanced Care Planning  Do you have an advanced directive? no  Do you have a durable medical power of ? no  ACP document given to the patient? no     Review of Systems:     Review of Systems   Constitutional:  Negative for appetite change, chills, diaphoresis, fatigue, fever and unexpected weight change.   HENT:  Negative for congestion, dental problem, hearing loss, sore throat, tinnitus and trouble swallowing.    Eyes:  Negative for visual disturbance.   Respiratory:  Negative for cough, chest tightness, shortness of breath and wheezing.    Cardiovascular:  Negative for chest pain, palpitations and leg swelling.   Gastrointestinal:  Negative for abdominal pain, blood in stool, constipation, diarrhea, nausea and vomiting.   Endocrine: Negative for cold intolerance, heat intolerance, polydipsia, polyphagia and polyuria.   Genitourinary:  Negative for decreased urine volume, difficulty urinating, dysuria, frequency, hematuria and urgency.   Musculoskeletal:  Negative for arthralgias and myalgias.   Skin:  Negative for rash.    Neurological:  Negative for dizziness, tremors, syncope, weakness, light-headedness, numbness and headaches.   Hematological:  Negative for adenopathy.   Psychiatric/Behavioral:  Negative for dysphoric mood, self-injury, sleep disturbance and suicidal ideas. The patient is nervous/anxious.       Past Medical History:     Past Medical History:   Diagnosis Date    Abnormal Pap smear of cervix     Abnormal uterine bleeding (AUB) 10/27/2022    Case reviewed by surgical committee on 11/23/22  Scheduled D&C hysteroscopy, myomectomy in the setting of AUB and submucosal fibroid  Surgical details reviewed, consent reviewed, signed 12/7/22  Declines pelvic exam in office, discussed and consented for exam under anesthesia during procedure  To OR for scheduled procedure on 1/6/23 with Dr. Valerio  RTC for postoperative visit in 1-2 weeks after pr    Anxiety 01/2015    Last assessed    BRCA gene mutation negative 07/24/2020    [employer] 20 gene panel    Breast cancer (HCC) 08/28/2020    left breast DCIS    COVID 03/20/2024    Fatty liver     HPV (human papilloma virus) infection     unsure      Past Surgical History:     Past Surgical History:   Procedure Laterality Date    BREAST BIOPSY Left 07/20/2020    DCIS    BREAST CYST EXCISION Left 08/2020    DCIS    BREAST LUMPECTOMY Left 08/28/2020    Procedure: BREAST NEEDLE LOCALIZED LUMPECTOMY (NEEDLE LOC AT 0900);  Surgeon: Leland Arcos MD;  Location: [facility];  Service: Surgical Oncology    CHOLECYSTECTOMY      CHOLECYSTECTOMY LAPAROSCOPIC  01/2015    Last assessed    COLPOSCOPY      HYSTEROSCOPY WITH  RESECTION UTERINE TUMOR/FIBROID N/A 01/27/2023    Procedure: HYSTEROSCOPY WITH  RESECTION UTERINE TUMOR/FIBROID (SYMPHION);  Surgeon: Chip Valerio MD;  Location: [facility];  Service: Gynecology    MAMMO NEEDLE LOCALIZATION LEFT (ALL INC) Left 08/28/2020    MAMMO STEREOTACTIC BREAST BIOPSY LEFT (ALL INC) Left 07/20/2020    MOUTH SURGERY      AL HYSTEROSCOPY BX ENDOMETRIUM&/POLYPC  W/WO D&C N/A 2023    Procedure: DILATATION AND CURETTAGE (D&C) WITH HYSTEROSCOPY;  Surgeon: Chip Valerio MD;  Location: [facility];  Service: Gynecology      Social History:     Social History     Socioeconomic History    Marital status: Single     Spouse name: None    Number of children: None    Years of education: None    Highest education level: None   Occupational History    None   Tobacco Use    Smoking status: Former     Current packs/day: 0.00     Average packs/day: 1 pack/day for 25.0 years (25.0 ttl pk-yrs)     Types: Cigarettes     Start date: 1994     Quit date: 2019     Years since quittin.0    Smokeless tobacco: Never   Vaping Use    Vaping status: Former    Substances: Nicotine   Substance and Sexual Activity    Alcohol use: Not Currently     Alcohol/week: 0.0 standard drinks of alcohol    Drug use: Not Currently     Types: Marijuana    Sexual activity: Not Currently     Partners: Male     Birth control/protection: Abstinence, None   Other Topics Concern    None   Social History Narrative    Caffeine use, Does not exercise, Full-time work, One child, Single,      Social Determinants of Health     Financial Resource Strain: Medium Risk (2024)    Overall Financial Resource Strain (CARDIA)     Difficulty of Paying Living Expenses: Somewhat hard   Food Insecurity: No Food Insecurity (2024)    Hunger Vital Sign     Worried About Running Out of Food in the Last Year: Never true     Ran Out of Food in the Last Year: Never true   Transportation Needs: No Transportation Needs (2024)    PRAPARE - Transportation     Lack of Transportation (Medical): No     Lack of Transportation (Non-Medical): No   Physical Activity: Inactive (2022)    Exercise Vital Sign     Days of Exercise per Week: 0 days     Minutes of Exercise per Session: 0 min   Stress: Stress Concern Present (2022)    [place] [employer] - Occupational Stress Questionnaire     Feeling of  Stress : To some extent   Social Connections: Socially Isolated (6/7/2022)    Social Connection and Isolation Panel [NHANES]     Frequency of Communication with Friends and Family: More than three times a week     Frequency of Social Gatherings with Friends and Family: More than three times a week     Attends [employer] Services: Never     Active Member of Clubs or Organizations: No     Attends Club or Organization Meetings: Never     Marital Status: Never    Intimate Partner Violence: Not At Risk (6/7/2022)    Humiliation, Afraid, Rape, and Kick questionnaire     Fear of Current or Ex-Partner: No     Emotionally Abused: No     Physically Abused: No     Sexually Abused: No   Housing Stability: Low Risk  (4/20/2024)    Housing Stability Vital Sign     Unable to Pay for Housing in the Last Year: No     Number of Places Lived in the Last Year: 1     Unstable Housing in the Last Year: No      Family History:     Family History   Problem Relation Age of Onset    Heart attack Father         Acute    Heart failure Father     Coronary artery disease Father     Diabetes Father         DM    Hypertension Father     No Known Problems Maternal Grandmother     Alzheimer's disease Maternal Grandfather     Stroke Paternal Grandmother     Prostate cancer Paternal Grandfather 75    Cancer Paternal Grandfather     No Known Problems Mother     No Known Problems Son     No Known Problems Sister     No Known Problems Half-Sister     No Known Problems Half-Sister     No Known Problems Brother     No Known Problems Maternal Uncle     No Known Problems Maternal Uncle     No Known Problems Maternal Aunt     No Known Problems Maternal Aunt     No Known Problems Maternal Aunt     No Known Problems Paternal Uncle     No Known Problems Paternal Uncle       Current Medications:     Current Outpatient Medications   Medication Sig Dispense Refill    amLODIPine (NORVASC) 5 mg tablet Take 1 tablet (5 mg total) by mouth daily 30 tablet 2     "escitalopram (LEXAPRO) 10 mg tablet Take 1 tablet (10 mg total) by mouth daily 30 tablet 2    fluticasone (FLONASE) 50 mcg/act nasal spray 2 sprays into each nostril daily 16 g 11    milk thistle 175 MG tablet Take 175 mg by mouth daily      atorvastatin (LIPITOR) 20 mg tablet TAKE 1 TABLET BY MOUTH EVERY DAY 90 tablet 3    Biotin 5000 MCG CAPS Take by mouth      cetirizine (ZyrTEC) 10 mg tablet Take 10 mg by mouth daily      DAILY MULTIPLE VITAMINS/IRON TABS Take by mouth      Omega-3 Fatty Acids (fish oil) 1,000 mg Take 1,000 mg by mouth daily      Probiotic Product (ACIDOPHILUS PROBIOTIC BLEND) CAPS Take by mouth       No current facility-administered medications for this visit.      Allergies:     No Known Allergies   Physical Exam:     /74 (BP Location: Right arm, Patient Position: Sitting, Cuff Size: Large)   Pulse 80   Temp 98.6 °F (37 °C) (Temporal)   Ht 5' 7\" (1.702 m)   Wt 113 kg (248 lb 12.8 oz)   LMP 04/10/2024 (Exact Date)   SpO2 100%   BMI 38.97 kg/m²     Physical Exam  Vitals and nursing note reviewed.   Constitutional:       General: She is awake. She is not in acute distress.     Appearance: Normal appearance. She is well-developed and well-groomed. She is obese. She is not ill-appearing.   HENT:      Head: Normocephalic and atraumatic.      Right Ear: Tympanic membrane, ear canal and external ear normal.      Left Ear: Tympanic membrane, ear canal and external ear normal.      Nose: Nose normal. No congestion or rhinorrhea.      Mouth/Throat:      Lips: Pink.      Mouth: Mucous membranes are moist.      Pharynx: Oropharynx is clear. Uvula midline. No oropharyngeal exudate or posterior oropharyngeal erythema.      Tonsils: No tonsillar exudate or tonsillar abscesses.   Eyes:      General: Lids are normal. No scleral icterus.     Extraocular Movements: Extraocular movements intact.      Conjunctiva/sclera: Conjunctivae normal.      Pupils: Pupils are equal, round, and reactive to light. "   Cardiovascular:      Rate and Rhythm: Normal rate and regular rhythm.      Pulses: Normal pulses.           Radial pulses are 2+ on the right side and 2+ on the left side.      Heart sounds: Normal heart sounds. No murmur heard.  Pulmonary:      Effort: Pulmonary effort is normal. No respiratory distress.      Breath sounds: Normal breath sounds and air entry. No decreased air movement. No decreased breath sounds, wheezing, rhonchi or rales.   Abdominal:      General: Abdomen is flat. Bowel sounds are normal. There is no distension.      Palpations: Abdomen is soft. There is no mass.      Tenderness: There is no abdominal tenderness. There is no guarding or rebound.      Hernia: No hernia is present.   Musculoskeletal:         General: Normal range of motion.      Cervical back: Neck supple.      Right lower leg: No edema.      Left lower leg: No edema.   Lymphadenopathy:      Cervical: No cervical adenopathy.   Skin:     General: Skin is warm.      Capillary Refill: Capillary refill takes less than 2 seconds.      Coloration: Skin is not jaundiced.      Findings: No rash.   Neurological:      General: No focal deficit present.      Mental Status: She is alert and oriented to person, place, and time. Mental status is at baseline.      Sensory: Sensation is intact.      Motor: Motor function is intact.      Coordination: Coordination is intact.      Gait: Gait is intact.   Psychiatric:         Attention and Perception: Attention normal.         Mood and Affect: Mood and affect normal.         Speech: Speech normal.         Behavior: Behavior normal. Behavior is cooperative.          Na Sullivan PA-C  [facility]

## 2024-04-29 PROBLEM — K21.9 GASTROESOPHAGEAL REFLUX DISEASE: Status: RESOLVED | Noted: 2020-08-28 | Resolved: 2024-04-29

## 2024-04-30 NOTE — ASSESSMENT & PLAN NOTE
BP Readings from Last 3 Encounters:   04/23/24 158/74   03/19/24 (!) 172/113   08/18/23 145/83      Discussed complications of uncontrolled hypertension and treatment options. Agreeable to start amlodipine 5 mg daily. Limit sodium and salt intake. Avoid alcohol and caffeine intake. Will recheck in 2 months, sooner if needed.

## 2024-04-30 NOTE — ASSESSMENT & PLAN NOTE
Lab Results   Component Value Date     04/20/2024      History of thrombocytosis. Previous consult with hematology, work up unremarkable. Recent platelets high normal.

## 2024-04-30 NOTE — ASSESSMENT & PLAN NOTE
Discussed treatment options. She was agreeable to try Lexapro. Start 10 mg once daily. Discussed possible side effects and treatment course. May take 4-6 weeks to reach maximum efficacy. Will recheck in 2 months. Sooner if needed. Declines BH at this time.

## 2024-04-30 NOTE — ASSESSMENT & PLAN NOTE
Left breast cancer diagnosed in July 2020. Pathology revealed DCIS ER/MI+. She underwent genetic testing which was negative. She had a left breast lumpectomy with Dr. Arcos. She did not need RT and she denied tamoxifen. She is scheduled for a mammogram in August. Last saw surg onc 5/2023.

## 2024-04-30 NOTE — ASSESSMENT & PLAN NOTE
Discussed general health recommendations and screening guidelines. Up to date on screening labs. Up to date on cervical cancer screening. History of breast cancer. Up to date on follow up. Due for colorectal cancer screening. Agreeable to referral. Declines vaccinations today. Recommend routine dental and eye exams. Next physical one year.

## 2024-04-30 NOTE — ASSESSMENT & PLAN NOTE
LFTs have been elevated for a long time. Recent labs stable. History of hepatic steatosis likely cause. Reviewed nutrition and exercise recommendations. Limit alcohol and Tylenol use.

## 2024-04-30 NOTE — ASSESSMENT & PLAN NOTE
Lab Results   Component Value Date    CHOLESTEROL 170 04/20/2024    CHOLESTEROL 158 08/07/2022    CHOLESTEROL 160 09/16/2021     Lab Results   Component Value Date    HDL 71 04/20/2024    HDL 73 08/07/2022    HDL 79 09/16/2021     Lab Results   Component Value Date    TRIG 78 04/20/2024    TRIG 66 08/07/2022    TRIG 54 09/16/2021     Lab Results   Component Value Date    NONHDLC 99 04/20/2024      Lab Results   Component Value Date    LDLCALC 83 04/20/2024      Continue atorvastatin 20 mg daily. Low fat diet.

## 2024-04-30 NOTE — ASSESSMENT & PLAN NOTE
Lab Results   Component Value Date    HGBA1C 5.7 (H) 04/20/2024      A1c slightly high in prediabetic range. Reviewed nutrition and exercise recommendations.

## 2024-06-13 DIAGNOSIS — I10 PRIMARY HYPERTENSION: ICD-10-CM

## 2024-06-13 DIAGNOSIS — J30.89 SEASONAL ALLERGIC RHINITIS DUE TO OTHER ALLERGIC TRIGGER: Primary | ICD-10-CM

## 2024-06-13 DIAGNOSIS — E78.00 PURE HYPERCHOLESTEROLEMIA: Chronic | ICD-10-CM

## 2024-06-13 DIAGNOSIS — F41.1 GAD (GENERALIZED ANXIETY DISORDER): ICD-10-CM

## 2024-06-13 RX ORDER — CETIRIZINE HYDROCHLORIDE 10 MG/1
10 TABLET ORAL DAILY
Qty: 90 TABLET | Refills: 2 | Status: SHIPPED | OUTPATIENT
Start: 2024-06-13

## 2024-06-13 RX ORDER — ESCITALOPRAM OXALATE 10 MG/1
10 TABLET ORAL DAILY
Qty: 30 TABLET | Refills: 2 | Status: SHIPPED | OUTPATIENT
Start: 2024-06-13 | End: 2024-06-14 | Stop reason: SDUPTHER

## 2024-06-13 RX ORDER — ATORVASTATIN CALCIUM 20 MG/1
20 TABLET, FILM COATED ORAL DAILY
Qty: 90 TABLET | Refills: 3 | Status: SHIPPED | OUTPATIENT
Start: 2024-06-13

## 2024-06-13 RX ORDER — AMLODIPINE BESYLATE 5 MG/1
5 TABLET ORAL DAILY
Qty: 30 TABLET | Refills: 2 | Status: SHIPPED | OUTPATIENT
Start: 2024-06-13 | End: 2024-06-14 | Stop reason: SDUPTHER

## 2024-06-13 NOTE — TELEPHONE ENCOUNTER
Contacted patient at . Introduced self and role. Reviewed with patient if she needs scripts sent to [facility]. Patient requesting scripts to be sent to them. Reviewed medications with patient. Patient stating she does not need the Flonase. Patient paying OTC for multivitamins and supplements.     Contacted [facility] directly and spoke with Dacia. Patient has an account and contact number for them is . [facility] is located in [facility].     Scripts sent to pharmacy for fill.

## 2024-06-14 DIAGNOSIS — F41.1 GAD (GENERALIZED ANXIETY DISORDER): ICD-10-CM

## 2024-06-14 DIAGNOSIS — I10 PRIMARY HYPERTENSION: ICD-10-CM

## 2024-06-14 RX ORDER — ESCITALOPRAM OXALATE 10 MG/1
10 TABLET ORAL DAILY
Qty: 90 TABLET | Refills: 1 | Status: SHIPPED | OUTPATIENT
Start: 2024-06-14 | End: 2024-12-11

## 2024-06-14 RX ORDER — AMLODIPINE BESYLATE 5 MG/1
5 TABLET ORAL DAILY
Qty: 90 TABLET | Refills: 1 | Status: SHIPPED | OUTPATIENT
Start: 2024-06-14

## 2024-06-14 NOTE — TELEPHONE ENCOUNTER
Received call from patient. Patient requesting anxiety and blood pressure medications be sent for a 90 day supply to [facility].     Resent Lexapro and Amlodipine script to [facility] for 90 day supply.     Spoke with patient on the phone. Introduced self and role. Patient stated [facility] mailed out all scripts except amlodipine. Patient updated nurse resent script for both amlodipine and lexapro for 90 day supply. Patient stated medications are working well. All questions/concerns answered at this time

## 2024-07-16 ENCOUNTER — RA CDI HCC (OUTPATIENT)
Dept: OTHER | Facility: HOSPITAL | Age: 44
End: 2024-07-16

## 2024-07-23 ENCOUNTER — OFFICE VISIT (OUTPATIENT)
Dept: INTERNAL MEDICINE CLINIC | Facility: CLINIC | Age: 44
End: 2024-07-23

## 2024-07-23 VITALS
TEMPERATURE: 98.7 F | HEART RATE: 82 BPM | SYSTOLIC BLOOD PRESSURE: 138 MMHG | DIASTOLIC BLOOD PRESSURE: 83 MMHG | HEIGHT: 67 IN | BODY MASS INDEX: 39.3 KG/M2 | WEIGHT: 250.38 LBS

## 2024-07-23 DIAGNOSIS — E78.00 PURE HYPERCHOLESTEROLEMIA: Chronic | ICD-10-CM

## 2024-07-23 DIAGNOSIS — F41.1 GAD (GENERALIZED ANXIETY DISORDER): ICD-10-CM

## 2024-07-23 DIAGNOSIS — R73.03 PREDIABETES: ICD-10-CM

## 2024-07-23 DIAGNOSIS — H57.9 ITCHY EYES: ICD-10-CM

## 2024-07-23 DIAGNOSIS — I10 PRIMARY HYPERTENSION: Primary | ICD-10-CM

## 2024-07-23 DIAGNOSIS — E66.09 CLASS 2 OBESITY DUE TO EXCESS CALORIES WITHOUT SERIOUS COMORBIDITY WITH BODY MASS INDEX (BMI) OF 39.0 TO 39.9 IN ADULT: ICD-10-CM

## 2024-07-23 PROCEDURE — 99214 OFFICE O/P EST MOD 30 MIN: CPT | Performed by: PHYSICIAN ASSISTANT

## 2024-07-24 PROBLEM — Z00.00 ANNUAL PHYSICAL EXAM: Status: RESOLVED | Noted: 2022-10-11 | Resolved: 2024-07-24

## 2024-07-24 NOTE — ASSESSMENT & PLAN NOTE
Referral to ophthalmology for further evaluation. Continue saline eye drops as needed. Can try allergy eye drops as well.

## 2024-07-24 NOTE — ASSESSMENT & PLAN NOTE
Anxiety has improved. States she is doing well. Continue lexapro 10 mg once daily. Declines BH at this time. Recheck in 6 months, sooner if needed.

## 2024-07-24 NOTE — PROGRESS NOTES
Ambulatory Visit  Name: Desirae Coles      : 1980      MRN: [id number]  Encounter Provider: Na Sullivan PA-C  Encounter Date: 2024   Encounter department: [facility]    Assessment & Plan   1. Primary hypertension  Assessment & Plan:  BP Readings from Last 3 Encounters:   24 138/83   24 158/74   24 (!) 172/113      Continue amlodipine 5 mg once daily. Limit sodium and salt intake. Avoid alcohol and caffeine intake. Recheck in 6 months, sooner if needed.   2. JEMIMA (generalized anxiety disorder)  Assessment & Plan:  Anxiety has improved. States she is doing well. Continue lexapro 10 mg once daily. Declines BH at this time. Recheck in 6 months, sooner if needed.  3. Pure hypercholesterolemia  Assessment & Plan:  Lab Results   Component Value Date    CHOLESTEROL 170 2024    CHOLESTEROL 158 2022    CHOLESTEROL 160 2021     Lab Results   Component Value Date    HDL 71 2024    HDL 73 2022    HDL 79 2021     Lab Results   Component Value Date    TRIG 78 2024    TRIG 66 2022    TRIG 54 2021     Lab Results   Component Value Date    NONHDLC 99 2024      Lab Results   Component Value Date    LDLCALC 83 2024      Continue atorvastatin 20 mg once daily. Low fat diet.  4. Prediabetes  Assessment & Plan:  Lab Results   Component Value Date    HGBA1C 5.7 (H) 2024      Reviewed nutrition and exercise recommendations.   5. Itchy eyes  Assessment & Plan:  Referral to ophthalmology for further evaluation. Continue saline eye drops as needed. Can try allergy eye drops as well.   Orders:  -     Ambulatory Referral to Ophthalmology; Future  6. Class 2 obesity due to excess calories without serious comorbidity with body mass index (BMI) of 39.0 to 39.9 in adult    BMI Counseling: Body mass index is 39.21 kg/m². The BMI is above normal. Nutrition recommendations include decreasing portion sizes,  encouraging healthy choices of fruits and vegetables, decreasing fast food intake, consuming healthier snacks, limiting drinks that contain sugar, moderation in carbohydrate intake, increasing intake of lean protein, reducing intake of saturated and trans fat and reducing intake of cholesterol. Exercise recommendations include moderate physical activity 150 minutes/week, exercising 3-5 times per week and strength training exercises. No pharmacotherapy was ordered. Rationale for BMI follow-up plan is due to patient being overweight or obese.     Depression Screening and Follow-up Plan: Patient was screened for depression during today's encounter. They screened negative with a PHQ-2 score of 0.      History of Present Illness     Patient is a 44 year old female presenting for a follow up on HTN and anxiety. States she has been adherent with her medications. Denies any medication side effects that she is aware of currently. States Lexapro initially caused loopiness, but has worn off. It has worked really well. She no longer feels anxiety is interfering with her life. States her blood pressure has been controlled as well. She is checking it daily and it is always 120s/70.   States her eyes are always very itchy. This has been going on for a long time. It  is always worse over the summer. Denies pain. Denies visual changes. She feels she has dry eyes. Denies matting and discharge. Denies any other symptoms. States last year the eye doctor gave her steroid drops for 2 weeks that really helped. She does not want to go back there. She is currently using a lubricating drop which does help.         Review of Systems   Constitutional:  Negative for appetite change, chills, diaphoresis, fatigue, fever and unexpected weight change.   Eyes:  Positive for itching. Negative for photophobia, pain, discharge, redness and visual disturbance.   Respiratory:  Negative for cough, chest tightness, shortness of breath and wheezing.     Cardiovascular:  Negative for chest pain, palpitations and leg swelling.   Gastrointestinal:  Negative for abdominal pain, blood in stool, constipation, diarrhea, nausea and vomiting.   Endocrine: Negative for cold intolerance, heat intolerance, polydipsia, polyphagia and polyuria.   Musculoskeletal:  Negative for arthralgias and myalgias.   Skin:  Negative for rash.   Neurological:  Negative for dizziness, tremors, syncope, weakness, light-headedness, numbness and headaches.   Hematological:  Negative for adenopathy.   Psychiatric/Behavioral:  Negative for dysphoric mood, self-injury, sleep disturbance and suicidal ideas. The patient is not nervous/anxious.      Past Medical History   Past Medical History:   Diagnosis Date    Abnormal Pap smear of cervix     Abnormal uterine bleeding (AUB) 10/27/2022    Case reviewed by surgical committee on 11/23/22  Scheduled D&C hysteroscopy, myomectomy in the setting of AUB and submucosal fibroid  Surgical details reviewed, consent reviewed, signed 12/7/22  Declines pelvic exam in office, discussed and consented for exam under anesthesia during procedure  To OR for scheduled procedure on 1/6/23 with Dr. Valerio  RTC for postoperative visit in 1-2 weeks after pr    Anxiety 01/2015    Last assessed    BRCA gene mutation negative 07/24/2020    [employer] 20 gene panel    Breast cancer (HCC) 08/28/2020    left breast DCIS    COVID 03/20/2024    Fatty liver     HPV (human papilloma virus) infection     unsure     Past Surgical History:   Procedure Laterality Date    BREAST BIOPSY Left 07/20/2020    DCIS    BREAST CYST EXCISION Left 08/2020    DCIS    BREAST LUMPECTOMY Left 08/28/2020    Procedure: BREAST NEEDLE LOCALIZED LUMPECTOMY (NEEDLE LOC AT 0900);  Surgeon: Leland Arcos MD;  Location: [facility];  Service: Surgical Oncology    CHOLECYSTECTOMY      CHOLECYSTECTOMY LAPAROSCOPIC  01/2015    Last assessed    COLPOSCOPY      HYSTEROSCOPY WITH  RESECTION UTERINE TUMOR/FIBROID N/A  01/27/2023    Procedure: HYSTEROSCOPY WITH  RESECTION UTERINE TUMOR/FIBROID (SYMPHION);  Surgeon: Chip Valerio MD;  Location: [facility];  Service: Gynecology    MAMMO NEEDLE LOCALIZATION LEFT (ALL INC) Left 08/28/2020    MAMMO STEREOTACTIC BREAST BIOPSY LEFT (ALL INC) Left 07/20/2020    MOUTH SURGERY      OK HYSTEROSCOPY BX ENDOMETRIUM&/POLYPC W/WO D&C N/A 01/27/2023    Procedure: DILATATION AND CURETTAGE (D&C) WITH HYSTEROSCOPY;  Surgeon: Chip Valerio MD;  Location: [facility];  Service: Gynecology     Family History   Problem Relation Age of Onset    Heart attack Father         Acute    Heart failure Father     Coronary artery disease Father     Diabetes Father         DM    Hypertension Father     No Known Problems Maternal Grandmother     Alzheimer's disease Maternal Grandfather     Stroke Paternal Grandmother     Prostate cancer Paternal Grandfather 75    Cancer Paternal Grandfather     No Known Problems Mother     No Known Problems Son     No Known Problems Sister     No Known Problems Half-Sister     No Known Problems Half-Sister     No Known Problems Brother     No Known Problems Maternal Uncle     No Known Problems Maternal Uncle     No Known Problems Maternal Aunt     No Known Problems Maternal Aunt     No Known Problems Maternal Aunt     No Known Problems Paternal Uncle     No Known Problems Paternal Uncle      Current Outpatient Medications on File Prior to Visit   Medication Sig Dispense Refill    amLODIPine (NORVASC) 5 mg tablet Take 1 tablet (5 mg total) by mouth daily 90 tablet 1    atorvastatin (LIPITOR) 20 mg tablet Take 1 tablet (20 mg total) by mouth daily 90 tablet 3    cetirizine (ZyrTEC) 10 mg tablet Take 1 tablet (10 mg total) by mouth daily 90 tablet 2    escitalopram (LEXAPRO) 10 mg tablet Take 1 tablet (10 mg total) by mouth daily 90 tablet 1    Biotin 5000 MCG CAPS Take by mouth      DAILY MULTIPLE VITAMINS/IRON TABS Take by mouth      fluticasone (FLONASE) 50 mcg/act nasal spray 2  sprays into each nostril daily 16 g 11    milk thistle 175 MG tablet Take 175 mg by mouth daily      Omega-3 Fatty Acids (fish oil) 1,000 mg Take 1,000 mg by mouth daily      Probiotic Product (ACIDOPHILUS PROBIOTIC BLEND) CAPS Take by mouth       No current facility-administered medications on file prior to visit.   No Known Allergies   Current Outpatient Medications on File Prior to Visit   Medication Sig Dispense Refill    amLODIPine (NORVASC) 5 mg tablet Take 1 tablet (5 mg total) by mouth daily 90 tablet 1    atorvastatin (LIPITOR) 20 mg tablet Take 1 tablet (20 mg total) by mouth daily 90 tablet 3    cetirizine (ZyrTEC) 10 mg tablet Take 1 tablet (10 mg total) by mouth daily 90 tablet 2    escitalopram (LEXAPRO) 10 mg tablet Take 1 tablet (10 mg total) by mouth daily 90 tablet 1    Biotin 5000 MCG CAPS Take by mouth      DAILY MULTIPLE VITAMINS/IRON TABS Take by mouth      fluticasone (FLONASE) 50 mcg/act nasal spray 2 sprays into each nostril daily 16 g 11    milk thistle 175 MG tablet Take 175 mg by mouth daily      Omega-3 Fatty Acids (fish oil) 1,000 mg Take 1,000 mg by mouth daily      Probiotic Product (ACIDOPHILUS PROBIOTIC BLEND) CAPS Take by mouth       No current facility-administered medications on file prior to visit.      Social History     Tobacco Use    Smoking status: Former     Current packs/day: 0.00     Average packs/day: 1 pack/day for 25.0 years (25.0 ttl pk-yrs)     Types: Cigarettes     Start date: 1994     Quit date: 2019     Years since quittin.2    Smokeless tobacco: Never   Vaping Use    Vaping status: Former    Substances: Nicotine   Substance and Sexual Activity    Alcohol use: Not Currently     Alcohol/week: 0.0 standard drinks of alcohol    Drug use: Not Currently     Types: Marijuana    Sexual activity: Not Currently     Partners: Male     Birth control/protection: Abstinence, None     Objective     /83 (BP Location: Left arm, Patient Position: Sitting, Cuff  "Size: Standard)   Pulse 82   Temp 98.7 °F (37.1 °C) (Temporal)   Ht 5' 7\" (1.702 m)   Wt 114 kg (250 lb 6 oz)   BMI 39.21 kg/m²     Physical Exam  Vitals and nursing note reviewed.   Constitutional:       General: She is awake. She is not in acute distress.     Appearance: Normal appearance. She is well-developed and well-groomed. She is obese. She is not ill-appearing.   HENT:      Head: Normocephalic and atraumatic.      Right Ear: Tympanic membrane, ear canal and external ear normal.      Left Ear: Tympanic membrane, ear canal and external ear normal.      Nose: Nose normal.      Mouth/Throat:      Lips: Pink.      Pharynx: Oropharynx is clear. Uvula midline. No oropharyngeal exudate or posterior oropharyngeal erythema.      Tonsils: No tonsillar exudate or tonsillar abscesses.   Eyes:      General: Lids are normal. No scleral icterus.        Right eye: No foreign body, discharge or hordeolum.         Left eye: No foreign body, discharge or hordeolum.      Extraocular Movements: Extraocular movements intact.      Right eye: Normal extraocular motion and no nystagmus.      Left eye: Normal extraocular motion and no nystagmus.      Conjunctiva/sclera: Conjunctivae normal.      Right eye: Right conjunctiva is not injected. No chemosis, exudate or hemorrhage.     Left eye: Left conjunctiva is not injected. No chemosis, exudate or hemorrhage.     Pupils: Pupils are equal, round, and reactive to light.   Cardiovascular:      Rate and Rhythm: Normal rate and regular rhythm.      Pulses: Normal pulses.           Radial pulses are 2+ on the right side and 2+ on the left side.      Heart sounds: Normal heart sounds. No murmur heard.  Pulmonary:      Effort: Pulmonary effort is normal. No respiratory distress.      Breath sounds: Normal breath sounds and air entry. No decreased air movement. No decreased breath sounds, wheezing, rhonchi or rales.   Abdominal:      General: Abdomen is flat. Bowel sounds are normal. " There is no distension.      Palpations: Abdomen is soft. There is no mass.      Tenderness: There is no abdominal tenderness. There is no guarding or rebound.      Hernia: No hernia is present.   Musculoskeletal:         General: Normal range of motion.      Cervical back: Neck supple.      Right lower leg: No edema.      Left lower leg: No edema.   Lymphadenopathy:      Cervical: No cervical adenopathy.   Skin:     General: Skin is warm.      Capillary Refill: Capillary refill takes less than 2 seconds.      Coloration: Skin is not jaundiced.      Findings: No rash.   Neurological:      General: No focal deficit present.      Mental Status: She is alert and oriented to person, place, and time. Mental status is at baseline.      Sensory: Sensation is intact.      Motor: Motor function is intact.      Coordination: Coordination is intact.      Gait: Gait is intact.   Psychiatric:         Attention and Perception: Attention normal.         Mood and Affect: Mood and affect normal.         Speech: Speech normal.         Behavior: Behavior normal. Behavior is cooperative.       Administrative Statements   I have spent a total time of 30 minutes in caring for this patient on the day of the visit/encounter including Diagnostic results, Prognosis, Risks and benefits of tx options, Instructions for management, Patient and family education, Importance of tx compliance, Risk factor reductions, Impressions, Counseling / Coordination of care, Reviewing / ordering tests, medicine, procedures  , and Obtaining or reviewing history  .

## 2024-07-24 NOTE — ASSESSMENT & PLAN NOTE
BP Readings from Last 3 Encounters:   07/23/24 138/83   04/23/24 158/74   03/19/24 (!) 172/113      Continue amlodipine 5 mg once daily. Limit sodium and salt intake. Avoid alcohol and caffeine intake. Recheck in 6 months, sooner if needed.

## 2024-07-24 NOTE — ASSESSMENT & PLAN NOTE
Lab Results   Component Value Date    CHOLESTEROL 170 04/20/2024    CHOLESTEROL 158 08/07/2022    CHOLESTEROL 160 09/16/2021     Lab Results   Component Value Date    HDL 71 04/20/2024    HDL 73 08/07/2022    HDL 79 09/16/2021     Lab Results   Component Value Date    TRIG 78 04/20/2024    TRIG 66 08/07/2022    TRIG 54 09/16/2021     Lab Results   Component Value Date    NONHDLC 99 04/20/2024      Lab Results   Component Value Date    LDLCALC 83 04/20/2024      Continue atorvastatin 20 mg once daily. Low fat diet.

## 2024-07-24 NOTE — ASSESSMENT & PLAN NOTE
Lab Results   Component Value Date    HGBA1C 5.7 (H) 04/20/2024      Reviewed nutrition and exercise recommendations.

## 2024-08-26 ENCOUNTER — TELEPHONE (OUTPATIENT)
Age: 44
End: 2024-08-26

## 2024-08-26 ENCOUNTER — NEW PATIENT (OUTPATIENT)
Dept: URBAN - METROPOLITAN AREA CLINIC 6 | Facility: CLINIC | Age: 44
End: 2024-08-26

## 2024-08-26 DIAGNOSIS — H16.223: ICD-10-CM

## 2024-08-26 PROCEDURE — 92002 INTRM OPH EXAM NEW PATIENT: CPT

## 2024-08-26 ASSESSMENT — VISUAL ACUITY
OD_CC: 20/25+1
OS_CC: 20/30-1

## 2024-08-26 ASSESSMENT — TONOMETRY
OS_IOP_MMHG: 15
OD_IOP_MMHG: 13

## 2024-08-26 NOTE — TELEPHONE ENCOUNTER
Quintin is calling from [facility] regarding a mammogram that was ordered by Geraldine.  The mammogram was done on 7/31/24.  Quintin is asking for the codes to be updated for this mammogram because Desirae was charged a co pay and Desirae thought that the mammogram was routine and that there would be no co pay.

## 2024-08-27 ENCOUNTER — TELEPHONE (OUTPATIENT)
Dept: SURGICAL ONCOLOGY | Facility: CLINIC | Age: 44
End: 2024-08-27

## 2024-08-27 NOTE — TELEPHONE ENCOUNTER
Spoke to patient regarding her $50 copay that she paid for her last mammo-per patient.  She thought it was her yearly so it should have been covered she thought and she has a different insurance now Blue Cross/Blue Shield not state insurance.    I did mention Geraldine Barrientos-changed a code and another person in conversation stated [employer] does not handle billing/coding.  Business office handles billing and coding. [phone] questions.    Patient is aware different insurances do not always have same coverage/copays.      Patient wants to give this issue a few weeks to pan out and will address it again if she needs to.

## 2024-09-17 ENCOUNTER — TELEPHONE (OUTPATIENT)
Dept: INTERNAL MEDICINE CLINIC | Facility: CLINIC | Age: 44
End: 2024-09-17

## 2024-09-17 NOTE — TELEPHONE ENCOUNTER
Folder Color- purple    Name of Form- [employer] App[employer]    Form to be filled out by- Dana    Form to be Faxed (fax number), Mailed (address), or picked up (by whom)    Patient made aware of 10 business day policy.     0.03

## 2024-09-19 NOTE — TELEPHONE ENCOUNTER
Called patient and advised her we needed results in order to complete form, patient stated she would upload in [place].    Na reviewed lab results and completed form.     Per patients request she would like form emailed to patient [email or web address] email sent, patient notified.    Copy made and placed in scanning folder.

## 2024-11-18 DIAGNOSIS — I10 PRIMARY HYPERTENSION: ICD-10-CM

## 2024-11-18 RX ORDER — AMLODIPINE BESYLATE 5 MG/1
5 TABLET ORAL DAILY
Qty: 90 TABLET | Refills: 3 | Status: SHIPPED | OUTPATIENT
Start: 2024-11-18

## 2024-11-19 DIAGNOSIS — F41.1 GAD (GENERALIZED ANXIETY DISORDER): ICD-10-CM

## 2024-11-20 RX ORDER — ESCITALOPRAM OXALATE 10 MG/1
10 TABLET ORAL DAILY
Qty: 90 TABLET | Refills: 0 | Status: SHIPPED | OUTPATIENT
Start: 2024-11-20 | End: 2025-02-18

## 2025-07-15 ENCOUNTER — TELEPHONE (OUTPATIENT)
Dept: INTERNAL MEDICINE CLINIC | Facility: CLINIC | Age: 45
End: 2025-07-15

## 2025-07-25 ENCOUNTER — TELEPHONE (OUTPATIENT)
Age: 45
End: 2025-07-25

## 2025-07-25 NOTE — TELEPHONE ENCOUNTER
Edie from the breast center called, stating the patient is scheduled for a diagnostic mammo currently, but said per the last note from Geraldine she can go back to regular screening mammograms. She would like to have a correct script faxed to her at [phone]

## 2025-07-28 ENCOUNTER — TELEPHONE (OUTPATIENT)
Dept: SURGICAL ONCOLOGY | Facility: CLINIC | Age: 45
End: 2025-07-28

## 2025-07-28 DIAGNOSIS — Z12.31 VISIT FOR SCREENING MAMMOGRAM: Primary | ICD-10-CM

## (undated) DEVICE — SYRINGE 1ML TB 26G X 3/8 SAFETY

## (undated) DEVICE — 3000CC GUARDIAN II: Brand: GUARDIAN

## (undated) DEVICE — GLOVE INDICATOR PI UNDERGLOVE SZ 7.5 BLUE

## (undated) DEVICE — GLOVE PI ULTRA TOUCH SZ.7.0

## (undated) DEVICE — NEEDLE HYPO 22G X 1-1/2 IN

## (undated) DEVICE — CHLORAPREP HI-LITE 26ML ORANGE

## (undated) DEVICE — SMOKE EVACUATION TUBING WITH 8 IN INTEGRAL WAND AND SPONGE GUARD: Brand: BUFFALO FILTER

## (undated) DEVICE — BETHLEHEM UNIVERSAL MINOR GEN: Brand: CARDINAL HEALTH

## (undated) DEVICE — SUT MONOCRYL 4-0 PS-2 18 IN Y496G

## (undated) DEVICE — PENCIL ELECTROSURG E-Z CLEAN -0035H

## (undated) DEVICE — SUT VICRYL 2-0 REEL 54 IN J286G

## (undated) DEVICE — GLOVE SRG BIOGEL 7

## (undated) DEVICE — TUBING SUCTION 5MM X 12 FT

## (undated) DEVICE — BETHLEHEM UNIVERSAL LAPAROTOMY: Brand: CARDINAL HEALTH

## (undated) DEVICE — SYRINGE 20ML LL

## (undated) DEVICE — VIAL DECANTER

## (undated) DEVICE — TIBURON TRANSVERSE LAPAROTOMY SHEET: Brand: CONVERTORS

## (undated) DEVICE — NEEDLE 25G X 1 1/2

## (undated) DEVICE — DRAPE PROBE NEO-PROBE/ULTRASOUND

## (undated) DEVICE — UNDER BUTTOCKS DRAPE W/FLUID CONTROL POUCH: Brand: CONVERTORS

## (undated) DEVICE — SPONGE STICK WITH PVP-I: Brand: KENDALL

## (undated) DEVICE — INTENDED FOR TISSUE SEPARATION, AND OTHER PROCEDURES THAT REQUIRE A SHARP SURGICAL BLADE TO PUNCTURE OR CUT.: Brand: BARD-PARKER SAFETY BLADES SIZE 15, STERILE

## (undated) DEVICE — DRAPE EQUIPMENT RF WAND

## (undated) DEVICE — Device

## (undated) DEVICE — SUT VICRYL 3-0 SH 27 IN J416H

## (undated) DEVICE — PAD GROUNDING ADULT

## (undated) DEVICE — BETHLEHEM UNIVERSAL MINOR VAG: Brand: CARDINAL HEALTH

## (undated) DEVICE — LIGHT HANDLE COVER SLEEVE DISP BLUE STELLAR

## (undated) DEVICE — 3M™ STERI-STRIP™ REINFORCED ADHESIVE SKIN CLOSURES, R1547, 1/2 IN X 4 IN (12 MM X 100 MM), 6 STRIPS/ENVELOPE: Brand: 3M™ STERI-STRIP™

## (undated) DEVICE — TISSUE REMOVAL SYSTEM FLUID MANAGEMENT ACCESSORIES: Brand: SYMPHION

## (undated) DEVICE — MEDI-VAC YANK SUCT HNDL W/TPRD BULBOUS TIP: Brand: CARDINAL HEALTH

## (undated) DEVICE — TRAY FOLEY 16FR URIMETER SURESTEP

## (undated) DEVICE — CYSTO TUBING SINGLE IRRIGATION

## (undated) DEVICE — VIOLET BRAIDED (POLYGLACTIN 910), SYNTHETIC ABSORBABLE SUTURE: Brand: COATED VICRYL

## (undated) DEVICE — SCD SEQUENTIAL COMPRESSION COMFORT SLEEVE MEDIUM KNEE LENGTH: Brand: KENDALL SCD

## (undated) DEVICE — SUT VICRYL 0 CT-1 27 IN J260H

## (undated) RX ORDER — CYCLOSPORINE 0.5 MG/ML
1 EMULSION OPHTHALMIC TWICE A DAY
Start: 2024-08-26